# Patient Record
Sex: MALE | Employment: FULL TIME | URBAN - METROPOLITAN AREA
[De-identification: names, ages, dates, MRNs, and addresses within clinical notes are randomized per-mention and may not be internally consistent; named-entity substitution may affect disease eponyms.]

---

## 2017-02-28 ENCOUNTER — TRANSFERRED RECORDS (OUTPATIENT)
Dept: HEALTH INFORMATION MANAGEMENT | Facility: CLINIC | Age: 42
End: 2017-02-28

## 2017-05-02 ENCOUNTER — PRE VISIT (OUTPATIENT)
Dept: ORTHOPEDICS | Facility: CLINIC | Age: 42
End: 2017-05-02

## 2017-05-02 NOTE — TELEPHONE ENCOUNTER
1.  Date/reason for appt: 5/4/17 8:30AM - Rt Humerus Lesion  2.  Referring provider: Dr. Georgi Hammond  3.  Call to patient (Yes / No - short description): no, pt is referred  4.  Previous care at / records requested from: [facility] -- Faxed request for records/imaging

## 2017-05-04 ENCOUNTER — OFFICE VISIT (OUTPATIENT)
Dept: ORTHOPEDICS | Facility: CLINIC | Age: 42
End: 2017-05-04

## 2017-05-04 VITALS — WEIGHT: 173.4 LBS | HEIGHT: 72 IN | BODY MASS INDEX: 23.49 KG/M2

## 2017-05-04 DIAGNOSIS — M89.9 BONE LESION: ICD-10-CM

## 2017-05-04 DIAGNOSIS — M89.9 BONE LESION: Primary | ICD-10-CM

## 2017-05-04 PROBLEM — J30.9 ATOPIC RHINITIS: Status: ACTIVE | Noted: 2017-05-04

## 2017-05-04 LAB
ALP SERPL-CCNC: 63 U/L (ref 40–150)
BUN SERPL-MCNC: 16 MG/DL (ref 7–30)
CALCIUM SERPL-MCNC: 9.5 MG/DL (ref 8.5–10.1)
CREAT SERPL-MCNC: 0.96 MG/DL (ref 0.66–1.25)
ERYTHROCYTE [DISTWIDTH] IN BLOOD BY AUTOMATED COUNT: 13.3 % (ref 10–15)
ERYTHROCYTE [SEDIMENTATION RATE] IN BLOOD BY WESTERGREN METHOD: 7 MM/H (ref 0–15)
GFR SERPL CREATININE-BSD FRML MDRD: 86 ML/MIN/1.7M2
HCT VFR BLD AUTO: 49.6 % (ref 40–53)
HGB BLD-MCNC: 16 G/DL (ref 13.3–17.7)
KAPPA LC UR-MCNC: 5.13 MG/DL (ref 0.33–1.94)
KAPPA LC/LAMBDA SER: 2.25 {RATIO} (ref 0.26–1.65)
LAMBDA LC SERPL-MCNC: 2.28 MG/DL (ref 0.57–2.63)
LDH SERPL L TO P-CCNC: 132 U/L (ref 85–227)
MCH RBC QN AUTO: 29.1 PG (ref 26.5–33)
MCHC RBC AUTO-ENTMCNC: 32.3 G/DL (ref 31.5–36.5)
MCV RBC AUTO: 90 FL (ref 78–100)
PLATELET # BLD AUTO: 244 10E9/L (ref 150–450)
PSA SERPL-ACNC: 0.86 UG/L (ref 0–4)
RBC # BLD AUTO: 5.5 10E12/L (ref 4.4–5.9)
WBC # BLD AUTO: 4.8 10E9/L (ref 4–11)

## 2017-05-04 ASSESSMENT — ENCOUNTER SYMPTOMS
EYE IRRITATION: 0
RECTAL PAIN: 0
JAUNDICE: 0
VOMITING: 0
LIGHT-HEADEDNESS: 1
ARTHRALGIAS: 1
TACHYCARDIA: 0
ORTHOPNEA: 0
BLOATING: 0
FATIGUE: 1
HYPOTENSION: 0
INCREASED ENERGY: 1
ABDOMINAL PAIN: 0
NAUSEA: 0
LEG PAIN: 0
SKIN CHANGES: 0
WEIGHT GAIN: 0
POLYDIPSIA: 0
POLYPHAGIA: 0
MYALGIAS: 1
ALTERED TEMPERATURE REGULATION: 0
CLAUDICATION: 0
SYNCOPE: 0
PALPITATIONS: 1
POOR WOUND HEALING: 0
HALLUCINATIONS: 0
RECTAL BLEEDING: 0
EXERCISE INTOLERANCE: 0
MUSCLE CRAMPS: 0
BOWEL INCONTINENCE: 0
BACK PAIN: 0
EYE WATERING: 0
HEARTBURN: 1
BLOOD IN STOOL: 0
EYE REDNESS: 0
SLEEP DISTURBANCES DUE TO BREATHING: 0
NAIL CHANGES: 1
STIFFNESS: 1
CHILLS: 0
CONSTIPATION: 0
MUSCLE WEAKNESS: 1
JOINT SWELLING: 0
EYE PAIN: 0
FEVER: 0
WEIGHT LOSS: 0
LEG SWELLING: 0
DOUBLE VISION: 0
DECREASED APPETITE: 0
DIARRHEA: 1
NIGHT SWEATS: 0
HYPERTENSION: 0
NECK PAIN: 1

## 2017-05-04 NOTE — PROGRESS NOTES
The results were reviewed.  Please notify the patient of any abnormal results.      John Hudson MD  5/4/2017  6:12 PM

## 2017-05-04 NOTE — TELEPHONE ENCOUNTER
Spoke to medical records, they will have the records faxed to us within the hour. The request was still processing.

## 2017-05-04 NOTE — PROGRESS NOTES
"    [facility], Orthopaedic Surgery Consultation  by John Hudson M.D.    Vince Krueger MRN# [id number]   Age: 41 year old YOB: 1975     Requesting physician: Georgi Hammond MD [facility]  Dr. Marlon Pulliam, [facility]  Dr. Radames Alvares [facility]  Tanya Wen NP primary care, [place], [place], [facility]            Assessment and Plan:   Assessment:  1. Osteolytic lesion R humeral diaphysis.  Likely neoplastic in origin. R/o lymphoma, myeloma, MetCA or other aggressive benign neoplasms.  2. High risk of path fracture (Mirel's score 10 of 12)  3. Humeral fracture brace orthosis and sling while at work.    Plan:  1. Metastatic screening labs  2. SPEP  3. Whole body bone scan  4. CT of chest abd pelvis  5. MRI of humerus  6. Ultimately may require open biopsy, intralesional curettage depending upon dx, and prophylactic internal fixation.  7. RTC in 1 week to review above testing results          History of Present Illness:   41 year old male  chief complaint    R hand dominant male.   Otherwise healthy.  Initially saw Kimber Alvares and Kang who Rx'd PT and brace and injection for clinical dx of \"tennis elbow\".  Eventually saw Chiropracter Steven Collins who obtained XR and lesion noted on XR R humerus.  No personal hx of CA.  No night sweats or chills.    Current symptoms:  Problem: Progressive Pain and weakness in R arm, trouble turning arm, twisting problematic, can lift and flex elbow.  Cannot turn steering wheel easily.  Onset and duration: x 1.5 years  Awakens from sleep due to sx's:  No  Precipitating Injury:  No    Other joints or sites painful:  No  Fever: No  Appetite change or weight loss: No           Physical Exam:     EXAMINATION pertinent findings:   VITAL SIGNS: Height 1.829 m (6'), weight 78.7 kg (173 lb 6.4 oz).  Body mass index is 23.52 kg/(m^2).  RESP: non labored breathing   ABD: benign . No organomegaly.  SKIN: grossly " normal   LYMPHATIC: grossly normal . Supraclavicular or cervical or axillary adenopathy.  NEURO: grossly normal . Marked weakness R shoulder. Strength not tested due to fx risk.  VASCULAR: satisfactory perfusion of all extremities   MUSCULOSKELETAL:   Tender R humerus.  Full AROM R shoulder            Data:   All laboratory data reviewed  All imaging studies reviewed by me         MD Rai Eduardo Family Professor  Oncology and Adult Reconstructive Surgery  Dept Orthopaedic Surgery, [facility]  [phone] office, [phone] pager  [email or web address]            DATA for DOCUMENTATION:         Past Medical History:     Patient Active Problem List   Diagnosis     Gastroesophageal reflux disease     Atopic rhinitis     Contact dermatitis     Mass of neck     History of fundoplication     No past medical history on file.    Also see scanned health assessment forms.       Past Surgical History:   No past surgical history on file.         Social History:     Social History     Social History     Marital status:      Spouse name: N/A     Number of children: N/A     Years of education: N/A     Occupational History     Not on file.     Social History Main Topics     Smoking status: Not on file     Smokeless tobacco: Not on file     Alcohol use Not on file     Drug use: Not on file     Sexual activity: Not on file     Other Topics Concern     Not on file     Social History Narrative            Family History:     No family history on file.         Medications:     No current outpatient prescriptions on file.     No current facility-administered medications for this visit.               Review of Systems:   A comprehensive 10 point review of systems (constitutional, ENT, cardiac, peripheral vascular, lymphatic, respiratory, GI, , Musculoskeletal, skin, Neurological) was performed and found to be negative except as described in this note.     See intake form completed by patient      Answers for  HPI/ROS submitted by the patient on 5/4/2017   General Symptoms: Yes  Skin Symptoms: Yes  HENT Symptoms: No  EYE SYMPTOMS: Yes  HEART SYMPTOMS: Yes  LUNG SYMPTOMS: No  INTESTINAL SYMPTOMS: Yes  URINARY SYMPTOMS: No  REPRODUCTIVE SYMPTOMS: No  SKELETAL SYMPTOMS: Yes  BLOOD SYMPTOMS: No  NERVOUS SYSTEM SYMPTOMS: No  MENTAL HEALTH SYMPTOMS: No  Fever: No  Loss of appetite: No  Weight loss: No  Weight gain: No  Fatigue: Yes  Night sweats: No  Chills: No  Increased stress: No  Excessive hunger: No  Excessive thirst: No  Feeling hot or cold when others believe the temperature is normal: No  Loss of height: No  Post-operative complications: No  Surgical site pain: No  Hallucinations: No  Change in or Loss of Energy: Yes  Hyperactivity: No  Confusion: No  Changes in hair: No  Changes in moles/birth marks: No  Itching: No  Rashes: Yes  Changes in nails: Yes  Acne: Yes  Change in facial hair: No  Warts: No  Non-healing sores: No  Scarring: No  Flaking of skin: No  Color changes of hands/feet in cold : No  Sun sensitivity: No  Skin thickening: No  Eye pain: No  Vision loss: No  Dry eyes: No  Watery eyes: No  Eye bulging: No  Double vision: No  Flashing of lights: No  Spots: No  Floaters: No  Redness: No  Crossed eyes: No  Tunnel Vision: No  Yellowing of eyes: No  Eye irritation: No  Chest pain or pressure: Yes  Fast or irregular heartbeat: Yes  Pain in legs with walking: No  Swelling in feet or ankles: No  Trouble breathing while lying down: No  Fingers or Toes appear blue: No  High blood pressure: No  Low blood pressure: No  Fainting: No  Murmurs: No  Chest pain on exertion: No  Chest pain at rest: No  Cramping pain in leg during exercise: No  Pacemaker: No  Varicose veins: No  Edema or swelling: No  Fast heart beat: No  Wake up at night with shortness of breath: No  Heart flutters: Yes  Light-headedness: Yes  Exercise intolerance: No  Heart burn or indigestion: Yes  Nausea: No  Vomiting: No  Abdominal pain: No  Bloating:  No  Constipation: No  Diarrhea: Yes  Blood in stool: No  Black stools: No  Rectal or Anal pain: No  Fecal incontinence: No  Rectal bleeding: No  Yellowing of skin or eyes: No  Vomit with blood: No  Change in stools: Yes  Hemorrhoids: No  Back pain: No  Muscle aches: Yes  Neck pain: Yes  Swollen joints: No  Joint pain: Yes  Bone pain: Yes  Muscle cramps: No  Muscle weakness: Yes  Joint stiffness: Yes  Bone fracture: No

## 2017-05-04 NOTE — NURSING NOTE
Reason For Visit:   Chief Complaint   Patient presents with     Consult     Right humerus lesion. Referred by Georgi Hammond at [facility]            Ht 1.829 m (6')  Wt 78.7 kg (173 lb 6.4 oz)  BMI 23.52 kg/m2                 Pain Assessment  Patient Currently in Pain: Yes  0-10 Pain Scale: 2  Primary Pain Location: Arm  Pain Orientation: Right  Pain Descriptors: Aching, Dull, Sharp, Stabbing, Constant  Alleviating Factors: Rest  Aggravating Factors: Movement              No current outpatient prescriptions on file.     No current facility-administered medications for this visit.        Allergies   Allergen Reactions     Penicillins Terrance Galvan LPN

## 2017-05-04 NOTE — TELEPHONE ENCOUNTER
LVM for medical records at [facility] to get update on records.     Faxed another STAT request for records.

## 2017-05-04 NOTE — MR AVS SNAPSHOT
After Visit Summary   5/4/2017    Vince Krueger    MRN: [id number]           Patient Information     Date Of Birth          1975        Visit Information        Provider Department      5/4/2017 8:30 AM John Hudson MD [facility]        Today's Diagnoses     Bone lesion    -  1       Follow-ups after your visit        Your next 10 appointments already scheduled     May 04, 2017 10:00 AM CDT   LAB with  LAB   [facility] ([facility])    [address] [place] [id number] [phone]           Patient must bring picture ID.  Patient should be prepared to give a urine specimen  Please do not eat 10-12 hours before your appointment if you are coming in fasting for labs on lipids, cholesterol, or glucose (sugar).  Pregnant women should follow their Care Team instructions. Water with medications is okay. Do not drink coffee or other fluids.   If you have concerns about taking  your medications, please ask at office or if scheduling via TabbedOut, send a message by clicking on Secure Messaging, Message Your Care Team.            May 04, 2017 12:00 PM CDT   (Arrive by 11:45 AM)   CT CHEST/ABDOMEN/PELVIS W CONTRAST with UCCT1   [facility] CT ([facility])    [address] [place] [id number] [phone]           Please bring any scans or X-rays taken at other hospitals, if similar tests were done. Also bring a list of your medicines, including vitamins, minerals and over-the-counter drugs. It is safest to leave personal items at home.  Be sure to tell your doctor:   If you have any allergies.   If there s any chance you are pregnant.   If you are breastfeeding.   If you have any special needs.  You may have contrast for this exam. To prepare:   Do not eat or drink for 2 hours before your exam. If you need to take medicine, you may take it with small sips of water. (We  may ask you to take liquid medicine as well.)   The day before your exam, drink extra fluids at least six 8-ounce glasses (unless your doctor tells you to restrict your fluids).  Patients over 70 or patients with diabetes or kidney problems:   If you haven t had a blood test (creatinine test) within the last 30 days, go to your clinic or Diagnostic Imaging Department for this test.  If you have diabetes:   If your kidney function is normal, continue taking your metformin (Avandamet, Glucophage, Glucovance, Metaglip) on the day of your exam.   If your kidney function is abnormal, wait 48 hours before restarting this medicine.  You will have oral contrast for this exam:   You will drink the contrast at home. Get this from your clinic or Diagnostic Imaging Department. Please follow the directions given.  Please wear loose clothing, such as a sweat suit or jogging clothes. Avoid snaps, zippers and other metal. We may ask you to undress and put on a hospital gown.  If you have any questions, please call the Imaging Department where you will have your exam.            May 09, 2017  8:30 AM CDT   NM INJECTION with UUNMINJ1   [facility], Nuclear Medicine ([facility])    [address] [facility] [id number] [phone]            May 09, 2017  9:00 AM CDT   MR UPPER EXTREMITY NON JOINT RIGHT W/O CONTRAST with UUMR2   [facility], MRI ([facility])    [address] [facility] [id number] [phone]           Take your medicines as usual, unless your doctor tells you not to. Bring a list of your current medicines to your exam (including vitamins, minerals and over-the-counter drugs). Also bring the results of similar scans you may have had.  Please remove any body piercings and hair extensions before you arrive.  Follow your doctor s orders. If you do not, we may have to postpone your exam.  You  will not have contrast for this exam. You do not need to do anything special to prepare.  The MRI machine uses a strong magnet. Please wear clothes without metal (snaps, zippers). A sweatsuit works well, or we may give you a hospital gown.   **IMPORTANT** THE INSTRUCTIONS BELOW ARE ONLY FOR THOSE PATIENTS WHO HAVE BEEN TOLD THEY WILL RECEIVE SEDATION OR GENERAL ANESTHESIA DURING THEIR MRI PROCEDURE:  IF YOU WILL RECEIVE SEDATION (take medicine to help you relax during your exam):   You must get the medicine from your doctor before you arrive. Bring the medicine to the exam. Do not take it at home.   Arrive one hour early. Bring someone who can take you home after the test. Your medicine will make you sleepy. After the exam, you may not drive, take a bus or take a taxi by yourself.   No eating 8 hours before your exam. You may have clear liquids up until 4 hours before your exam. (Clear liquids include water, clear tea, black coffee and fruit juice without pulp.)  IF YOU WILL RECEIVE ANESTHESIA (be asleep for your exam):   Arrive 1 1/2 hours early. Bring someone who can take you home after the test. You may not drive, take a bus or take a taxi by yourself.   No eating 8 hours before your exam. You may have clear liquids up until 4 hours before your exam. (Clear liquids include water, clear tea, black coffee and fruit juice without pulp.)   You will spend four to five hours in the recovery room.  Please call the Imaging Department at your exam site with any questions.            May 09, 2017 11:30 AM CDT   NM BONE SCAN WHOLE BODY with UUNM3   [facility] [place], Nuclear Medicine ([facility])    [address] [facility] [id number] [phone]           Please bring a list of your medicines to the exam. (Include vitamins, minerals and over-the-counter drugs.) You should wear comfortable clothes. Leave your valuables at home. Please bring related prior results and  films. Tell your doctor:   If you are breastfeeding or may be pregnant.   If you have had a barium test within the past few days. Barium may change the results of certain exams.   If you think you may need sedation (medicine to help you relax).  You may eat and drink as normal.  Drink plenty of fluids and empty bladder frequently between injection and scans.            May 11, 2017 11:15 AM CDT   (Arrive by 11:00 AM)   RETURN TUMOR VISIT with John Hudson MD   [facility] ([facility])    [address] [place] [address]  [address] [facility] [id number] [phone]              Future tests that were ordered for you today     Open Future Orders        Priority Expected Expires Ordered    CT Chest/Abdomen/Pelvis w Contrast Routine  5/4/2018 5/4/2017    NM Bone whole body Routine  5/4/2018 5/4/2017    MRI Upper extremity non joint without contast RT* Routine  5/4/2018 5/4/2017    Alkaline phosphatase Routine  6/3/2017 5/4/2017    Calcium Routine  6/3/2017 5/4/2017    Creatinine Routine  6/3/2017 5/4/2017    Lactate Dehydrogenase Routine  6/3/2017 5/4/2017    Protein electrophoresis Routine  6/3/2017 5/4/2017    Prostate spec antigen screen Routine  6/3/2017 5/4/2017    Urea nitrogen Routine  6/3/2017 5/4/2017    CBC with platelets Routine  6/3/2017 5/4/2017    Erythrocyte sedimentation rate auto Routine  6/3/2017 5/4/2017    [place] and lambda light chain Routine  6/3/2017 5/4/2017            Who to contact     Please call your clinic at [phone] to:    Ask questions about your health    Make or cancel appointments    Discuss your medicines    Learn about your test results    Speak to your doctor   If you have compliments or concerns about an experience at your clinic, or if you wish to file a complaint, please contact [facility] Patient Relations at [phone] or email us at [email or web address]         Additional Information About Your  Visit        [employer] Information     [employer] is an electronic gateway that provides easy, online access to your medical records. With [employer], you can request a clinic appointment, read your test results, renew a prescription or communicate with your care team.     To sign up for [employer] visit the website at [email or web address]   You will be asked to enter the access code listed below, as well as some personal information. Please follow the directions to create your username and password.     Your access code is: [email or web address]  Expires: 2017  6:30 AM     Your access code will  in 90 days. If you need help or a new code, please contact your [facility] or call [phone] for assistance.        Care EveryWhere ID     This is your Care EveryWhere ID. This could be used by other organizations to access your [place] medical records  [id number]        Your Vitals Were     Height BMI (Body Mass Index)                1.829 m (6') 23.52 kg/m2           Blood Pressure from Last 3 Encounters:   No data found for BP    Weight from Last 3 Encounters:   17 78.7 kg (173 lb 6.4 oz)               Primary Care Provider Office Phone # Fax #    Tanya Wen [phone]       [facility] [address] [place] [address]        Thank you!     Thank you for choosing [facility]  for your care. Our goal is always to provide you with excellent care. Hearing back from our patients is one way we can continue to improve our services. Please take a few minutes to complete the written survey that you may receive in the mail after your visit with us. Thank you!             Your Updated Medication List - Protect others around you: Learn how to safely use, store and throw away your medicines at www.disposemymeds.org.      Notice  As of 2017  9:56 AM    You have not been prescribed any medications.

## 2017-05-05 LAB
ALBUMIN SERPL ELPH-MCNC: 4.7 G/DL (ref 3.7–5.1)
ALPHA1 GLOB SERPL ELPH-MCNC: 0.3 G/DL (ref 0.2–0.4)
ALPHA2 GLOB SERPL ELPH-MCNC: 0.6 G/DL (ref 0.5–0.9)
B-GLOBULIN SERPL ELPH-MCNC: 0.8 G/DL (ref 0.6–1)
GAMMA GLOB SERPL ELPH-MCNC: 2.4 G/DL (ref 0.7–1.6)
M PROTEIN SERPL ELPH-MCNC: 1.6 G/DL
PROT PATTERN SERPL ELPH-IMP: ABNORMAL

## 2017-05-09 ENCOUNTER — HOSPITAL ENCOUNTER (OUTPATIENT)
Dept: MRI IMAGING | Facility: CLINIC | Age: 42
Discharge: HOME | End: 2017-05-09
Attending: ORTHOPAEDIC SURGERY | Admitting: ORTHOPAEDIC SURGERY
Payer: COMMERCIAL

## 2017-05-09 ENCOUNTER — HOSPITAL ENCOUNTER (OUTPATIENT)
Dept: NUCLEAR MEDICINE | Facility: CLINIC | Age: 42
Setting detail: NUCLEAR MEDICINE
End: 2017-05-09
Attending: ORTHOPAEDIC SURGERY
Payer: COMMERCIAL

## 2017-05-09 DIAGNOSIS — M89.9 BONE LESION: ICD-10-CM

## 2017-05-09 PROCEDURE — 34300033 ZZH RX 343: Performed by: ORTHOPAEDIC SURGERY

## 2017-05-09 PROCEDURE — 78306 BONE IMAGING WHOLE BODY: CPT

## 2017-05-09 PROCEDURE — A9503 TC99M MEDRONATE: HCPCS | Performed by: ORTHOPAEDIC SURGERY

## 2017-05-09 RX ORDER — GADOBUTROL 604.72 MG/ML
7.5 INJECTION INTRAVENOUS ONCE
Status: COMPLETED | OUTPATIENT
Start: 2017-05-09 | End: 2017-05-09

## 2017-05-09 RX ORDER — TC 99M MEDRONATE 20 MG/10ML
20-30 INJECTION, POWDER, LYOPHILIZED, FOR SOLUTION INTRAVENOUS ONCE
Status: COMPLETED | OUTPATIENT
Start: 2017-05-09 | End: 2017-05-09

## 2017-05-09 RX ADMIN — GADOBUTROL 7.5 ML: 604.72 INJECTION INTRAVENOUS at 10:24

## 2017-05-09 RX ADMIN — TC 99M MEDRONATE 22.4 MCI.: 20 INJECTION, POWDER, LYOPHILIZED, FOR SOLUTION INTRAVENOUS at 08:45

## 2017-05-11 ENCOUNTER — OFFICE VISIT (OUTPATIENT)
Dept: SURGERY | Facility: CLINIC | Age: 42
End: 2017-05-11

## 2017-05-11 ENCOUNTER — ANESTHESIA EVENT (OUTPATIENT)
Dept: SURGERY | Facility: CLINIC | Age: 42
DRG: 825 | End: 2017-05-11
Payer: COMMERCIAL

## 2017-05-11 ENCOUNTER — ALLIED HEALTH/NURSE VISIT (OUTPATIENT)
Dept: SURGERY | Facility: CLINIC | Age: 42
End: 2017-05-11

## 2017-05-11 ENCOUNTER — OFFICE VISIT (OUTPATIENT)
Dept: ORTHOPEDICS | Facility: CLINIC | Age: 42
End: 2017-05-11

## 2017-05-11 VITALS — WEIGHT: 174 LBS | HEIGHT: 72 IN | BODY MASS INDEX: 23.57 KG/M2

## 2017-05-11 VITALS
DIASTOLIC BLOOD PRESSURE: 83 MMHG | WEIGHT: 174 LBS | HEIGHT: 72 IN | HEART RATE: 59 BPM | BODY MASS INDEX: 23.57 KG/M2 | TEMPERATURE: 98.6 F | OXYGEN SATURATION: 98 % | RESPIRATION RATE: 16 BRPM | SYSTOLIC BLOOD PRESSURE: 126 MMHG

## 2017-05-11 DIAGNOSIS — Z01.818 PRE-OP EXAMINATION: Primary | ICD-10-CM

## 2017-05-11 DIAGNOSIS — M89.9 LESION OF RIGHT HUMERUS: Primary | ICD-10-CM

## 2017-05-11 DIAGNOSIS — M89.9 LESION OF RIGHT HUMERUS: ICD-10-CM

## 2017-05-11 DIAGNOSIS — C90.00 MULTIPLE MYELOMA NOT HAVING ACHIEVED REMISSION (H): ICD-10-CM

## 2017-05-11 LAB
ALBUMIN SERPL-MCNC: 3.9 G/DL (ref 3.4–5)
ALP SERPL-CCNC: 56 U/L (ref 40–150)
ALT SERPL W P-5'-P-CCNC: 21 U/L (ref 0–70)
ANION GAP SERPL CALCULATED.3IONS-SCNC: 6 MMOL/L (ref 3–14)
AST SERPL W P-5'-P-CCNC: 13 U/L (ref 0–45)
BILIRUB SERPL-MCNC: 0.5 MG/DL (ref 0.2–1.3)
BUN SERPL-MCNC: 12 MG/DL (ref 7–30)
CALCIUM SERPL-MCNC: 9.1 MG/DL (ref 8.5–10.1)
CHLORIDE SERPL-SCNC: 103 MMOL/L (ref 94–109)
CO2 SERPL-SCNC: 29 MMOL/L (ref 20–32)
CREAT SERPL-MCNC: 0.88 MG/DL (ref 0.66–1.25)
GFR SERPL CREATININE-BSD FRML MDRD: ABNORMAL ML/MIN/1.7M2
GLUCOSE SERPL-MCNC: 130 MG/DL (ref 70–99)
POTASSIUM SERPL-SCNC: 4.5 MMOL/L (ref 3.4–5.3)
PROT SERPL-MCNC: 8.5 G/DL (ref 6.8–8.8)
SODIUM SERPL-SCNC: 138 MMOL/L (ref 133–144)

## 2017-05-11 RX ORDER — BIOTIN 10 MG
TABLET ORAL DAILY
COMMUNITY

## 2017-05-11 RX ORDER — LORATADINE 10 MG/1
10 TABLET ORAL PRN
COMMUNITY

## 2017-05-11 ASSESSMENT — LIFESTYLE VARIABLES: TOBACCO_USE: 1

## 2017-05-11 NOTE — LETTER
5/11/2017       RE: Vince Krueger  [address] [place] [facility] [place] [address]     Dear Colleague,    Thank you for referring your patient, Vince Krueger, to the [facility] at [facility]. Please see a copy of my visit note below.      Requesting physician: Georgi Hammond MD [facility]  Dr. Marlon Pulliam, [facility]  Dr. Radames Alvares [facility]  Tanya Wen NP primary care, Kanu Collins [place], [facility]     Diagnosis:  1. Osteolytic lesion of Right humeral diaphysis.  Likely neoplastic in origin. R/o lymphoma, myeloma, MetCA or other aggressive benign neoplasms.  2. High risk of path fracture (Mirel's score 10 of 12)    Interval History:  41 year-old male returns to clinic today with the above. He has completed his laboratory testing, MRI, and bone scan as detailed previously. All of these results have been reviewed by Dr. Hudson.     Patient reports no change in symptoms during this interval. No trauma. He has been working with his sling and is tolerating this well. He has been avoiding use of his right arm as much as possible.     Labs:   Recent Labs   Lab Test  05/04/17   1054   HGB  16.0   SED  7   WBC  4.8     Protein Electropheresis on 5/4/2017:  Component Value Flag Ref Range Units Status Collected Lab   Albumin Fraction 4.7  3.7 - 5.1 g/dL Final 05/04/2017 10:54 AM 51   Alpha 1 Fraction 0.3  0.2 - 0.4 g/dL Final 05/04/2017 10:54 AM 51   Alpha 2 Fraction 0.6  0.5 - 0.9 g/dL Final 05/04/2017 10:54 AM 51   Beta Fraction 0.8  0.6 - 1.0 g/dL Final 05/04/2017 10:54 AM 51   Gamma Fraction 2.4 (H) 0.7 - 1.6 g/dL Final 05/04/2017 10:54 AM 51   Monoclonal Peak 1.6 (H) 0.0 g/dL Final 05/04/2017 10:54 AM 51   ELP Interpretation:     Final 05/04/2017 10:54 AM 51   Monoclonal protein (about 1.6 g/dL) seen in the gamma fraction, not previously    characterized in our laboratory. Recommend serum and urine immunofixation for     confirmation and further characterization if not previously performed    elsewhere. Pathologic significance requires clinical correlation.  ALLEN Wang M.D., Ph.D., Pathologist ().      [place] and Lambda Light chain on 5/4/2017:  Component Value Flag Ref Range Units Status Collected Lab   Kappa Free Lt Chain 5.13 (H) 0.33 - 1.94 mg/dL Final 05/04/2017 10:54 AM 51   Lambda Free Lt Chain 2.28  0.57 - 2.63 mg/dL Final 05/04/2017 10:54 AM 51   Kappa Lambda Ratio 2.25 (H) 0.26 - 1.65  Final 05/04/2017 10:54 AM 51     Imaging studies reviewed by Dr. Hudson.       Impression:  1. Osteolytic lesion of Right humeral diaphysis.  2. Multiple myeloma diagnosed by laboratory testing.     Plan:  1. Referral to Dr. Andre Panda in Oncology for evaluation and management. Appointment is set for 5/17/2017 at 5:00pm.  2. Will obtain CMP, Type and Screen, and Beta 2 microglobulin laboratory testing today.  3. Will defer decision to obtain bone marrow biopsy or skeletal survey to Dr. Panda.  4. Continue sling and humeral brace with NWB to RUE until surgery.  5. Plan to proceed with right humerus open biopsy, intralesional curettage, open reduction internal fixation, and supplemental fixation with Zometa impregnated cement when scheduling allows. Risks, benefits, and alternatives discussed. Specific risk of radial nerve injury discussed in detail.   6. Will obtain pre-op history and physical from PAC today.   7. Plan to see next on date of surgery.       John Hudson MD  [facility] [[email or web address]] Family Professor  Oncology and Adult Reconstructive Surgery  Dept Orthopaedic Surgery, [facility]  [phone] office, [phone] pager  [email or web address]    Total Time = 15 min, 50% of which was spent in counseling and coordination of care as documented above.

## 2017-05-11 NOTE — MR AVS SNAPSHOT
After Visit Summary   2017    Vince Krueger    MRN: [id number]           Patient Information     Date Of Birth          1975        Visit Information        Provider Department      2017 2:30 PM Rn, [facility]        Care Instructions    Preparing for Your Surgery      Name:  Vince Krueger   MRN:  [id number]   :  1975   Today's Date:  2017     Arriving for surgery:  Surgery date:  17  Surgery time:  11:30am  Arrival time:  9:00am  Please come to:     [facility] [address]  [address] [place], [place] [address]. [place]  [address]    - parking is available in front of [facility] from 5:15AM to 8:00PM. If you prefer, park your car in the Green Lot.    -Proceed to the 3rd floor, check in at the Adult Surgery Waiting Lounge. [phone]    If an escort is needed stop at the Information Desk in the lobby. Inform the information person that you are here for surgery. An escort to the Adult Surgery Waiting Lounge will be provided.    What can I eat or drink?  -  You may have solid food or milk products until 8 hours prior to your surgery. Do not eat after 11:30 pm  -  You may have water, apple juice or 7up/Sprite until 2 hours prior to your surgery. Do not drink anything after 9:00 am    Which medicines can I take? No Aspirin, vitamins, or supplements for 7 days before surgery. No Naproxen/Aleve for 2 days before surgery. No Ibuprofen for 24 hours before surgery.    -  Please take these medications the day of surgery:  Prilosec, Claritin, Tylenol if needed    How do I prepare myself?  -  Take two showers: one the night before surgery; and one the morning of surgery.         Use Scrubcare or Hibiclens to wash from neck down for each shower.  You may use your own shampoo and conditioner.   -  Do NOT use lotion, powder, deodorant, or hair gel/spray the day of your surgery.  -  Do NOT wear any jewelry.  -Do not  bring your own medications to the hospital.  -  Bring your ID and insurance card.    Questions or Concerns:  If you have questions or concerns, please call the  Preoperative Assessment Center, Monday-Friday 7AM-7PM:  [phone]    AFTER YOUR SURGERY  Breathing exercises   Breathing exercises help you recover faster. Take deep breaths and let the air out slowly. This will:     Help you wake up after surgery.    Help prevent complications like pneumonia.  Preventing complications will help you go home sooner.   We may give you a breathing device (incentive spirometer) to encourage you to breathe deeply.   Nausea and vomiting   You may feel sick to your stomach after surgery; if so, let your nurse know.    Pain control:  After surgery, you may have pain. Our goal is to help you manage your pain. Pain medicine will help you feel comfortable enough to do activities that will help you heal.  These activities may include breathing exercises, walking and physical therapy.   To help your health care team treat your pain we will ask: 1) If you have pain  2) where it is located 3) describe your pain in your words  Methods of pain control include medications given by mouth, vein or by nerve block for some surgeries.  We may give you a pain control pump that will:  1) Deliver the medicine through a tube placed in your vein  2) Control the amount of medicine you receive  3) Allow you to push a button to deliver a dose of pain medicine  Sequential Compression Device (SCD) or Pneumo Boots:  You may need to wear SCD S on your legs or feet. These are wraps connected to a machine that pumps in air and releases it. The repeated pumping helps prevent blood clots from forming.                 Follow-ups after your visit        Your next 10 appointments already scheduled     May 11, 2017  2:30 PM CDT   (Arrive by 2:15 PM)   PAC RN ASSESSMENT with  Pac Rn   [facility] Preoperative Assessment Center ([email or web address])     [address]  4th Floor  [facility] [id number] [phone]            May 11, 2017  3:10 PM CDT   (Arrive by 2:55 PM)   PAC Anesthesia Consult with  Pac Anesthesiologist   [facility] Preoperative Assessment [facility] ([facility])    [address]  4th Floor  [facility] [id number] [phone]            May 17, 2017  5:00 PM CDT   NEW ONC with Andre Panda MD   [facility] Blood and Marrow Transplant ([facility])    [address]  2nd Floor  [facility] [id number] [phone]            May 19, 2017   Procedure with John Hudson MD   [facility], [place], Same Day Surgery (--)    [address] [id number] [phone]              Future tests that were ordered for you today     Open Future Orders        Priority Expected Expires Ordered    ABO/Rh type and screen Routine  7/10/2017 5/11/2017    Beta 2 microglobulin Routine  5/12/2018 5/11/2017    Comp. Metabolic Panel (14) ([facility]) Routine  7/11/2017 5/11/2017            Who to contact     Please call your clinic at [phone] to:    Ask questions about your health    Make or cancel appointments    Discuss your medicines    Learn about your test results    Speak to your doctor   If you have compliments or concerns about an experience at your clinic, or if you wish to file a complaint, please contact [facility] Patient Relations at [phone] or email us at [email or web address]         Additional Information About Your Visit        [employer] Information     [employer] is an electronic gateway that provides easy, online access to your medical records. With [employer], you can request a clinic appointment, read your test results, renew a prescription or communicate with your care team.     To sign up for [employer] visit the website at [email or web address]   You will be asked to enter the access code listed below, as well as  some personal information. Please follow the directions to create your username and password.     Your access code is: [email or web address]  Expires: 2017  6:30 AM     Your access code will  in 90 days. If you need help or a new code, please contact your [facility] or call [phone] for assistance.        Care EveryWhere ID     This is your Care EveryWhere ID. This could be used by other organizations to access your [place] medical records  [id number]         Blood Pressure from Last 3 Encounters:   17 126/83    Weight from Last 3 Encounters:   17 78.9 kg (174 lb)   17 78.9 kg (174 lb)   17 78.7 kg (173 lb 6.4 oz)              Today, you had the following     No orders found for display       Primary Care Provider Office Phone # Fax #    Tanya MIGUEL ÁNGEL Wen [phone]       [facility] [address] [place] [address]        Thank you!     Thank you for choosing [facility] PREOPERATIVE ASSESSMENT [place]  for your care. Our goal is always to provide you with excellent care. Hearing back from our patients is one way we can continue to improve our services. Please take a few minutes to complete the written survey that you may receive in the mail after your visit with us. Thank you!             Your Updated Medication List - Protect others around you: Learn how to safely use, store and throw away your medicines at www.disposemymeds.org.          This list is accurate as of: 17  2:05 PM.  Always use your most recent med list.                   Brand Name Dispense Instructions for use    loratadine 10 MG tablet    CLARITIN     Take 10 mg by mouth as needed       MULTIVITAMIN ADULT Chew      Take by mouth daily

## 2017-05-11 NOTE — PATIENT INSTRUCTIONS
Preparing for Your Surgery      Name:  Vince Krueger   MRN:  [id number]   :  1975   Today's Date:  2017     Arriving for surgery:  Surgery date:  17  Surgery time:  11:30am  Arrival time:  9:00am  Please come to:     [facility] [address]  [address] [place]  [address]    - parking is available in front of [facility] from 5:15AM to 8:00PM. If you prefer, park your car in the Green Lot.    -Proceed to the 3rd floor, check in at the Adult Surgery Waiting Lounge. [phone]    If an escort is needed stop at the Information Desk in the lobby. Inform the information person that you are here for surgery. An escort to the Adult Surgery Waiting Lounge will be provided.    What can I eat or drink?  -  You may have solid food or milk products until 8 hours prior to your surgery. Do not eat after 11:30 pm  -  You may have water, apple juice or 7up/Sprite until 2 hours prior to your surgery. Do not drink anything after 9:00 am    Which medicines can I take? No Aspirin, vitamins, or supplements for 7 days before surgery. No Naproxen/Aleve for 2 days before surgery. No Ibuprofen for 24 hours before surgery.    -  Please take these medications the day of surgery:  Prilosec, Claritin, Tylenol if needed    How do I prepare myself?  -  Take two showers: one the night before surgery; and one the morning of surgery.         Use Scrubcare or Hibiclens to wash from neck down for each shower.  You may use your own shampoo and conditioner.   -  Do NOT use lotion, powder, deodorant, or hair gel/spray the day of your surgery.  -  Do NOT wear any jewelry.  -Do not bring your own medications to the hospital.  -  Bring your ID and insurance card.    Questions or Concerns:  If you have questions or concerns, please call the  Preoperative Assessment Center, Monday-Friday 7AM-7PM:  [phone]    AFTER YOUR SURGERY  Breathing exercises   Breathing exercises help you  recover faster. Take deep breaths and let the air out slowly. This will:     Help you wake up after surgery.    Help prevent complications like pneumonia.  Preventing complications will help you go home sooner.   We may give you a breathing device (incentive spirometer) to encourage you to breathe deeply.   Nausea and vomiting   You may feel sick to your stomach after surgery; if so, let your nurse know.    Pain control:  After surgery, you may have pain. Our goal is to help you manage your pain. Pain medicine will help you feel comfortable enough to do activities that will help you heal.  These activities may include breathing exercises, walking and physical therapy.   To help your health care team treat your pain we will ask: 1) If you have pain  2) where it is located 3) describe your pain in your words  Methods of pain control include medications given by mouth, vein or by nerve block for some surgeries.  We may give you a pain control pump that will:  1) Deliver the medicine through a tube placed in your vein  2) Control the amount of medicine you receive  3) Allow you to push a button to deliver a dose of pain medicine  Sequential Compression Device (SCD) or Pneumo Boots:  You may need to wear SCD S on your legs or feet. These are wraps connected to a machine that pumps in air and releases it. The repeated pumping helps prevent blood clots from forming.

## 2017-05-11 NOTE — NURSING NOTE
Reason For Visit:   Chief Complaint   Patient presents with     RECHECK     F/U Bone Scan and MRI done on 5/9/17               Ht 1.829 m (6')  Wt 78.9 kg (174 lb)  BMI 23.6 kg/m2                Current Outpatient Prescriptions   Medication     omeprazole (PRILOSEC) 20 MG CR capsule     DM-Phenylephrine-Acetaminophen (VICKS DAYQUIL COLD & FLU PO)     loratadine (CLARITIN) 10 MG tablet     No current facility-administered medications for this visit.        Allergies   Allergen Reactions     No Clinical Screening - See Comments Hives     Palms and bottom of feet itching and hives     Penicillin G Hives     Swelling and turned red     Penicillins Hives, Rash and Swelling       Cindy Byrd C.M.A.

## 2017-05-11 NOTE — MR AVS SNAPSHOT
After Visit Summary   5/11/2017    Vince Krueger    MRN: [id number]           Patient Information     Date Of Birth          1975        Visit Information        Provider Department      5/11/2017 11:15 AM John Hudson MD [facility]        Today's Diagnoses     Lesion of right humerus    -  1    Multiple myeloma not having achieved remission (H)           Follow-ups after your visit        Your next 10 appointments already scheduled     May 17, 2017  5:00 PM CDT   NEW ONC with Andre Panda MD   [facility] Blood and Marrow Transplant ([facility] and [facility])    [address]  2nd Floor  [place] [address] [phone]            May 19, 2017   Procedure with John Hudson MD   [facility], [place], Same Day Surgery (--)    [address] [facility] [address] [phone]              Who to contact     Please call your clinic at [phone] to:    Ask questions about your health    Make or cancel appointments    Discuss your medicines    Learn about your test results    Speak to your doctor   If you have compliments or concerns about an experience at your clinic, or if you wish to file a complaint, please contact [facility] Patient Relations at [phone] or email us at [email or web address]         Additional Information About Your Visit        MyChart Information     [employer] is an electronic gateway that provides easy, online access to your medical records. With [employer], you can request a clinic appointment, read your test results, renew a prescription or communicate with your care team.     To sign up for [employer] visit the website at [email or web address]   You will be asked to enter the access code listed below, as well as some personal information. Please follow the directions to create your username and password.     Your access code is: [email or web address]  Expires: 8/1/2017  6:30 AM     Your access  code will  in 90 days. If you need help or a new code, please contact your [facility] or call [phone] for assistance.        Care EveryWhere ID     This is your Care EveryWhere ID. This could be used by other organizations to access your [place] medical records  [id number]        Your Vitals Were     Height BMI (Body Mass Index)                1.829 m (6') 23.6 kg/m2           Blood Pressure from Last 3 Encounters:   17 126/83    Weight from Last 3 Encounters:   17 78.9 kg (174 lb)   17 78.9 kg (174 lb)   17 78.7 kg (173 lb 6.4 oz)              We Performed the Following     Cherelle-Operative Worksheet (Tumor/Adult Reconstruction: Knee/Hip/Fracture )     Cherelle-Operative Worksheet (Tumor/Adult Reconstruction: Knee/Hip/Fracture )        Primary Care Provider Office Phone # Fax #    Tanya Wen [phone]       [facility] [address] [place] [facility] [address]        Thank you!     Thank you for choosing [facility]  for your care. Our goal is always to provide you with excellent care. Hearing back from our patients is one way we can continue to improve our services. Please take a few minutes to complete the written survey that you may receive in the mail after your visit with us. Thank you!             Your Updated Medication List - Protect others around you: Learn how to safely use, store and throw away your medicines at www.disposemymeds.org.          This list is accurate as of: 17  4:29 PM.  Always use your most recent med list.                   Brand Name Dispense Instructions for use    loratadine 10 MG tablet    CLARITIN     Take 10 mg by mouth as needed       MULTIVITAMIN ADULT Chew      Take by mouth daily

## 2017-05-11 NOTE — PROGRESS NOTES
Requesting physician: Georgi Hammond MD [facility]  Dr. Marlon Pulliam, [facility]  Dr. Radames Alvares [facility]  Tanya Wen NP primary care, [facility], [facility]     Diagnosis:  1. Osteolytic lesion of Right humeral diaphysis.  Likely neoplastic in origin. R/o lymphoma, myeloma, MetCA or other aggressive benign neoplasms.  2. High risk of path fracture (Mirel's score 10 of 12)    Interval History:  41 year-old male returns to clinic today with the above. He has completed his laboratory testing, MRI, and bone scan as detailed previously. All of these results have been reviewed by Dr. Hudson.     Patient reports no change in symptoms during this interval. No trauma. He has been working with his sling and is tolerating this well. He has been avoiding use of his right arm as much as possible.     Labs:   Recent Labs   Lab Test  05/04/17   1054   HGB  16.0   SED  7   WBC  4.8     Protein Electropheresis on 5/4/2017:  Component Value Flag Ref Range Units Status Collected Lab   Albumin Fraction 4.7  3.7 - 5.1 g/dL Final 05/04/2017 10:54 AM 51   Alpha 1 Fraction 0.3  0.2 - 0.4 g/dL Final 05/04/2017 10:54 AM 51   Alpha 2 Fraction 0.6  0.5 - 0.9 g/dL Final 05/04/2017 10:54 AM 51   Beta Fraction 0.8  0.6 - 1.0 g/dL Final 05/04/2017 10:54 AM 51   Gamma Fraction 2.4 (H) 0.7 - 1.6 g/dL Final 05/04/2017 10:54 AM 51   Monoclonal Peak 1.6 (H) 0.0 g/dL Final 05/04/2017 10:54 AM 51   ELP Interpretation:     Final 05/04/2017 10:54 AM 51   Monoclonal protein (about 1.6 g/dL) seen in the gamma fraction, not previously    characterized in our laboratory. Recommend serum and urine immunofixation for    confirmation and further characterization if not previously performed    elsewhere. Pathologic significance requires clinical correlation.  ALLEN Wang M.D., Ph.D., Pathologist ().      [place] and Lambda Light chain on 5/4/2017:  Component Value Flag Ref Range Units  Status Collected Lab   Kappa Free Lt Chain 5.13 (H) 0.33 - 1.94 mg/dL Final 05/04/2017 10:54 AM 51   Lambda Free Lt Chain 2.28  0.57 - 2.63 mg/dL Final 05/04/2017 10:54 AM 51   Kappa Lambda Ratio 2.25 (H) 0.26 - 1.65  Final 05/04/2017 10:54 AM 51     Imaging studies reviewed by Dr. Hudson.       Impression:  1. Osteolytic lesion of Right humeral diaphysis.  2. Multiple myeloma diagnosed by laboratory testing.     Plan:  1. Referral to Dr. Andre Panda in Oncology for evaluation and management. Appointment is set for 5/17/2017 at 5:00pm.  2. Will obtain CMP, Type and Screen, and Beta 2 microglobulin laboratory testing today.  3. Will defer decision to obtain bone marrow biopsy or skeletal survey to Dr. Panda.  4. Continue sling and humeral brace with NWB to RUE until surgery.  5. Plan to proceed with right humerus open biopsy, intralesional curettage, open reduction internal fixation, and supplemental fixation with Zometa impregnated cement when scheduling allows. Risks, benefits, and alternatives discussed. Specific risk of radial nerve injury discussed in detail.   6. Will obtain pre-op history and physical from PAC today.   7. Plan to see next on date of surgery.       John Hudson MD [facility] Professor  Oncology and Adult Reconstructive Surgery  Dept Orthopaedic Surgery, [facility]  [phone] office, [phone] pager  [email or web address]    Total Time = 15 min, 50% of which was spent in counseling and coordination of care as documented above.

## 2017-05-11 NOTE — H&P
Pre-Operative H & P     CC:  Preoperative exam to assess for increased cardiopulmonary risk while undergoing surgery and anesthesia.    Date of Encounter: 5/11/2017  Primary Care Physician:  Tanya Wen  Vince Krueger is a 41 year old male who presents for pre-operative H & P in preparation for  Biopsy, Curettage and Internal Fixation Right Humerus, and Supplemental Fixation with Zometa-Impregnated Cement with Dr. Hudson on 5/19/2017 at [facility] under general anesthesia.  Mr. Krueger started to notice pain in his right elbow about 1   years ago.  He was diagnosed with tendonitis and received therapies including steroid injection and physical therapy.  When he continued did not receive relief, he went to his wives chiropractor.  The chiropractor did an x-ray and noted a lesion on right humerus. He was then referred to Dr. Hudson on 5/4/2017 for orthopedic surgery consultation for osteolytic lesion of right humeral diaphysis.  Dr. Hudson ordered an MRI which demonstrated marrow infiltrating process in the mid shaft right humerus with cortical disruption and soft tissue extension. Findings are most consistent with marrow infiltrating disease such as multiple myeloma, metastasis, and lymphoma, less likely a primary bone tumor. Dr. Hudson recommended the above surgical intervention.    Mr. Krueger presents to PAC clinic with his wife, Kemi.  His right upper extremity is in a humeral fracture brace orthosis and sling.  He has intermittent pain in right elbow which is relieved with Advil.  He would like to proceed with above surgical intervention.    PAC referral for risk assessment and optimization of anesthesia with comorbid conditions of:  hiatal hernia, s/p Tanmay fundoplication, 2014; mass on right neck, s/p excision 2011; h/o palpitations; and allergic rhinitis.        History is obtained from the patient and electronic health record.     Past Medical History  Past  Medical History:   Diagnosis Date     Allergic rhinitis due to allergen      GERD (gastroesophageal reflux disease)      Hiatal hernia      Neck mass     surgically excised 2011       Past Surgical History  Past Surgical History:   Procedure Laterality Date     NECK SURGERY       NISSEN FUNDOPLICATION  2014       Hx of Blood transfusions/reactions: denies     Hx of abnormal bleeding or anti-platelet use: denies    Menstrual history: No LMP for male patient.    Steroid use in the last year: denies    Personal or FH with difficulty with Anesthesia:  denies    Prior to Admission Medications  Current Outpatient Prescriptions   Medication Sig Dispense Refill     Multiple Vitamins-Minerals (MULTIVITAMIN ADULT) CHEW Take by mouth daily       loratadine (CLARITIN) 10 MG tablet Take 10 mg by mouth as needed          Allergies  Allergies   Allergen Reactions     No Clinical Screening - See Comments Hives     Palms and bottom of feet itching and hives     Penicillin G Hives     Swelling and turned red     Penicillins Hives, Rash and Swelling       Social History  Social History     Social History     Marital status:      Spouse name: Kemi     Number of children: 4     Years of education: N/A     Occupational History     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     Social History Main Topics     Smoking status: Former Smoker     Packs/day: 1.00     Years: 5.00     Types: Cigarettes, Dip, chew, snus or snuff     Quit date: 9/1/1996     Smokeless tobacco: Former User     Alcohol use Yes      Comment: social     Drug use: No     Sexual activity: Yes     Partners: Female     Birth control/ protection: Female Surgical     Other Topics Concern     Not on file     Social History Narrative       Family History  Family History   Problem Relation Age of Onset     Anxiety Disorder Brother      Unknown/Adopted Brother      Colon Cancer Brother      DIABETES Maternal Grandmother      Hyperlipidemia Maternal Grandmother   "      ROS/MED HX    ENT/Pulmonary:     (+)tobacco use (5 pack years.  Quit both >15 years ago.), Past use 1 ppd packs/day  , . .    Neurologic:  - neg neurologic ROS     Cardiovascular:     (+) ----. : . . . :. Irregular Heartbeat/Palpitations ([facility] . Previous cardiac testing Echodate:results:date: results: date: results: date: results:          METS/Exercise Tolerance:  >4 METS   Hematologic:  - neg hematologic  ROS       Musculoskeletal:   (+) , , other musculoskeletal (Cervical neck pain.)-       GI/Hepatic:     (+) Asymptomatic on medication, hiatal hernia (s/p nissen fundoplication three years ago at [facility].),       Renal/Genitourinary:  - ROS Renal section negative       Endo:     (+) Other Endocrine Disorder recent episodes of hypoglycemia, evaluated by endrocrinolgy..      Psychiatric:  - neg psychiatric ROS       Infectious Disease:  - neg infectious disease ROS       Malignancy:   (+) Malignancy (Myloma>>>recent diagnosis.) History of Other  Other CA Active status post         Other: Comment: Intermittent pain in right elbow>>>takes Advil PRN.   (+) No chance of pregnancy C-spine cleared: N/A, no H/O Chronic Pain,no other significant disability               PAC Discussion and Assessment    ASA Classification: 2  Case is suitable for: [facility]  Anesthetic techniques and relevant risks discussed: GA  Invasive monitoring and risk discussed:   Types:   Possibility and Risk of blood transfusion discussed:   NPO instructions given:   Additional anesthetic preparation and risks discussed:   Needs early admission to pre-op area:   Other:     PAC Resident/NP Anesthesia Assessment:  Vince Krueger is a 41 year old male scheduled for       Temp: 98.6  F (37  C) Temp src: Oral BP: 126/83 Pulse: 59   Resp: 16 SpO2: 98 %         174 lbs 0 oz  6' 0\"   Body mass index is 23.6 kg/(m^2).       Physical Exam  Constitutional: Awake, alert, cooperative, no apparent distress, " and appears stated age.  Eyes: Pupils equal, round and reactive to light, extra ocular muscles intact, sclera clear, conjunctiva normal.  HENT: Normocephalic, oral pharynx with moist mucus membranes, good dentition, small mouth opening (~3FB). No goiter appreciated.   Respiratory: Clear to auscultation bilaterally, no crackles or wheezing.  Cardiovascular: Regular rate and rhythm, normal S1 and S2, and no murmur noted.  Carotids +2, no bruits. No edema. Palpable pulses to radial  DP and PT arteries.   GI: Normal bowel sounds, soft, non-distended, non-tender, no masses palpated, no hepatosplenomegaly.  Surgical scars: 1 cm well healed transverse scars at right upper quadrant, adjacent to umbilicus and left upper quadrant.  Lymph/Hematologic: No cervical lymphadenopathy and no supraclavicular lymphadenopathy.  Genitourinary:  na  Skin: Warm and dry.    Musculoskeletal: Full ROM of neck. There is no redness, warmth, or swelling of the joints. Gross motor strength is normal.  Humeral fracture brace orthosis and sling on right upper extremity.  Neurologic: Awake, alert, oriented to name, place and time. Cranial nerves II-XII are grossly intact. Gait is normal.   Neuropsychiatric: Calm, cooperative. Normal affect.     Labs: (personally reviewed)  Lab Results   Component Value Date    WBC 4.8 05/04/2017     Lab Results   Component Value Date    RBC 5.50 05/04/2017     Lab Results   Component Value Date    HGB 16.0 05/04/2017     Lab Results   Component Value Date    HCT 49.6 05/04/2017     Lab Results   Component Value Date    MCV 90 05/04/2017     Lab Results   Component Value Date    MCH 29.1 05/04/2017     Lab Results   Component Value Date    MCHC 32.3 05/04/2017     Lab Results   Component Value Date    RDW 13.3 05/04/2017     Lab Results   Component Value Date     05/04/2017     Last Basic Metabolic Panel:  Lab Results   Component Value Date     05/11/2017      Lab Results   Component Value Date     POTASSIUM 4.5 05/11/2017     Lab Results   Component Value Date    CHLORIDE 103 05/11/2017     Lab Results   Component Value Date    NIKOLAY 9.1 05/11/2017     Lab Results   Component Value Date    CO2 29 05/11/2017     Lab Results   Component Value Date    BUN 12 05/11/2017     Lab Results   Component Value Date    CR 0.88 05/11/2017     Lab Results   Component Value Date     05/11/2017       Lab Results   Component Value Date    AST 13 05/11/2017     Lab Results   Component Value Date    ALT 21 05/11/2017     No results found for: BILICONJ   Lab Results   Component Value Date    BILITOTAL 0.5 05/11/2017     Lab Results   Component Value Date    ALBUMIN 3.9 05/11/2017     Lab Results   Component Value Date    PROTTOTAL 8.5 05/11/2017      Lab Results   Component Value Date    ALKPHOS 56 05/11/2017       EKG: not necessary based on ACC/AHA guidelines.    Procedures    EXAMINATION: NM BONE SCAN WHOLE BODY  Whole-body bone scan, 5/9/2017 12:02 PM      IMPRESSION: Focal radiotracer uptake in the mid/distal right humerus  at site of patient's known right humeral lesion. No evidence of  additional osteoblastic lesions.    EXAM: MR Right upper extremity with contrast 5/9/2017 10:23 AM     Impression:  Marrow infiltrating process in the mid shaft right humerus with  cortical disruption and soft tissue extension. Findings are most  consistent with marrow infiltrating disease such as multiple myeloma,  metastasis, and lymphoma, less likely a primary bone tumor. Given  recent CT, findings are concerning for a myelomatous lesion, however  definitive diagnosis by histopathology per orthopedic oncology.    EXAMINATION: CT CHEST/ABDOMEN/PELVIS W CONTRAST, 5/4/2017 11:55 AM  IMPRESSION:   1. There are 2 liver lesions, the larger of which demonstrates nodular  enhancement and appearance favors a hemangioma. The smaller lesion has  slightly irregular margins with questionable adjacent enhancement.  Although appearance favors  hemangioma, additional evaluation with MRI  may be considered, given history of malignancy.  2. There are a few pulmonary nodules, the largest is in the left lower  lobe measuring 6 mm. These are indeterminate given no prior studies  for comparison.   3. Small subcentimeter sclerotic lesion in the left iliac bone,  appearance favors bone island.      Stress Echo 9/12/2014  Summary:   1. Normal stress echocardiogram with no inducible wall motion abnormalities at stress.   2. There were no ischemic changes by EKG during stress.   3. LV function is normal. The visually estimated ejection fraction is 65% at rest.   4. Due to inadequate tricuspid regurgitant velocity, unable to calculate pulmonary artery systolic pressure.   5. No anginal symptoms with exercise.   6. Target heart rate achieved.   7. Normal exercise capacity.      Holter 6/30/2014  INTERPRETATION SUMMARY:  1.  Primary rhythm was sinus rhythm with rare premature atrial contraction or premature ventricular contraction.    2.  No sustained ventricular or supraventricular dysrhythmias.  3.  Five-beat high rate atrial event likely representative of brief episode of atrial tachycardia was present.  4.  No significant pauses were noted.  5.  No diary was enclosed for symptomatic correlation.    Outside records reviewed from: Care Everywhere    ASSESSMENT and PLAN  Vince Krueger is a 41 year old male scheduled to undergo Biopsy, Curettage and Internal Fixation Right Humerus, and Supplemental Fixation with Zometa-Impregnated Cement with Dr. Hudson on 5/19/2017 at [facility] under general anesthesia.       Cardiology - RCRI : No serious cardiac risks.  0.4 % risk of major adverse cardiac event. METS >4.        - Palpitations - evaluated by cardiology 2014 with Holter Monitor and Echo.  Holter monitor demonstrated: sinus rhythm with rare premature atrial contraction or premature ventricular contraction; no sustained ventricular  or supraventricular dysrhythmias.  Normal stress echocardiogram (2014) with no wall motion abnormality at stress, no ischemic changes, LVEF 65%.  Pulmonary - remote smoking history.  GI - Hiatal hernia, s/p nissen fundoplication, 2014>>> GERD symptoms resolved  Endocrine - recently evaluated by endocrine for hypoglycemia  Musculoskeletal - osteolytic lesion right humeral diaphysis with high risk of pathological fracture>>>surgery as above.    Skin - mass excision of right neck, 2011    He has the following specific operative considerations:   - Anesthesia considerations:  Refer to PAC assessment in anesthesia records  - VTE risk:  0.5-3%  - CAT # of risks 1/8 = low risk  - Risk of PONV score = 1.  If > 2, anti-emetic intervention recommended.    Arrival time, NPO, shower and medication instructions provided by nursing staff today.  Preparing For Your Surgery handout given.  Patient was discussed with Dr Alicia. I spent 20 minutes face to face with patient, assessing, examining, and educating.      ZANA Perez CNP  Preoperative Assessment Center  [facility]  [facility]  Phone: [phone]  Fax: [phone]

## 2017-05-11 NOTE — ANESTHESIA PREPROCEDURE EVALUATION
Anesthesia Evaluation     . Pt has had prior anesthetic. Type: General    No history of anesthetic complications          ROS/MED HX    ENT/Pulmonary:     (+)tobacco use (5 pack years.  Quit both >15 years ago.), Past use 1 ppd packs/day  , . .    Neurologic:  - neg neurologic ROS     Cardiovascular:     (+) ----. : . . . :. Irregular Heartbeat/Palpitations (Evaluated in 2014 by cardiology>>>[facility].), . Previous cardiac testing Echodate:results:date: results: date: results: date: results:          METS/Exercise Tolerance:  >4 METS   Hematologic:  - neg hematologic  ROS       Musculoskeletal:   (+) , , other musculoskeletal (Cervical neck pain.)-       GI/Hepatic:     (+) Asymptomatic on medication, hiatal hernia (s/p nissen fundoplication three years ago at [facility].),       Renal/Genitourinary:  - ROS Renal section negative       Endo:     (+) Other Endocrine Disorder recent episodes of hypoglycemia, evaluated by endrocrinolgy..      Psychiatric:  - neg psychiatric ROS       Infectious Disease:  - neg infectious disease ROS       Malignancy:   (+) Malignancy (Myloma>>>recent diagnosis.) History of Other  Other CA Active status post         Other: Comment: Intermittent pain in right elbow>>>takes Advil PRN.   (+) No chance of pregnancy C-spine cleared: N/A, no H/O Chronic Pain,no other significant disability                    Physical Exam  Normal systems: cardiovascular, pulmonary and dental    Airway   Mallampati: II  TM distance: >3 FB  Neck ROM: full  Comment: Smaller mouth opening but still 3 FB.    Dental     Cardiovascular   Rhythm and rate: regular and normal      Pulmonary    breath sounds clear to auscultation    Other findings: Labs  Lab Results      Component                Value               Date                      WBC                      4.8                 05/04/2017            Lab Results      Component                Value               Date                      RBC                       5.50                05/04/2017            Lab Results      Component                Value               Date                      HGB                      16.0                05/04/2017            Lab Results      Component                Value               Date                      HCT                      49.6                05/04/2017            Lab Results      Component                Value               Date                      MCV                      90                  05/04/2017            Lab Results      Component                Value               Date                      MCH                      29.1                05/04/2017            Lab Results      Component                Value               Date                      MCHC                     32.3                05/04/2017            Lab Results      Component                Value               Date                      RDW                      13.3                05/04/2017            Lab Results      Component                Value               Date                      PLT                      244                 05/04/2017                Last Basic Metabolic Panel:  Lab Results      Component                Value               Date                      NA                       138                 05/11/2017             Lab Results      Component                Value               Date                      POTASSIUM                4.5                 05/11/2017            Lab Results      Component                Value               Date                      CHLORIDE                 103                 05/11/2017            Lab Results      Component                Value               Date                      NIKOLAY                      9.1                 05/11/2017            Lab Results      Component                Value               Date                      CO2                      29                  05/11/2017            Lab  Results      Component                Value               Date                      BUN                      12                  05/11/2017            Lab Results      Component                Value               Date                      CR                       0.88                05/11/2017            Lab Results      Component                Value               Date                      GLC                      130                 05/11/2017              Lab Results      Component                Value               Date                      AST                      13                  05/11/2017            Lab Results      Component                Value               Date                      ALT                      21                  05/11/2017            No results found for: BILICONJ   Lab Results      Component                Value               Date                      BILITOTAL                0.5                 05/11/2017            Lab Results      Component                Value               Date                      ALBUMIN                  3.9                 05/11/2017            Lab Results      Component                Value               Date                      PROTTOTAL                8.5                 05/11/2017             Lab Results      Component                Value               Date                      ALKPHOS                  56                  05/11/2017              Procedures    EXAMINATION: NM BONE SCAN WHOLE BODY  Whole-body bone scan, 5/9/2017 12:02 PM      IMPRESSION: Focal radiotracer uptake in the mid/distal right humerus  at site of patient's known right humeral lesion. No evidence of  additional osteoblastic lesions.    EXAM: MR Right upper extremity with contrast 5/9/2017 10:23 AM     Impression:  Marrow infiltrating process in the mid shaft right humerus with  cortical disruption and soft tissue extension. Findings are most  consistent with marrow infiltrating  disease such as multiple myeloma,  metastasis, and lymphoma, less likely a primary bone tumor. Given  recent CT, findings are concerning for a myelomatous lesion, however  definitive diagnosis by histopathology per orthopedic oncology.    EXAMINATION: CT CHEST/ABDOMEN/PELVIS W CONTRAST, 5/4/2017 11:55 AM  IMPRESSION:   1. There are 2 liver lesions, the larger of which demonstrates nodular  enhancement and appearance favors a hemangioma. The smaller lesion has  slightly irregular margins with questionable adjacent enhancement.  Although appearance favors hemangioma, additional evaluation with MRI  may be considered, given history of malignancy.  2. There are a few pulmonary nodules, the largest is in the left lower  lobe measuring 6 mm. These are indeterminate given no prior studies  for comparison.   3. Small subcentimeter sclerotic lesion in the left iliac bone,  appearance favors bone island.      Stress Echo 9/12/2014  Summary:   1. Normal stress echocardiogram with no inducible wall motion abnormalities at stress.   2. There were no ischemic changes by EKG during stress.   3. LV function is normal. The visually estimated ejection fraction is 65% at rest.   4. Due to inadequate tricuspid regurgitant velocity, unable to calculate pulmonary artery systolic pressure.   5. No anginal symptoms with exercise.   6. Target heart rate achieved.   7. Normal exercise capacity.      Holter 6/30/2014  INTERPRETATION SUMMARY:  1.  Primary rhythm was sinus rhythm with rare premature atrial contraction or premature ventricular contraction.    2.  No sustained ventricular or supraventricular dysrhythmias.  3.  Five-beat high rate atrial event likely representative of brief episode of atrial tachycardia was present.  4.  No significant pauses were noted.  5.  No diary was enclosed for symptomatic correlation.           PAC Discussion and Assessment    ASA Classification: 2  Case is suitable for: [place]  Anesthetic techniques  and relevant risks discussed: GA  Invasive monitoring and risk discussed:   Types:   Possibility and Risk of blood transfusion discussed:   NPO instructions given:   Additional anesthetic preparation and risks discussed:   Needs early admission to pre-op area:   Other:     PAC Resident/NP Anesthesia Assessment:  Vince Krueger is a 41 year old male scheduled for Biopsy, Curettage and Internal Fixation Right Humerus, and Supplemental Fixation with Zometa-Impregnated Cement with Dr. Hudson on 5/19/2017 [facility] under general anesthesia.  Mr. Krueger started to notice pain in his right elbow about 1   years ago.  He was diagnosed with tendonitis and received therapies including steroid injection and physical therapy.  When he continued did not receive relief, he went to his wives chiropractor.  The chiropractor did an x-ray and noted a lesion on right humerus. He was then referred to Dr. Hudson on 5/4/2017 for orthopedic surgery consultation for osteolytic lesion of right humeral diaphysis.  Dr. Hudson ordered an MRI which demonstrated marrow infiltrating process in the mid shaft right humerus with cortical disruption and soft tissue extension. Findings are most consistent with marrow infiltrating disease such as multiple myeloma, metastasis, and lymphoma, less likely a primary bone tumor. Dr. Hudson recommended the above surgical intervention.    Mr. Krueger presents to PAC clinic with his wife, Kemi.  His right upper extremity is in a humeral fracture brace orthosis and sling.  He has intermittent pain in right elbow which is relieved with Advil.  He would like to proceed with above surgical intervention.    PAC referral for risk assessment and optimization of anesthesia with comorbid conditions of:  hiatal hernia, s/p Tanmay fundoplication, 2014; mass on right neck, s/p excision 2011; h/o palpitations; and allergic rhinitis.        Cardiology - RCRI : No serious cardiac risks.   0.4 % risk of major adverse cardiac event. METS >4.        - Palpitations - evaluated by cardiology 2014 with Holter Monitor and Echo.  Holter monitor demonstrated: sinus rhythm with rare premature atrial contraction or premature ventricular contraction; no sustained ventricular or supraventricular dysrhythmias.  Normal stress echocardiogram (2014) with no wall motion abnormality stress, no ischemic changes, LVEF 65%.  Pulmonary - remote smoking history.  GI - Hiatal hernia, s/p nissen fundoplication, 2014>>>symptoms resolved  Endocrine - recently evaluated by endocrine for hypoglycemia  Musculoskeletal - osteolytic lesion right humeral diaphysis with high risk of pathological fracture.  Surgery as above.    Skin - mass excision of right neck, 2011    He has the following specific operative considerations:   - Anesthesia considerations:  Refer to PAC assessment in anesthesia records  - VTE risk:  0.5-3%  - CAT # of risks 1/8 = low risk  - Risk of PONV score = 1.  If > 2, anti-emetic intervention recommended.    Arrival time, NPO, shower and medication instructions provided by nursing staff today.  Preparing For Your Surgery handout given.  Patient was discussed with Dr Alicia. I spent 20 minutes face to face with patient, assessing, examining, and educating.      Reviewed and Signed by PAC Mid-Level Provider/Resident  Mid-Level Provider/Resident: Mattie SPANN CNP  Date: 5/11/2017  Time: 14:50    Attending Anesthesiologist Anesthesia Assessment:  I have reviewed the chart and examined the patient.  I have discussed the patient with the SUSAN and concur with her assessment.  The patient had no cardiac or pulmonary co morbidities.  He has had no GERD since his Nissen fundoplication.  No further testing is required.  I discussed regional and GETA with the patient.  Final plan per attending anesthesiologist the day of surgery.    Reviewed and Signed by PAC Anesthesiologist  Anesthesiologist: Spike Alicia,  MD  Date: 05/11/2017  Time:   Pass/Fail:   Disposition:     PAC Pharmacist Assessment:        Pharmacist:   Date:   Time:      Anesthesia Plan      History & Physical Review  History and physical reviewed and following examination; no interval change.    ASA Status:  2 .    NPO Status:  > 8 hours    Plan for General, ETT and Periph. Nerve Block for postop pain with Intravenous induction. Maintenance will be TIVA.    PONV prophylaxis:  Ondansetron (or other 5HT-3) and Dexamethasone or Solumedrol       Postoperative Care  Postoperative pain management:  IV analgesics and Multi-modal analgesia.      Consents  Anesthetic plan, risks, benefits and alternatives discussed with:  Patient..                          .

## 2017-05-12 LAB — B2 MICROGLOB SERPL-MCNC: 1.6 MG/L

## 2017-05-17 ENCOUNTER — OFFICE VISIT (OUTPATIENT)
Dept: TRANSPLANT | Facility: CLINIC | Age: 42
End: 2017-05-17
Attending: INTERNAL MEDICINE
Payer: COMMERCIAL

## 2017-05-17 VITALS
DIASTOLIC BLOOD PRESSURE: 85 MMHG | HEART RATE: 98 BPM | RESPIRATION RATE: 16 BRPM | SYSTOLIC BLOOD PRESSURE: 133 MMHG | OXYGEN SATURATION: 98 % | BODY MASS INDEX: 23.61 KG/M2 | WEIGHT: 174.1 LBS | TEMPERATURE: 98 F

## 2017-05-17 DIAGNOSIS — M89.9 LYTIC LESION OF BONE ON X-RAY: Primary | ICD-10-CM

## 2017-05-17 PROCEDURE — 36415 COLL VENOUS BLD VENIPUNCTURE: CPT | Performed by: INTERNAL MEDICINE

## 2017-05-17 PROCEDURE — 99212 OFFICE O/P EST SF 10 MIN: CPT

## 2017-05-17 PROCEDURE — 82784 ASSAY IGA/IGD/IGG/IGM EACH: CPT | Performed by: INTERNAL MEDICINE

## 2017-05-17 PROCEDURE — 86334 IMMUNOFIX E-PHORESIS SERUM: CPT | Performed by: INTERNAL MEDICINE

## 2017-05-17 NOTE — MR AVS SNAPSHOT
After Visit Summary   5/17/2017    Vince Krueger    MRN: [id number]           Patient Information     Date Of Birth          1975        Visit Information        Provider Department      5/17/2017 5:00 PM Andre Panda MD [facility] Blood and Marrow Transplant        Today's Diagnoses     Lytic lesion of bone on x-ray    -  1          Cl[facility]ics and Surgery Center ([facility])  [address] [place] [id number]  Phone: [phone]  Clinic Hours:   Monday-Thursday: 7am to 7pm   Friday: 7am to 5:30pm   Weekends and holidays:    8am to noon (in general)  If your fever is 100.5  or greater,   call the clinic.  After hours call the   hospital at [phone] or   [phone]. Ask for the BMT   fellow on-call            Follow-ups after your visit        Your next 10 appointments already scheduled     May 26, 2017  4:00 PM CDT   RETURN ONC with Andre Panda MD   [facility] Blood and Marrow Transplant ([facility])    [address] [place] [id number] [phone]            Jun 01, 2017 11:45 AM CDT   (Arrive by 11:30 AM)   Return Visit with John Hudson MD   [facility] ([facility])    [address] [place] [id number] [phone]              Who to contact     If you have questions or need follow up information about today's clinic visit or your schedule please contact [facility] BLOOD AND MARROW TRANSPLANT directly at [phone].  Normal or non-critical lab and imaging results will be communicated to you by MyChart, letter or phone within 4 business days after the clinic has received the results. If you do not hear from us within 7 days, please contact the clinic through MyChart or phone. If you have a critical or abnormal lab result, we will notify you by phone as soon as possible.  Submit refill requests through [employer] or call your pharmacy and they will forward  the refill request to us. Please allow 3 business days for your refill to be completed.          Additional Information About Your Visit        [employer] Information     [employer] gives you secure access to your electronic health record. If you see a primary care provider, you can also send messages to your care team and make appointments. If you have questions, please call your primary care clinic.  If you do not have a primary care provider, please call [phone] and they will assist you.        Care EveryWhere ID     This is your Care EveryWhere ID. This could be used by other organizations to access your [facility] medical records  [id number]        Your Vitals Were     Pulse Temperature Respirations Pulse Oximetry BMI (Body Mass Index)       98 98  F (36.7  C) (Oral) 16 98% 23.61 kg/m2        Blood Pressure from Last 3 Encounters:   05/20/17 114/65   05/17/17 133/85   05/11/17 126/83    Weight from Last 3 Encounters:   05/19/17 75.6 kg (166 lb 10.7 oz)   05/17/17 79 kg (174 lb 1.6 oz)   05/11/17 78.9 kg (174 lb)              We Performed the Following     Protein Immunofixation Serum        Recent Review Flowsheet Data     BMT Recent Results Latest Ref Rng & Units 5/4/2017 5/11/2017 5/19/2017 5/20/2017    WBC 4.0 - 11.0 10e9/L 4.8 - 11.4(H) -    Hemoglobin 13.3 - 17.7 g/dL 16.0 - 11.8(L) 11.6(L)    Platelet Count 150 - 450 10e9/L 244 - 186 -    INR 0.86 - 1.14 - - 1.03 -    Sodium 133 - 144 mmol/L - 138 - -    Potassium 3.4 - 5.3 mmol/L - 4.5 - -    Chloride 94 - 109 mmol/L - 103 - -    Glucose 70 - 99 mg/dL - 130(H) - -    Urea Nitrogen 7 - 30 mg/dL 16 12 - -    Creatinine 0.66 - 1.25 mg/dL 0.96 0.88 - -    Calcium (Total) 8.5 - 10.1 mg/dL 9.5 9.1 - -    Protein (Total) 6.8 - 8.8 g/dL - 8.5 - -    Albumin 3.4 - 5.0 g/dL - 3.9 - -    Alkaline Phosphatase 40 - 150 U/L 63 56 - -    AST 0 - 45 U/L - 13 - -    ALT 0 - 70 U/L - 21 - -    MCV 78 - 100 fl 90 - 89 -               Primary Care Provider Office Phone # Fax  #    Tanya Wen, MIGUEL ÁNGEL [phone]       [facility] [address] [place] [address]        Thank you!     Thank you for choosing [facility] BLOOD AND MARROW TRANSPLANT  for your care. Our goal is always to provide you with excellent care. Hearing back from our patients is one way we can continue to improve our services. Please take a few minutes to complete the written survey that you may receive in the mail after your visit with us. Thank you!             Your Updated Medication List - Protect others around you: Learn how to safely use, store and throw away your medicines at www.disposemymeds.org.          This list is accurate as of: 5/17/17 11:59 PM.  Always use your most recent med list.                   Brand Name Dispense Instructions for use    loratadine 10 MG tablet    CLARITIN     Take 10 mg by mouth as needed       MULTIVITAMIN ADULT Chew      Take by mouth daily       oxyCODONE 5 MG IR tablet    ROXICODONE    60 tablet    Take 1-2 tablets (5-10 mg) by mouth every 3 hours as needed for pain or other (Moderate to Severe)

## 2017-05-17 NOTE — NURSING NOTE
Oncology Rooming Note    May 17, 2017 4:40 PM   Vince Krueger is a 41 year old male who presents for:    Chief Complaint   Patient presents with     Oncology Clinic Visit     Pt is here as a newly dx Myeloma pt.      Initial Vitals: /85 (BP Location: Left arm, Patient Position: Left side, Cuff Size: Adult Regular)  Pulse 98  Temp 98  F (36.7  C) (Oral)  Resp 16  Wt 79 kg (174 lb 1.6 oz)  SpO2 98%  BMI 23.61 kg/m2 Estimated body mass index is 23.61 kg/(m^2) as calculated from the following:    Height as of 5/11/17: 1.829 m (6').    Weight as of this encounter: 79 kg (174 lb 1.6 oz). Body surface area is 2 meters squared.  Data Unavailable Comment: Data Unavailable   No LMP for male patient.  Allergies reviewed: Yes  Medications reviewed: Yes    Medications: Medication refills not needed today.  Pharmacy name entered into EPIC: [id number] - [address]    Clinical concerns: none MD was NOT notified.    6 minutes for nursing intake (face to face time)     Caitie Macias MA

## 2017-05-17 NOTE — PROGRESS NOTES
[facility] Consultation Note    DOS: May 17, 2017    John Hudson MD   PHYSICIANS  [address]  [place] [address]    RE:  Vince Krueger      Dear: Dr. Hudson    I appreciate the opportunity to see your patient in a requested consultation regarding diagnostic and possible therapy options for what I expect to be a solitary plasma vs. perhaps smoldering myeloma. I agree with proceeding to fixation and biopsy of the lesion as you have planned. We'll likely need a bone marrow biopsy as well at some point but I will be out of town the day you have surgery planned and there are no other staff free to do it at this late notice in the OR.  We'll get this, urine studies and other blood work today and at a later date.  Once we have path, I'll have Vince follow-up in [facility] and go forward from there.     If there are questions regarding the recommendations below please do not hesitate to contact me at [phone]. I look forward to being involved in Vince's care.    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      Medical Decision Making/Recommendations: I had a 90 minute today with Vince and his wife, >50% of that in face-to-face counseling going over the natural history of plasma cell disorders and their work-up. Salient features of this case include: presentation with seemingly a single large bone lesion, M-spike found on SPEP      Most of of our meeting was spent in discussing the various plasma cell disorders, what defines them and what we should do rule out active, i.e. Treatment-requiring myeloma. This would, at some point likely include a bone marrow biospy, 24 hour urine and a few other labs to better characterize his plasma cell disorder.      It is my recommendation that, for this  "caseVince proceed with biopsy and fixation as you have planned.  We will consider, once the path is back, if a bone marrow biopsy is absolutely needed, but we will proceed with a 24 hour urine and more blood testing today.      When all questions were answered to what I believe was Vince and his wife's satisfaction we concluded our visit. I will see them again in 1 week's time.         Reason for consultation: treatment for myeloma    HPI: 42 yo M who states he just hasn't felt well for now >1 year. ABout 1 year began to have pain in the R upper arm. Had PT prescribed for a \"tennis elbow\" which never helped. Trials of injections (presumably of steroids) and of patches also failed to help.  Couldn't convince providers to get an Xray.  - Ended up going to see a chiropractor who did perform an Xray and noted a lytic lesion. Was seen at [facility] who referred patient here to Dr. Hudson. He is now scheduled to see Dr. Hudson in the OR for likely biopsy and fixation of this lesion in 2 days.    ROS: Pain in R arm with ROM limitation  - Some intermittent episodes of unexplained hypoglycemia; Neg work/up by Endo at [facility]. Not had an episode lately.  - has new pain in R side x few weeks. Dull and achy at times.   - Rest of 10 point ROS was negative.     Past Medical History  Past Medical History:   Diagnosis Date     Allergic rhinitis due to allergen      GERD (gastroesophageal reflux disease)      Hiatal hernia      Neck mass:  surgically excised 2011; had a cavernous hemangioma in a left neck cyst/node     H/o unexplained hypoglycemia     Negative stress echo in 2014- SOB/Palpitations     H/o tubular adenoma 2015; sessile serrated polyp 2010      Past Surgical History  Past Surgical History:   Procedure Laterality Date     NECK SURGERY       NISSEN FUNDOPLICATION  2014          Allergies   Allergen Reactions     Penicillin G Hives     Swelling and turned red       Medications: see EMR for list    Family " History  Father (): passed in a MVA- no real family history known.  Mother (65, alive): good health  Brother (45, [place]): h/o colon ca at 38y/anxiety  Sister (half-sister 40): healthy    No known blood or other cancer d/o's in family.    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      Social History Main Topics     Smoking status: Former Smoker     Packs/day: 1.00     Years: 5.00     Types: Cigarettes, Dip, chew, snus or snuff     Quit date: 1996     Smokeless tobacco: Former User     Alcohol use Yes      Comment: social     Drug use: No     Sexual activity: Yes     Partners: Female     Birth control/ protection: Female Surgical     Examination:   /85 (BP Location: Left arm, Patient Position: Left side, Cuff Size: Adult Regular)  Pulse 98  Temp 98  F (36.7  C) (Oral)  Resp 16  Wt 79 kg (174 lb 1.6 oz)  SpO2 98%  BMI 23.61 kg/m2  Wt Readings from Last 5 Encounters:   17 79 kg (174 lb 1.6 oz)   17 78.9 kg (174 lb)   17 78.9 kg (174 lb)   17 78.7 kg (173 lb 6.4 oz)     KPS:80% (has painful limitation of ROM of R arm)    General: AAO/NAD; cooperative. HEENT: AT head. PERRLB, EOMIB, no scleral icterus. EACs patent with normal cones of light at TMs. Nostrils without obvious signs of drainage or epistaxis. MMM, dental hygiene adequate. Neck: No JVD evident. No TM. Lymph: No palpable cervical, supraclavicular, axiallry or inguinal LAD. CV: RRR, no obvious murmur.  Resp: CTAB. Abd: soft/NT/ND without organomegaly or masses. Musculoskeletal : no edema in LEs. No real enlargement of RUE or swelling. Pain with lifting, extending and twisting R arm.  Skin: no obvious  rashes on exposed skin. Nro: grossly non-focal with CN 2-12 intact. Psych/Affect: appropriate with good insight.     Labs:   Ref. Range 5/4/2017 10:54 5/19/2017 23:06   WBC Latest Ref Range: 4.0 - 11.0 10e9/L 4.8 11.4 (H)   Hemoglobin Latest Ref Range: 13.3 - 17.7 g/dL 16.0 11.8 (L)   Hematocrit Latest Ref Range: 40.0 - 53.0 % 49.6 36.2 (L)   Platelet Count Latest Ref Range: 150 - 450 10e9/L 244 186   MCV Latest Ref Range: 78 - 100 fl 90 89   Sed Rate Latest Ref Range: 0 - 15 mm/h 7      Recent Labs   Lab Test  05/11/17   1506  05/04/17   1054   NA  138   --    POTASSIUM  4.5   --    CHLORIDE  103   --    CO2  29   --    ANIONGAP  6   --    GLC  130*   --    BUN  12  16   CR  0.88  0.96   NIKOLAY  9.1  9.5     Recent Labs   Lab Test  05/11/17   1506   PROTTOTAL  8.5   ALBUMIN  3.9   BILITOTAL  0.5   ALKPHOS  56   AST  13   ALT  21      5/4/2017 10:54 5/11/2017 15:06   Beta-2-Microglobulin  1.6   Lactate Dehydrogenase 132         5/4/2017 10:54   [place] Free Lt Chain 5.13 (H)   Kappa Lambda Ratio 2.25 (H)   Lambda Free Lt Chain 2.28   Monoclonal Peak 1.6 (H)

## 2017-05-18 LAB
IGA SERPL-MCNC: 249 MG/DL (ref 70–380)
IGG SERPL-MCNC: 2240 MG/DL (ref 695–1620)
IGM SERPL-MCNC: 97 MG/DL (ref 60–265)
IMMUNOFIXATION ELP: ABNORMAL

## 2017-05-19 ENCOUNTER — ANESTHESIA (OUTPATIENT)
Dept: SURGERY | Facility: CLINIC | Age: 42
DRG: 825 | End: 2017-05-19
Payer: COMMERCIAL

## 2017-05-19 ENCOUNTER — HOSPITAL ENCOUNTER (INPATIENT)
Facility: CLINIC | Age: 42
LOS: 1 days | Discharge: HOME | DRG: 825 | End: 2017-05-20
Attending: ORTHOPAEDIC SURGERY | Admitting: ORTHOPAEDIC SURGERY
Payer: COMMERCIAL

## 2017-05-19 ENCOUNTER — APPOINTMENT (OUTPATIENT)
Dept: GENERAL RADIOLOGY | Facility: CLINIC | Age: 42
DRG: 825 | End: 2017-05-19
Attending: ORTHOPAEDIC SURGERY
Payer: COMMERCIAL

## 2017-05-19 DIAGNOSIS — M89.9 LYTIC LESION OF BONE ON X-RAY: Primary | ICD-10-CM

## 2017-05-19 PROBLEM — S42.309A HUMERUS FRACTURE: Status: ACTIVE | Noted: 2017-05-19

## 2017-05-19 LAB
ABO + RH BLD: NORMAL
ABO + RH BLD: NORMAL
APTT PPP: 27 SEC (ref 22–37)
BLD GP AB SCN SERPL QL: NORMAL
BLOOD BANK CMNT PATIENT-IMP: NORMAL
ERYTHROCYTE [DISTWIDTH] IN BLOOD BY AUTOMATED COUNT: 13.4 % (ref 10–15)
HCT VFR BLD AUTO: 36.2 % (ref 40–53)
HGB BLD-MCNC: 11.8 G/DL (ref 13.3–17.7)
INR PPP: 1.03 (ref 0.86–1.14)
MCH RBC QN AUTO: 29 PG (ref 26.5–33)
MCHC RBC AUTO-ENTMCNC: 32.6 G/DL (ref 31.5–36.5)
MCV RBC AUTO: 89 FL (ref 78–100)
PLATELET # BLD AUTO: 186 10E9/L (ref 150–450)
RBC # BLD AUTO: 4.07 10E12/L (ref 4.4–5.9)
SPECIMEN EXP DATE BLD: NORMAL
WBC # BLD AUTO: 11.4 10E9/L (ref 4–11)

## 2017-05-19 PROCEDURE — 37000008 ZZH ANESTHESIA TECHNICAL FEE, 1ST 30 MIN: Performed by: ORTHOPAEDIC SURGERY

## 2017-05-19 PROCEDURE — 25000125 ZZHC RX 250: Performed by: NURSE ANESTHETIST, CERTIFIED REGISTERED

## 2017-05-19 PROCEDURE — 88341 IMHCHEM/IMCYTCHM EA ADD ANTB: CPT | Performed by: ORTHOPAEDIC SURGERY

## 2017-05-19 PROCEDURE — C1713 ANCHOR/SCREW BN/BN,TIS/BN: HCPCS | Performed by: ORTHOPAEDIC SURGERY

## 2017-05-19 PROCEDURE — 27810169 ZZH OR IMPLANT GENERAL: Performed by: ORTHOPAEDIC SURGERY

## 2017-05-19 PROCEDURE — 88307 TISSUE EXAM BY PATHOLOGIST: CPT | Mod: 26 | Performed by: ORTHOPAEDIC SURGERY

## 2017-05-19 PROCEDURE — 25000566 ZZH SEVOFLURANE, EA 15 MIN: Performed by: ORTHOPAEDIC SURGERY

## 2017-05-19 PROCEDURE — 36000066 ZZH SURGERY LEVEL 4 W FLUORO 1ST 30 MIN - UMMC: Performed by: ORTHOPAEDIC SURGERY

## 2017-05-19 PROCEDURE — 88307 TISSUE EXAM BY PATHOLOGIST: CPT | Performed by: ORTHOPAEDIC SURGERY

## 2017-05-19 PROCEDURE — 88342 IMHCHEM/IMCYTCHM 1ST ANTB: CPT | Mod: 26 | Performed by: ORTHOPAEDIC SURGERY

## 2017-05-19 PROCEDURE — 37000009 ZZH ANESTHESIA TECHNICAL FEE, EACH ADDTL 15 MIN: Performed by: ORTHOPAEDIC SURGERY

## 2017-05-19 PROCEDURE — 85027 COMPLETE CBC AUTOMATED: CPT | Performed by: INTERNAL MEDICINE

## 2017-05-19 PROCEDURE — 99232 SBSQ HOSP IP/OBS MODERATE 35: CPT | Performed by: INTERNAL MEDICINE

## 2017-05-19 PROCEDURE — 36415 COLL VENOUS BLD VENIPUNCTURE: CPT | Performed by: NURSE PRACTITIONER

## 2017-05-19 PROCEDURE — 25000125 ZZHC RX 250: Performed by: ORTHOPAEDIC SURGERY

## 2017-05-19 PROCEDURE — S0077 INJECTION, CLINDAMYCIN PHOSP: HCPCS | Performed by: ORTHOPAEDIC SURGERY

## 2017-05-19 PROCEDURE — 0PUF0JZ SUPPLEMENT RIGHT HUMERAL SHAFT WITH SYNTHETIC SUBSTITUTE, OPEN APPROACH: ICD-10-PCS | Performed by: ORTHOPAEDIC SURGERY

## 2017-05-19 PROCEDURE — 25000132 ZZH RX MED GY IP 250 OP 250 PS 637: Performed by: ORTHOPAEDIC SURGERY

## 2017-05-19 PROCEDURE — 36415 COLL VENOUS BLD VENIPUNCTURE: CPT | Performed by: INTERNAL MEDICINE

## 2017-05-19 PROCEDURE — 88342 IMHCHEM/IMCYTCHM 1ST ANTB: CPT | Performed by: ORTHOPAEDIC SURGERY

## 2017-05-19 PROCEDURE — 99207 ZZC CONSULT E&M CHANGED TO SUBSEQUENT LEVEL: CPT | Performed by: INTERNAL MEDICINE

## 2017-05-19 PROCEDURE — 36000064 ZZH SURGERY LEVEL 4 EA 15 ADDTL MIN - UMMC: Performed by: ORTHOPAEDIC SURGERY

## 2017-05-19 PROCEDURE — 25000128 H RX IP 250 OP 636: Performed by: NURSE ANESTHETIST, CERTIFIED REGISTERED

## 2017-05-19 PROCEDURE — 71000014 ZZH RECOVERY PHASE 1 LEVEL 2 FIRST HR: Performed by: ORTHOPAEDIC SURGERY

## 2017-05-19 PROCEDURE — 25000132 ZZH RX MED GY IP 250 OP 250 PS 637: Performed by: ANESTHESIOLOGY

## 2017-05-19 PROCEDURE — 25000128 H RX IP 250 OP 636: Performed by: ORTHOPAEDIC SURGERY

## 2017-05-19 PROCEDURE — 40000278 XR SURGERY CARM FLUORO LESS THAN 5 MIN: Mod: TC

## 2017-05-19 PROCEDURE — 0PBF0ZZ EXCISION OF RIGHT HUMERAL SHAFT, OPEN APPROACH: ICD-10-PCS | Performed by: ORTHOPAEDIC SURGERY

## 2017-05-19 PROCEDURE — 40000170 ZZH STATISTIC PRE-PROCEDURE ASSESSMENT II: Performed by: ORTHOPAEDIC SURGERY

## 2017-05-19 PROCEDURE — 27210794 ZZH OR GENERAL SUPPLY STERILE: Performed by: ORTHOPAEDIC SURGERY

## 2017-05-19 PROCEDURE — 85610 PROTHROMBIN TIME: CPT | Performed by: NURSE PRACTITIONER

## 2017-05-19 PROCEDURE — 12000001 ZZH R&B MED SURG/OB UMMC

## 2017-05-19 PROCEDURE — 25000128 H RX IP 250 OP 636: Performed by: ANESTHESIOLOGY

## 2017-05-19 PROCEDURE — 25000125 ZZHC RX 250: Performed by: ANESTHESIOLOGY

## 2017-05-19 PROCEDURE — 88341 IMHCHEM/IMCYTCHM EA ADD ANTB: CPT | Mod: 26 | Performed by: ORTHOPAEDIC SURGERY

## 2017-05-19 PROCEDURE — 71000015 ZZH RECOVERY PHASE 1 LEVEL 2 EA ADDTL HR: Performed by: ORTHOPAEDIC SURGERY

## 2017-05-19 PROCEDURE — 0PHF04Z INSERTION OF INTERNAL FIXATION DEVICE INTO RIGHT HUMERAL SHAFT, OPEN APPROACH: ICD-10-PCS | Performed by: ORTHOPAEDIC SURGERY

## 2017-05-19 PROCEDURE — 27210995 ZZH RX 272: Performed by: ORTHOPAEDIC SURGERY

## 2017-05-19 PROCEDURE — 85730 THROMBOPLASTIN TIME PARTIAL: CPT | Performed by: NURSE PRACTITIONER

## 2017-05-19 DEVICE — IMP SCR SYN CORTEX 4.5X30MM SELF TAP SS 214.830: Type: IMPLANTABLE DEVICE | Site: HUMERUS | Status: FUNCTIONAL

## 2017-05-19 DEVICE — BONE CEMENT SIMPLEX FULL DOSE 6191-1-001: Type: IMPLANTABLE DEVICE | Site: HUMERUS | Status: FUNCTIONAL

## 2017-05-19 DEVICE — IMP SCR SYN CORTEX 4.5X28MM SELF TAP SS 214.828: Type: IMPLANTABLE DEVICE | Site: HUMERUS | Status: FUNCTIONAL

## 2017-05-19 DEVICE — IMPLANTABLE DEVICE: Type: IMPLANTABLE DEVICE | Site: HUMERUS | Status: FUNCTIONAL

## 2017-05-19 DEVICE — IMP SCR SYN 5.0X30MM LOCKING W/STARDRIVE SS 212.209: Type: IMPLANTABLE DEVICE | Site: HUMERUS | Status: FUNCTIONAL

## 2017-05-19 RX ORDER — KETOROLAC TROMETHAMINE 30 MG/ML
30 INJECTION, SOLUTION INTRAMUSCULAR; INTRAVENOUS EVERY 6 HOURS
Status: DISCONTINUED | OUTPATIENT
Start: 2017-05-19 | End: 2017-05-20 | Stop reason: HOSPADM

## 2017-05-19 RX ORDER — ONDANSETRON 2 MG/ML
4 INJECTION INTRAMUSCULAR; INTRAVENOUS EVERY 6 HOURS PRN
Status: DISCONTINUED | OUTPATIENT
Start: 2017-05-19 | End: 2017-05-20 | Stop reason: HOSPADM

## 2017-05-19 RX ORDER — DEXAMETHASONE SODIUM PHOSPHATE 4 MG/ML
INJECTION, SOLUTION INTRA-ARTICULAR; INTRALESIONAL; INTRAMUSCULAR; INTRAVENOUS; SOFT TISSUE PRN
Status: DISCONTINUED | OUTPATIENT
Start: 2017-05-19 | End: 2017-05-19

## 2017-05-19 RX ORDER — HYDROXYZINE HYDROCHLORIDE 25 MG/1
25 TABLET, FILM COATED ORAL EVERY 6 HOURS PRN
Status: DISCONTINUED | OUTPATIENT
Start: 2017-05-19 | End: 2017-05-20 | Stop reason: HOSPADM

## 2017-05-19 RX ORDER — HYDROMORPHONE HYDROCHLORIDE 1 MG/ML
.3-.5 INJECTION, SOLUTION INTRAMUSCULAR; INTRAVENOUS; SUBCUTANEOUS EVERY 10 MIN PRN
Status: DISCONTINUED | OUTPATIENT
Start: 2017-05-19 | End: 2017-05-19

## 2017-05-19 RX ORDER — SODIUM CHLORIDE 9 MG/ML
INJECTION, SOLUTION INTRAVENOUS CONTINUOUS
Status: DISCONTINUED | OUTPATIENT
Start: 2017-05-19 | End: 2017-05-19

## 2017-05-19 RX ORDER — MAGNESIUM HYDROXIDE 1200 MG/15ML
LIQUID ORAL PRN
Status: DISCONTINUED | OUTPATIENT
Start: 2017-05-19 | End: 2017-05-19

## 2017-05-19 RX ORDER — NALOXONE HYDROCHLORIDE 0.4 MG/ML
.1-.4 INJECTION, SOLUTION INTRAMUSCULAR; INTRAVENOUS; SUBCUTANEOUS
Status: DISCONTINUED | OUTPATIENT
Start: 2017-05-19 | End: 2017-05-20 | Stop reason: HOSPADM

## 2017-05-19 RX ORDER — FENTANYL CITRATE 50 UG/ML
25-50 INJECTION, SOLUTION INTRAMUSCULAR; INTRAVENOUS EVERY 5 MIN PRN
Status: DISCONTINUED | OUTPATIENT
Start: 2017-05-19 | End: 2017-05-19

## 2017-05-19 RX ORDER — LIDOCAINE 40 MG/G
CREAM TOPICAL
Status: DISCONTINUED | OUTPATIENT
Start: 2017-05-19 | End: 2017-05-19 | Stop reason: HOSPADM

## 2017-05-19 RX ORDER — NALOXONE HYDROCHLORIDE 0.4 MG/ML
.1-.4 INJECTION, SOLUTION INTRAMUSCULAR; INTRAVENOUS; SUBCUTANEOUS
Status: DISCONTINUED | OUTPATIENT
Start: 2017-05-19 | End: 2017-05-19 | Stop reason: HOSPADM

## 2017-05-19 RX ORDER — SODIUM CHLORIDE, SODIUM LACTATE, POTASSIUM CHLORIDE, CALCIUM CHLORIDE 600; 310; 30; 20 MG/100ML; MG/100ML; MG/100ML; MG/100ML
INJECTION, SOLUTION INTRAVENOUS CONTINUOUS
Status: DISCONTINUED | OUTPATIENT
Start: 2017-05-19 | End: 2017-05-19

## 2017-05-19 RX ORDER — BUPIVACAINE HYDROCHLORIDE AND EPINEPHRINE 2.5; 5 MG/ML; UG/ML
INJECTION, SOLUTION INFILTRATION; PERINEURAL PRN
Status: DISCONTINUED | OUTPATIENT
Start: 2017-05-19 | End: 2017-05-19

## 2017-05-19 RX ORDER — ONDANSETRON 2 MG/ML
4 INJECTION INTRAMUSCULAR; INTRAVENOUS EVERY 30 MIN PRN
Status: DISCONTINUED | OUTPATIENT
Start: 2017-05-19 | End: 2017-05-19

## 2017-05-19 RX ORDER — LIDOCAINE 40 MG/G
CREAM TOPICAL
Status: DISCONTINUED | OUTPATIENT
Start: 2017-05-19 | End: 2017-05-20 | Stop reason: HOSPADM

## 2017-05-19 RX ORDER — FLUMAZENIL 0.1 MG/ML
0.2 INJECTION, SOLUTION INTRAVENOUS
Status: DISCONTINUED | OUTPATIENT
Start: 2017-05-19 | End: 2017-05-19 | Stop reason: HOSPADM

## 2017-05-19 RX ORDER — GLYCOPYRROLATE 0.2 MG/ML
INJECTION, SOLUTION INTRAMUSCULAR; INTRAVENOUS PRN
Status: DISCONTINUED | OUTPATIENT
Start: 2017-05-19 | End: 2017-05-19

## 2017-05-19 RX ORDER — KETAMINE HYDROCHLORIDE 10 MG/ML
INJECTION, SOLUTION INTRAMUSCULAR; INTRAVENOUS PRN
Status: DISCONTINUED | OUTPATIENT
Start: 2017-05-19 | End: 2017-05-19

## 2017-05-19 RX ORDER — CLINDAMYCIN PHOSPHATE 900 MG/50ML
900 INJECTION, SOLUTION INTRAVENOUS
Status: COMPLETED | OUTPATIENT
Start: 2017-05-19 | End: 2017-05-19

## 2017-05-19 RX ORDER — SODIUM CHLORIDE, SODIUM LACTATE, POTASSIUM CHLORIDE, CALCIUM CHLORIDE 600; 310; 30; 20 MG/100ML; MG/100ML; MG/100ML; MG/100ML
INJECTION, SOLUTION INTRAVENOUS CONTINUOUS
Status: DISCONTINUED | OUTPATIENT
Start: 2017-05-19 | End: 2017-05-19 | Stop reason: HOSPADM

## 2017-05-19 RX ORDER — OXYCODONE HYDROCHLORIDE 5 MG/1
5-10 TABLET ORAL
Status: DISCONTINUED | OUTPATIENT
Start: 2017-05-19 | End: 2017-05-20 | Stop reason: HOSPADM

## 2017-05-19 RX ORDER — FENTANYL CITRATE 50 UG/ML
INJECTION, SOLUTION INTRAMUSCULAR; INTRAVENOUS PRN
Status: DISCONTINUED | OUTPATIENT
Start: 2017-05-19 | End: 2017-05-19

## 2017-05-19 RX ORDER — OXYCODONE HYDROCHLORIDE 5 MG/1
5-10 TABLET ORAL
Qty: 60 TABLET | Refills: 0 | Status: SHIPPED | OUTPATIENT
Start: 2017-05-19 | End: 2017-06-16

## 2017-05-19 RX ORDER — AMOXICILLIN 250 MG
1-2 CAPSULE ORAL 2 TIMES DAILY
Status: DISCONTINUED | OUTPATIENT
Start: 2017-05-19 | End: 2017-05-20 | Stop reason: HOSPADM

## 2017-05-19 RX ORDER — ONDANSETRON 4 MG/1
4 TABLET, ORALLY DISINTEGRATING ORAL EVERY 6 HOURS PRN
Status: DISCONTINUED | OUTPATIENT
Start: 2017-05-19 | End: 2017-05-20 | Stop reason: HOSPADM

## 2017-05-19 RX ORDER — GABAPENTIN 100 MG/1
300 CAPSULE ORAL ONCE
Status: COMPLETED | OUTPATIENT
Start: 2017-05-19 | End: 2017-05-19

## 2017-05-19 RX ORDER — SODIUM CHLORIDE 9 MG/ML
INJECTION, SOLUTION INTRAVENOUS CONTINUOUS
Status: ACTIVE | OUTPATIENT
Start: 2017-05-19 | End: 2017-05-20

## 2017-05-19 RX ORDER — ZOLEDRONIC ACID 4 MG/5ML
INJECTION INTRAVENOUS PRN
Status: DISCONTINUED | OUTPATIENT
Start: 2017-05-19 | End: 2017-05-19

## 2017-05-19 RX ORDER — MEPERIDINE HYDROCHLORIDE 25 MG/ML
12.5 INJECTION INTRAMUSCULAR; INTRAVENOUS; SUBCUTANEOUS
Status: DISCONTINUED | OUTPATIENT
Start: 2017-05-19 | End: 2017-05-19

## 2017-05-19 RX ORDER — SODIUM CHLORIDE, SODIUM LACTATE, POTASSIUM CHLORIDE, CALCIUM CHLORIDE 600; 310; 30; 20 MG/100ML; MG/100ML; MG/100ML; MG/100ML
INJECTION, SOLUTION INTRAVENOUS CONTINUOUS PRN
Status: DISCONTINUED | OUTPATIENT
Start: 2017-05-19 | End: 2017-05-19

## 2017-05-19 RX ORDER — LORATADINE 10 MG/1
10 TABLET ORAL DAILY PRN
Status: DISCONTINUED | OUTPATIENT
Start: 2017-05-19 | End: 2017-05-20 | Stop reason: HOSPADM

## 2017-05-19 RX ORDER — ACETAMINOPHEN 325 MG/1
650 TABLET ORAL EVERY 4 HOURS PRN
Status: DISCONTINUED | OUTPATIENT
Start: 2017-05-22 | End: 2017-05-20 | Stop reason: HOSPADM

## 2017-05-19 RX ORDER — ONDANSETRON 2 MG/ML
INJECTION INTRAMUSCULAR; INTRAVENOUS PRN
Status: DISCONTINUED | OUTPATIENT
Start: 2017-05-19 | End: 2017-05-19

## 2017-05-19 RX ORDER — NEOSTIGMINE METHYLSULFATE 1 MG/ML
VIAL (ML) INJECTION PRN
Status: DISCONTINUED | OUTPATIENT
Start: 2017-05-19 | End: 2017-05-19

## 2017-05-19 RX ORDER — NALOXONE HYDROCHLORIDE 0.4 MG/ML
.1-.4 INJECTION, SOLUTION INTRAMUSCULAR; INTRAVENOUS; SUBCUTANEOUS
Status: DISCONTINUED | OUTPATIENT
Start: 2017-05-19 | End: 2017-05-19

## 2017-05-19 RX ORDER — ONDANSETRON 4 MG/1
4 TABLET, ORALLY DISINTEGRATING ORAL EVERY 30 MIN PRN
Status: DISCONTINUED | OUTPATIENT
Start: 2017-05-19 | End: 2017-05-19

## 2017-05-19 RX ORDER — ACETAMINOPHEN 325 MG/1
975 TABLET ORAL ONCE
Status: COMPLETED | OUTPATIENT
Start: 2017-05-19 | End: 2017-05-19

## 2017-05-19 RX ORDER — ACETAMINOPHEN 325 MG/1
975 TABLET ORAL EVERY 8 HOURS
Status: DISCONTINUED | OUTPATIENT
Start: 2017-05-19 | End: 2017-05-20 | Stop reason: HOSPADM

## 2017-05-19 RX ORDER — FENTANYL CITRATE 50 UG/ML
25-50 INJECTION, SOLUTION INTRAMUSCULAR; INTRAVENOUS
Status: DISCONTINUED | OUTPATIENT
Start: 2017-05-19 | End: 2017-05-19 | Stop reason: HOSPADM

## 2017-05-19 RX ORDER — PROPOFOL 10 MG/ML
INJECTION, EMULSION INTRAVENOUS PRN
Status: DISCONTINUED | OUTPATIENT
Start: 2017-05-19 | End: 2017-05-19

## 2017-05-19 RX ORDER — LIDOCAINE HYDROCHLORIDE 20 MG/ML
INJECTION, SOLUTION INFILTRATION; PERINEURAL PRN
Status: DISCONTINUED | OUTPATIENT
Start: 2017-05-19 | End: 2017-05-19

## 2017-05-19 RX ORDER — CLINDAMYCIN PHOSPHATE 900 MG/50ML
900 INJECTION, SOLUTION INTRAVENOUS EVERY 8 HOURS
Status: COMPLETED | OUTPATIENT
Start: 2017-05-19 | End: 2017-05-20

## 2017-05-19 RX ADMIN — OXYCODONE HYDROCHLORIDE 10 MG: 5 TABLET ORAL at 18:46

## 2017-05-19 RX ADMIN — ACETAMINOPHEN 975 MG: 325 TABLET, FILM COATED ORAL at 08:30

## 2017-05-19 RX ADMIN — PROCHLORPERAZINE EDISYLATE 10 MG: 5 INJECTION INTRAMUSCULAR; INTRAVENOUS at 15:45

## 2017-05-19 RX ADMIN — FENTANYL CITRATE 50 MCG: 50 INJECTION, SOLUTION INTRAMUSCULAR; INTRAVENOUS at 10:18

## 2017-05-19 RX ADMIN — PHENYLEPHRINE HYDROCHLORIDE 100 MCG: 10 INJECTION, SOLUTION INTRAMUSCULAR; INTRAVENOUS; SUBCUTANEOUS at 12:46

## 2017-05-19 RX ADMIN — SODIUM CHLORIDE, POTASSIUM CHLORIDE, SODIUM LACTATE AND CALCIUM CHLORIDE: 600; 310; 30; 20 INJECTION, SOLUTION INTRAVENOUS at 12:27

## 2017-05-19 RX ADMIN — MIDAZOLAM HYDROCHLORIDE 2 MG: 1 INJECTION, SOLUTION INTRAMUSCULAR; INTRAVENOUS at 10:18

## 2017-05-19 RX ADMIN — GABAPENTIN 300 MG: 300 CAPSULE ORAL at 08:30

## 2017-05-19 RX ADMIN — KETOROLAC TROMETHAMINE 30 MG: 30 INJECTION, SOLUTION INTRAMUSCULAR at 17:23

## 2017-05-19 RX ADMIN — KETAMINE HCL-NACL SOLN PREF SY 50 MG/5ML-0.9% (10MG/ML) 20 MG: 10 SOLUTION PREFILLED SYRINGE at 12:04

## 2017-05-19 RX ADMIN — CLINDAMYCIN PHOSPHATE 900 MG: 18 INJECTION, SOLUTION INTRAVENOUS at 20:30

## 2017-05-19 RX ADMIN — PROPOFOL 50 MG: 10 INJECTION, EMULSION INTRAVENOUS at 13:28

## 2017-05-19 RX ADMIN — DEXAMETHASONE SODIUM PHOSPHATE 4 MG: 4 INJECTION, SOLUTION INTRAMUSCULAR; INTRAVENOUS at 10:56

## 2017-05-19 RX ADMIN — HYDROMORPHONE HYDROCHLORIDE 0.2 MG: 1 INJECTION, SOLUTION INTRAMUSCULAR; INTRAVENOUS; SUBCUTANEOUS at 15:28

## 2017-05-19 RX ADMIN — PHENYLEPHRINE HYDROCHLORIDE 100 MCG: 10 INJECTION, SOLUTION INTRAMUSCULAR; INTRAVENOUS; SUBCUTANEOUS at 11:22

## 2017-05-19 RX ADMIN — Medication 50 MG: at 10:27

## 2017-05-19 RX ADMIN — SODIUM CHLORIDE: 9 INJECTION, SOLUTION INTRAVENOUS at 18:46

## 2017-05-19 RX ADMIN — FENTANYL CITRATE 50 MCG: 50 INJECTION, SOLUTION INTRAMUSCULAR; INTRAVENOUS at 13:57

## 2017-05-19 RX ADMIN — SODIUM CHLORIDE, POTASSIUM CHLORIDE, SODIUM LACTATE AND CALCIUM CHLORIDE: 600; 310; 30; 20 INJECTION, SOLUTION INTRAVENOUS at 11:07

## 2017-05-19 RX ADMIN — KETOROLAC TROMETHAMINE 30 MG: 30 INJECTION, SOLUTION INTRAMUSCULAR at 23:10

## 2017-05-19 RX ADMIN — LIDOCAINE HYDROCHLORIDE 100 MG: 20 INJECTION, SOLUTION INFILTRATION; PERINEURAL at 10:27

## 2017-05-19 RX ADMIN — CLINDAMYCIN PHOSPHATE 900 MG: 18 INJECTION, SOLUTION INTRAVENOUS at 11:43

## 2017-05-19 RX ADMIN — SENNOSIDES AND DOCUSATE SODIUM 2 TABLET: 8.6; 5 TABLET ORAL at 20:30

## 2017-05-19 RX ADMIN — FENTANYL CITRATE 50 MCG: 50 INJECTION, SOLUTION INTRAMUSCULAR; INTRAVENOUS at 10:27

## 2017-05-19 RX ADMIN — PHENYLEPHRINE HYDROCHLORIDE 100 MCG: 10 INJECTION, SOLUTION INTRAMUSCULAR; INTRAVENOUS; SUBCUTANEOUS at 12:05

## 2017-05-19 RX ADMIN — FENTANYL CITRATE 50 MCG: 50 INJECTION, SOLUTION INTRAMUSCULAR; INTRAVENOUS at 11:49

## 2017-05-19 RX ADMIN — HYDROMORPHONE HYDROCHLORIDE 0.5 MG: 1 INJECTION, SOLUTION INTRAMUSCULAR; INTRAVENOUS; SUBCUTANEOUS at 14:35

## 2017-05-19 RX ADMIN — KETAMINE HCL-NACL SOLN PREF SY 50 MG/5ML-0.9% (10MG/ML) 10 MG: 10 SOLUTION PREFILLED SYRINGE at 12:56

## 2017-05-19 RX ADMIN — ACETAMINOPHEN 975 MG: 325 TABLET, FILM COATED ORAL at 17:24

## 2017-05-19 RX ADMIN — FENTANYL CITRATE 50 MCG: 50 INJECTION, SOLUTION INTRAMUSCULAR; INTRAVENOUS at 13:53

## 2017-05-19 RX ADMIN — HYDROXYZINE HYDROCHLORIDE 25 MG: 25 TABLET ORAL at 22:04

## 2017-05-19 RX ADMIN — ONDANSETRON 4 MG: 2 INJECTION INTRAMUSCULAR; INTRAVENOUS at 14:22

## 2017-05-19 RX ADMIN — NEOSTIGMINE METHYLSULFATE 2 MG: 1 INJECTION INTRAMUSCULAR; INTRAVENOUS; SUBCUTANEOUS at 13:08

## 2017-05-19 RX ADMIN — FENTANYL CITRATE 50 MCG: 50 INJECTION, SOLUTION INTRAMUSCULAR; INTRAVENOUS at 13:42

## 2017-05-19 RX ADMIN — HYDROMORPHONE HYDROCHLORIDE 0.5 MG: 1 INJECTION, SOLUTION INTRAMUSCULAR; INTRAVENOUS; SUBCUTANEOUS at 14:02

## 2017-05-19 RX ADMIN — GLYCOPYRROLATE 0.2 MG: 0.2 INJECTION, SOLUTION INTRAMUSCULAR; INTRAVENOUS at 13:08

## 2017-05-19 RX ADMIN — Medication 1 TABLET: at 15:07

## 2017-05-19 RX ADMIN — ONDANSETRON 4 MG: 2 INJECTION INTRAMUSCULAR; INTRAVENOUS at 13:07

## 2017-05-19 RX ADMIN — SODIUM CHLORIDE, POTASSIUM CHLORIDE, SODIUM LACTATE AND CALCIUM CHLORIDE: 600; 310; 30; 20 INJECTION, SOLUTION INTRAVENOUS at 10:18

## 2017-05-19 RX ADMIN — PROPOFOL 150 MG: 10 INJECTION, EMULSION INTRAVENOUS at 10:27

## 2017-05-19 RX ADMIN — OXYCODONE HYDROCHLORIDE 10 MG: 5 TABLET ORAL at 22:02

## 2017-05-19 NOTE — IP AVS SNAPSHOT
[facility]    [address] [place] [address]    Phone:  [phone]                                       After Visit Summary   5/19/2017    Vince Krueger    MRN: [id number]           After Visit Summary Signature Page     I have received my discharge instructions, and my questions have been answered. I have discussed any challenges I see with this plan with the nurse or doctor.    ..........................................................................................................................................  Patient/Patient Representative Signature      ..........................................................................................................................................  Patient Representative Print Name and Relationship to Patient    ..................................................               ................................................  Date                                            Time    ..........................................................................................................................................  Reviewed by Signature/Title    ...................................................              ..............................................  Date                                                            Time

## 2017-05-19 NOTE — IP AVS SNAPSHOT
MRN:[id number]                      After Visit Summary   5/19/2017    Vince Krueger    MRN: [id number]           Thank you!     Thank you for choosing [facility] for your care. Our goal is always to provide you with excellent care. Hearing back from our patients is one way we can continue to improve our services. Please take a few minutes to complete the written survey that you may receive in the mail after you visit with us. Thank you!        Patient Information     Date Of Birth          1975        Designated Caregiver       Most Recent Value    Caregiver    Will someone help with your care after discharge? yes    Name of designated caregiver Kemi    Phone number of caregiver With pt    Caregiver address With pt      About your hospital stay     You were admitted on:  May 19, 2017 You last received care in the:  [facility]    You were discharged on:  May 20, 2017        Reason for your hospital stay       Excision bone tumor right humerus                  Who to Call     For medical emergencies, please call 911.  For non-urgent questions about your medical care, please call your primary care provider or clinic, [phone]  For questions related to your surgery, please call your surgery clinic        Attending Provider     Provider Specialty    John Hudson MD Orthopedics       Primary Care Provider Office Phone # Fax #    Tanya MIGUEL ÁNGEL Wen [phone]       [facility] [address] [place]  [place] [facility] [address]        After Care Instructions     Discharge Instructions       SAME DAY SURGERY DISCHARGE INSTRUCTIONS:    Discharge disposition: Discharge to home     Diet: Advance to a regular diet as tolerated     Activity: Activity as tolerated     Follow-up: 2 weeks with provider    For 24 hours after surgery:  1. Get plenty of rest. A responsible adult must stay with you for at least 24 hours after you leave the hospital.  2. Do not drive or use heavy equipment. If you  have weakness or tingling, don't drive or use heavy equipment until this feeling goes away.  3. Do not drink alcohol.  4. Avoid strenuous or risky activities. Ask for help when climbing stairs.  5. You may feel lightheaded. If so, sit for a few minutes before standing. Have someone help you get up.   6. If you have nausea (feel sick to your stomach), drink only clear liquids such as apple juice, ginger ale, broth, or 7-UP. Rest may also help. Be sure to drink enough fluids. Move to a regular diet as you feel able.   7. You may have a slight fever. Call your doctor if your fever is over 100.4 F (38 C) (taken under the tongue) or lasts longer than 24 hours.   8. You may have a dry mouth, sore throat, muscle aches, or trouble sleeping. These should go away after 24 hours.   9. Do not make important or or legal decisions.     Call your doctor for any of the followin. Signs of infection such as fever (temperature >100.4 F or 38 C), growing tenderness at the surgery site, a large amount of drainage or bleeding, severe pain, foul-smelling drainage, redness, and/or swelling.  2. It has been over 8-10 hours since surgery, and you still have not urinated (or passsed water).   3. Headache for over 24 hours.   4. Numbness, tingling, or weakness the day after surgery (if you had spinal anesthesia).     To contact your doctor call [phone] and ask for the Orthopaedics resident on call. Alternatively, you may call the Emergency Department at the [facility] - [phone] - or [place] - [phone] - [facility].    Review outpatient procedure discharge instructions as directed by MD. After discharge, If the patient has additional questions, please contact our nurse triage coordinators during normal business hours, 8 am - 5 pm, at [phone], option 3. After normal business hours, please contact the Orthopaedics resident on call at [phone], option 4.            Discharge Instructions - Lifting Limit  (specify)       Lifting limit  5 pounds until seen at Post-op follow up appointment.            Ice to affected area       Ice pack to surgical site every 15-30 minutes per hour for 24 hours as desired to minimize swelling and pain.            No Alcohol       While using narcotic medication            No driving or operating machinery       While using narcotic medication            Shower       Patient may begin taking showers on the third day after surgery as long as the incision is dry. If there is drainage from the incision, cover the site with gauze and tape (3M brand Micropore tape is preferable) and keep it dry while showering. If there is not drainage, getting the wound wet while showering is acceptable. To dry, gently pat the incision dry and reapply dressing as per above instructions as needed.     For patients who underwent surgery on the upper extremity or below the knee, a bath can be taken at any time as long as the surgical site is kept out of the water.            Wound care       Leave the initial bandage (dressing) on for 72 hours (3 days) as long as the bandage is dry and intact.  If the incision is leaking any fluid or blood through or around the bandage, remove the existing bandage and replace it (gauze and tape). Wash hands before and after, and use gloves if available. A bandage is only necessary for 3-4 days but you may keep one on as long as you desire for either comfort (due to irritation from overlying clothing) or for cosmetic reasons.    Sutures (Stitches) will dissolve, however, the exposed suture (looks like a clear fishing line) at each end of the incision should be clipped flush with the skin using clean scissors or a nail clipper anytime after the third day after surgery. If desired, this can be done by clinic personnel at your follow up office visit instead.                  Your next 10 appointments already scheduled     May 26, 2017  4:00 PM CDT   RETURN ONC with Andre Shah  MD Amarilys   [facility] ([facility])    [address]  [address]  [place] [address] [phone]              Further instructions from your care team       Same-Day Surgery   Adult Discharge Orders & Instructions     For 24 hours after surgery:  1. Get plenty of rest.  A responsible adult must stay with you for at least 24 hours after you leave the hospital.   2. Pain medication can slow your reflexes. Do not drive or use heavy equipment.  If you have weakness or tingling, don't drive or use heavy equipment until this feeling goes away.  3. Mixing alcohol and pain medication can cause dizziness and slow your breathing. It can even be fatal. Do not drink alcohol while taking pain medication.  4. Avoid strenuous or risky activities.  Ask for help when climbing stairs.   5. You may feel lightheaded.  If so, sit for a few minutes before standing.  Have someone help you get up.   6. If you have nausea (feel sick to your stomach), drink only clear liquids such as apple juice, ginger ale, broth or 7-Up.  Rest may also help.  Be sure to drink enough fluids.  Move to a regular diet as you feel able. Take pain medications with a small amount of solid food, such as toast or crackers, to avoid nausea.   7. A slight fever is normal. Call the doctor if your fever is over 100 F (37.7 C) (taken under the tongue) or lasts longer than 24 hours.  8. You may have a dry mouth, muscle aches, trouble sleeping or a sore throat. These symptoms should go away after 24 hours.  9. Do not make important or legal decisions.   Pain Management:      1. Take pain medication (if prescribed) for pain as directed by your physician.        2. WARNING: If the pain medication you have been prescribed contains Tylenol (acetaminophen), DO NOT take additional doses of Tylenol (acetaminophen).     Call your doctor for any of the followin.  Signs of infection (fever, growing tenderness  at the surgery site, severe pain, a large amount of drainage or bleeding, foul-smelling drainage, redness, swelling).    2.  It has been over 8 to 10 hours since surgery and you are still not able to urinate (pee).    3.  Headache for over 24 hours.      To contact a doctor, call Dr. MATTHIEU Hudson's clinic # [phone] or:      [phone] and ask for the Resident On Call for: Orthopaedic Resident (answered 24 hours a day)      Emergency Department:  [facility] [facility]: [phone]  [place] [facility]: [phone]               Rev. 10/2014            Tips for taking pain medications  To get the best pain relief possible , remember these points:      Take pain medications as directed, before pain becomes severe      Pain medication can upset your stomach: taking it with food may help      Constipation is a common side effect of pain medication. Drink plenty of  Fluids      Eat foods high in fiber. Take a stool softener  if recommended by your doctor or  Pharmacist.        Do not drink alcohol, drive or operate machinery while taking pain medications.      Ask about other ways to control pain, such as with heat, ice or relaxation.        Pending Results     Date and Time Order Name Status Description    5/19/2017 1144 Surgical pathology exam In process             Statement of Approval     Ordered          05/20/17 1000  I have reviewed and agree with all the recommendations and orders detailed in this document.  EFFECTIVE NOW     Approved and electronically signed by:  John Hudson MD             Admission Information     Date & Time Provider Department Dept. Phone    5/19/2017 John Hudson MD [facility] [phone]      Your Vitals Were     Blood Pressure Temperature Respirations Height Weight Pulse Oximetry    114/65 (BP Location: Left arm) 97  F (36.1  C) (Oral) 16 1.829 m (6') 75.6 kg (166 lb 10.7 oz) 98%    BMI (Body Mass Index)                   22.6 kg/m2           MyChart  "Information     [facility] lets you send messages to your doctor, view your test results, renew your prescriptions, schedule appointments and more. To sign up, go to [email or web address] . Click on \"Log in\" on the left side of the screen, which will take you to the Welcome page. Then click on \"Sign up Now\" on the right side of the page.     You will be asked to enter the access code listed below, as well as some personal information. Please follow the directions to create your username and password.     Your access code is: [email or web address]  Expires: 2017  6:30 AM     Your access code will  in 90 days. If you need help or a new code, please call your [facility] clinic or [phone].        Care EveryWhere ID     This is your Care EveryWhere ID. This could be used by other organizations to access your [facility] medical records  [id number]           Review of your medicines      START taking        Dose / Directions    oxyCODONE 5 MG IR tablet   Commonly known as:  ROXICODONE   Used for:  Lytic lesion of bone on x-ray        Dose:  5-10 mg   Take 1-2 tablets (5-10 mg) by mouth every 3 hours as needed for pain or other (Moderate to Severe)   Quantity:  60 tablet   Refills:  0         CONTINUE these medicines which have NOT CHANGED        Dose / Directions    loratadine 10 MG tablet   Commonly known as:  CLARITIN   Used for:  Lesion of right humerus        Dose:  10 mg   Take 10 mg by mouth as needed   Refills:  0       MULTIVITAMIN ADULT Chew        Take by mouth daily   Refills:  0            Where to get your medicines      Some of these will need a paper prescription and others can be bought over the counter. Ask your nurse if you have questions.     Bring a paper prescription for each of these medications     oxyCODONE 5 MG IR tablet                Protect others around you: Learn how to safely use, store and throw away your medicines at www.disposemymeds.org.             Medication List: This is a list of " all your medications and when to take them. Check marks below indicate your daily home schedule. Keep this list as a reference.      Medications           Morning Afternoon Evening Bedtime As Needed    loratadine 10 MG tablet   Commonly known as:  CLARITIN   Take 10 mg by mouth as needed                                MULTIVITAMIN ADULT Chew   Take by mouth daily                                oxyCODONE 5 MG IR tablet   Commonly known as:  ROXICODONE   Take 1-2 tablets (5-10 mg) by mouth every 3 hours as needed for pain or other (Moderate to Severe)   Last time this was given:  10 mg on 5/20/2017  8:31 AM

## 2017-05-19 NOTE — ANESTHESIA CARE TRANSFER NOTE
Patient: Vince Krueger    Procedure(s):  Biopsy, Curettage and Internal Fixation Right Humerus, and Supplemental Fixation with Zometa-Impregnated Cement - Wound Class: I-Clean   - Wound Class: I-Clean    Diagnosis: Lesion Right Humerus  Diagnosis Additional Information: No value filed.    Anesthesia Type:   MAC, Periph. Nerve Block for postop pain     Note:  Airway :Face Mask  Patient transferred to:PACU  Comments: Arrived in PACU, report to RN, vitals stable, patient comfortable.        Vitals: (Last set prior to Anesthesia Care Transfer)    CRNA VITALS  5/19/2017 1309 - 5/19/2017 1343      5/19/2017             Resp Rate (set): 10                Electronically Signed By: ZANA Hare CRNA  May 19, 2017  1:43 PM

## 2017-05-19 NOTE — CONSULTS
HOSPITALIST CONSULTATION     REQUESTING PHYSICIAN: John Hudson MD    REASON FOR CONSULTATION: Evaluation and Recommendations of Medical Co morbidities.     ASSESSMENT & PLAN :     Vince Krueger is a 41 year old male admitted on 5/19/2017 s/p following procedure:     Preop Dx:   Bone lesion R humerus   Post op Dx:    Same  Procedure:    Procedure(s):  Biopsy, Curettage and Internal Fixation Right Humerus, and Supplemental Fixation with Zometa-Impregnated Cement - Wound Class: I-Clean  - Wound Class: I-Clean  Surgeon:    MD Tristan  Assistants:    Ivan  Anesthesia:   GETA  EBL:    200cc  Total IV Fluids:  (See anesthesia record)  Blood transfusion  No transfusion was given during surgery  Specimens:   Tumor to path      Currently doing well  Pre op eval. PMH reviewed.   No significant medical problems, otherwise healthy patient.       ENT/Pulmonary:     (+)tobacco use (5 pack years. Quit both >15 years ago.), Past use 1 ppd packs/day , . .    Neurologic:  - neg neurologic ROS     Cardiovascular:     (+) ----. : . . . :. Irregular Heartbeat/Palpitations (Evaluated in 2014 by cardiology>>>[facility] . Previous cardiac testing Echodate:results:date: results: date: results: date: results:     METS/Exercise Tolerance:  >4 METS   Hematologic:  - neg hematologic ROS     Musculoskeletal:   (+) , , other musculoskeletal (Cervical neck pain.)-     GI/Hepatic:     (+) Asymptomatic on medication, hiatal hernia (s/p nissen fundoplication three years ago at [facility]     Renal/Genitourinary:  - ROS Renal section negative     Endo:     (+) Other Endocrine Disorder recent episodes of hypoglycemia, evaluated by endrocrinolgy..    Psychiatric:  - neg psychiatric ROS     Infectious Disease:  - neg infectious disease ROS     Malignancy:   (+) Malignancy (Myloma>>>recent diagnosis.) History of Other  Other CA Active status post        Other: Comment: Intermittent pain in right elbow>>>takes  Advil PRN.   (+) No chance of pregnancy C-spine cleared: N/A, no H/O Chronic Pain,no other significant disability           # CVS: h/o Palpitations - evaluated by cardiology 2014 with Holter Monitor and Echo. Holter monitor demonstrated: sinus rhythm with rare premature atrial contraction or premature ventricular contraction; no sustained ventricular or supraventricular dysrhythmias. Normal stress echocardiogram (2014) with no wall motion abnormality at stress, no ischemic changes, LVEF 65%.  - currently stable.   - monitor vitals.     # GI - Hiatal hernia, s/p nissen fundoplication, 2014>>> GERD symptoms resolved    # MSK: bone lesion R humerus; fu pathology report    # Orthopedics: POD 0    continue on IV fluids, until adequate PO.   Monitor hgb for anemia of acute blood loss. Transfuse for hgb <7.0    Analgesia: Per Orthopedic team.   DVT prophylaxis:- Per orthopedic team  Antibiotics and wound care as per primary team.   Activity per orthopedic team.   Encourage Incentive spirometry to prevent atelectasis   Consider bowel regimen while on narcotics.       Thank you for letting us get involved in care of Mr Krueger.  Please page with any questions.      Mitch Dickerson MD   Hospitalist (Internal Medicine)  Pager: [phone].     CHIEF COMPLAINT: Post op, doing well.     HISTORY OF PRESENT ILLNESS: Obtained from the patient and chart review.   41 year old year old male  with above discussed medical problems s/p above procedure admitted on 5/19/2017  for post op care and monitoring  (for further details for indication of surgery and operative note, please refer to John Hudson MD note). Medicine consulted to evaluate, recommend and/or co manage medical co morbidities.   No documented hypotension, hypoxemia or other significant complications intra or post operative.   Currently: Incisional Pain: controlled. Denies any chest pain, shortness of breath or LH or palpitations. Denies any nausea, vomiting or pain  abdomen. No fever or chills. Denies any dysuria.  Denies any new rash.   Denies any other medical concern.     PAST MEDICAL HISTORY:   Past Medical History:   Diagnosis Date     Allergic rhinitis due to allergen      GERD (gastroesophageal reflux disease)      Hiatal hernia      Neck mass     surgically excised 2011       PAST SURGICAL HISTORY:   Past Surgical History:   Procedure Laterality Date     NECK SURGERY       NISSEN FUNDOPLICATION  2014       FH: reviewed.     Family History   Problem Relation Age of Onset     Anxiety Disorder Brother      Unknown/Adopted Brother      Colon Cancer Brother      DIABETES Maternal Grandmother      Hyperlipidemia Maternal Grandmother         SH: reviewed.     Social History     Social History     Marital status:      Spouse name: Kemi     Number of children: 4     Years of education: N/A     Occupational History     supervisor      book keeper in [employer]     Social History Main Topics     Smoking status: Former Smoker     Packs/day: 1.00     Years: 5.00     Types: Cigarettes, Dip, chew, snus or snuff     Quit date: 9/1/1996     Smokeless tobacco: Former User     Alcohol use Yes      Comment: social     Drug use: No     Sexual activity: Yes     Partners: Female     Birth control/ protection: Female Surgical     Other Topics Concern     None     Social History Narrative       ALLERGIES:   Allergies   Allergen Reactions     No Clinical Screening - See Comments Hives     Palms and bottom of feet itching and hives     Penicillin G Hives     Swelling and turned red     Penicillins Hives, Rash and Swelling         HOME MEDICATIONS:     Prior to Admission medications    Medication Sig Start Date End Date Taking? Authorizing Provider   oxyCODONE (ROXICODONE) 5 MG IR tablet Take 1-2 tablets (5-10 mg) by mouth every 3 hours as needed for pain or other (Moderate to Severe) 5/19/17  Yes Oliver Love MD   Multiple Vitamins-Minerals (MULTIVITAMIN ADULT) CHEW Take by  "mouth daily   Yes Reported, Patient   loratadine (CLARITIN) 10 MG tablet Take 10 mg by mouth as needed     Reported, Patient       CURRENT MEDICATIONS:    Current Facility-Administered Medications   Medication     bupivacaine liposome (EXPAREL) LONG ACTING injection was administered into the infiltration site to produce postsurgical analgesia. Duration of action is up to 72 hours, and other \"eliel\" medications should not be given for 96 hours with the exception of the lidocaine 5% patch (LIDODERM) and the lidocaine 10mg in potassium infusions. This entry is for INFORMATION ONLY.     loratadine (CLARITIN) tablet 10 mg     lidocaine 1 % 1 mL     lidocaine (LMX4) kit     sodium chloride (PF) 0.9% PF flush 3 mL     sodium chloride (PF) 0.9% PF flush 3 mL     0.9% sodium chloride infusion     benzocaine-menthol (CHLORASEPTIC) 6-10 MG lozenge 1-2 lozenge     naloxone (NARCAN) injection 0.1-0.4 mg     clindamycin (CLEOCIN) infusion 900 mg     acetaminophen (TYLENOL) tablet 975 mg     [START ON 5/22/2017] acetaminophen (TYLENOL) tablet 650 mg     oxyCODONE (ROXICODONE) IR tablet 5-10 mg     ketorolac (TORADOL) injection 30 mg     hydrOXYzine (ATARAX) tablet 25 mg     ondansetron (ZOFRAN-ODT) ODT tab 4 mg    Or     ondansetron (ZOFRAN) injection 4 mg     senna-docusate (SENOKOT-S;PERICOLACE) 8.6-50 MG per tablet 1-2 tablet         ROS: 10 point ROS neg other than the symptoms noted above in the HPI.    PHYSICAL EXAMINATION:     /58  Temp 97.1  F (36.2  C) (Oral)  Resp 9  Ht 1.829 m (6')  Wt 75.6 kg (166 lb 10.7 oz)  SpO2 93%  BMI 22.6 kg/m2    BMI= Body mass index is 22.6 kg/(m^2).      Intake/Output Summary (Last 24 hours) at 05/19/17 1732  Last data filed at 05/19/17 1600   Gross per 24 hour   Intake             3290 ml   Output              500 ml   Net             2790 ml       General: Alert, interactive, NAD.   HEENT: AT/NC. PERRLA. Anicteric.Oral mucosa moist.    Neck: Supple. No JVD. No " Lymphadenopathy.  Heart/CV: Normal S1 and S2. Regular. No m/r/g .    Chest/Respi: Non labored breathing. CTA BL.   Abdomen/GI: Soft, non distended, non tender. No g/r/r.   Extremities/MSK: Distal pulses 2+, well perfused. Rest per ortho.   Neuro: Alert and oriented x4. Cranial nerves II-XII are grossly intact.    Skin:  No new rash over exposed areas.       LABORATORY DATA: reviewed.     Recent Results (from the past 24 hour(s))   INR    Collection Time: 05/19/17  8:05 AM   Result Value Ref Range    INR 1.03 0.86 - 1.14   Partial thromboplastin time    Collection Time: 05/19/17  8:05 AM   Result Value Ref Range    PTT 27 22 - 37 sec       Recent Results (from the past 24 hour(s))   XR Surgery ZAKIYA Fluoro L/T 5 Min    Narrative    This exam was marked as non-reportable because it will not be read by a   radiologist or a [facility] non-radiologist provider.                     Mitch Dickerson MD

## 2017-05-20 VITALS
DIASTOLIC BLOOD PRESSURE: 65 MMHG | TEMPERATURE: 97 F | WEIGHT: 166.67 LBS | RESPIRATION RATE: 16 BRPM | OXYGEN SATURATION: 98 % | BODY MASS INDEX: 22.57 KG/M2 | HEIGHT: 72 IN | SYSTOLIC BLOOD PRESSURE: 114 MMHG

## 2017-05-20 LAB — HGB BLD-MCNC: 11.6 G/DL (ref 13.3–17.7)

## 2017-05-20 PROCEDURE — 99231 SBSQ HOSP IP/OBS SF/LOW 25: CPT | Performed by: INTERNAL MEDICINE

## 2017-05-20 PROCEDURE — S0077 INJECTION, CLINDAMYCIN PHOSP: HCPCS | Performed by: ORTHOPAEDIC SURGERY

## 2017-05-20 PROCEDURE — 36415 COLL VENOUS BLD VENIPUNCTURE: CPT | Performed by: ORTHOPAEDIC SURGERY

## 2017-05-20 PROCEDURE — 25000132 ZZH RX MED GY IP 250 OP 250 PS 637: Performed by: ORTHOPAEDIC SURGERY

## 2017-05-20 PROCEDURE — 85018 HEMOGLOBIN: CPT | Performed by: ORTHOPAEDIC SURGERY

## 2017-05-20 PROCEDURE — 25000125 ZZHC RX 250: Performed by: ORTHOPAEDIC SURGERY

## 2017-05-20 RX ADMIN — OXYCODONE HYDROCHLORIDE 10 MG: 5 TABLET ORAL at 08:31

## 2017-05-20 RX ADMIN — SENNOSIDES AND DOCUSATE SODIUM 2 TABLET: 8.6; 5 TABLET ORAL at 08:31

## 2017-05-20 RX ADMIN — ACETAMINOPHEN 975 MG: 325 TABLET, FILM COATED ORAL at 08:31

## 2017-05-20 RX ADMIN — HYDROXYZINE HYDROCHLORIDE 25 MG: 25 TABLET ORAL at 09:49

## 2017-05-20 RX ADMIN — ACETAMINOPHEN 975 MG: 325 TABLET, FILM COATED ORAL at 03:32

## 2017-05-20 RX ADMIN — OXYCODONE HYDROCHLORIDE 10 MG: 5 TABLET ORAL at 11:20

## 2017-05-20 RX ADMIN — CLINDAMYCIN PHOSPHATE 900 MG: 18 INJECTION, SOLUTION INTRAVENOUS at 03:31

## 2017-05-20 RX ADMIN — OXYCODONE HYDROCHLORIDE 10 MG: 5 TABLET ORAL at 03:32

## 2017-05-20 ASSESSMENT — ACTIVITIES OF DAILY LIVING (ADL)
DRESS: 0-->INDEPENDENT
FALL_HISTORY_WITHIN_LAST_SIX_MONTHS: NO
RETIRED_COMMUNICATION: 0-->UNDERSTANDS/COMMUNICATES WITHOUT DIFFICULTY
AMBULATION: 0-->INDEPENDENT
RETIRED_EATING: 0-->INDEPENDENT
BATHING: 0-->INDEPENDENT
COGNITION: 0 - NO COGNITION ISSUES REPORTED
TOILETING: 0-->INDEPENDENT
TRANSFERRING: 0-->INDEPENDENT
SWALLOWING: 0-->SWALLOWS FOODS/LIQUIDS WITHOUT DIFFICULTY

## 2017-05-20 NOTE — PLAN OF CARE
Problem: Perioperative Period (Adult)  Goal: Signs and Symptoms of Listed Potential Problems Will be Absent or Manageable (Perioperative Period)  Signs and symptoms of listed potential problems will be absent or manageable by discharge/transition of care (reference Perioperative Period (Adult) CPG).  Outcome: No Change  Pt arrived to the unit at 1645 for PACU.  A&O x's 4. VSS. On continuous capnography. On RA and galina 94-97%. Lungs clear. Denies SOB and CP Bowel sounds hypoactive. LBM 5/18. Didn't pass gas yet. Voiding adequate amount to the urinal. Pt dangled and stood up. Pt was nauseous when getting up and felt better after returning to bed. Right shoulder incisional dressing CDI. Has sling for comfort. Ice applied. Pain well managed with with prn oxycodone, vistaril and schedule Toradol. CMS intact, denies N/T. PIV infusing NS at 125 ml/hr into left hand. Pt is resting comfortably between cares. Able to make needs know, call light in reach. Will continue to monitor.  Pt's HR was running little high to 114. Moonlighter paged and notified. Dr. Rodríguez instructed to increase IV fluid and ordered lab work to check HGB level.

## 2017-05-20 NOTE — DISCHARGE SUMMARY
ORTHOPAEDIC DISCHARGE SUMMARY     Date of Admission: 5/19/2017  Date of Discharge: 5/20/2017  Disposition: Home  Staff Physician: John Hudson MD  Primary Care Provider: Tanya Wen    DISCHARGE DIAGNOSIS:  Lesion Right Humerus.  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 Stayed overnight day of surgery for pain control     HOSPITAL COURSE:    Surgery was uncomplicated. Vince Krueger has done well post-operatively. Medicine was consulted post operatively to aid in management of medical comorbidities. See final recommendations below. The patient received routine nursing cares and is medically stable. Vital signs are stable. The patient is tolerating a regular diet without GI distress/nausea or vomiting. Voiding spontaneously. All PT/OT goals have been met for safe mobility. Pain is now controlled on oral medications which will be available on discharge. Stool softeners have been used while taking pain medications to help prevent constipation. Vince Krueger is deemed medically safe to discharge.     Antibiotics:   given periop and 24 hours postop.   DVT prophylaxis:   None  PT Progress: Has met PT/OT goals for safe mobility.    Pain Meds:  Weaned off all IV pain meds by discharge.  Inpatient Events: No significant events or complications.     Discharge orders and instructions as below.    FOLLOWUP:    Follow up with Dr. Hudson at 2 weeks postoperatively.    Appointments on [facility]. Call [phone] if you haven't heard regarding these appointments within 7 days of discharge.    PLANNED DISCHARGE ORDERS:     DVT Prophylaxis: None      Current Discharge Medication List      START taking these medications    Details   oxyCODONE (ROXICODONE) 5 MG IR tablet Take 1-2 tablets (5-10 mg) by mouth every 3  hours as needed for pain or other (Moderate to Severe)  Qty: 60 tablet, Refills: 0    Associated Diagnoses: Lytic lesion of bone on x-ray         CONTINUE these medications which have NOT CHANGED    Details   Multiple Vitamins-Minerals (MULTIVITAMIN ADULT) CHEW Take by mouth daily      loratadine (CLARITIN) 10 MG tablet Take 10 mg by mouth as needed     Associated Diagnoses: Lesion of right humerus               Discharge Procedure Orders  Discharge Instructions   Order Comments: SAME DAY SURGERY DISCHARGE INSTRUCTIONS:    Discharge disposition: Discharge to home    Diet: Advance to a regular diet as tolerated    Activity: Activity as tolerated    Follow-up: 2 weeks with provider    For 24 hours after surgery:  1. Get plenty of rest. A responsible adult must stay with you for at least 24 hours after you leave the hospital.  2. Do not drive or use heavy equipment. If you have weakness or tingling, don't drive or use heavy equipment until this feeling goes away.  3. Do not drink alcohol.  4. Avoid strenuous or risky activities. Ask for help when climbing stairs.  5. You may feel lightheaded. If so, sit for a few minutes before standing. Have someone help you get up.   6. If you have nausea (feel sick to your stomach), drink only clear liquids such as apple juice, ginger ale, broth, or 7-UP. Rest may also help. Be sure to drink enough fluids. Move to a regular diet as you feel able.   7. You may have a slight fever. Call your doctor if your fever is over 100.4 F (38 C) (taken under the tongue) or lasts longer than 24 hours.   8. You may have a dry mouth, sore throat, muscle aches, or trouble sleeping. These should go away after 24 hours.   9. Do not make important or or legal decisions.     Call your doctor for any of the followin. Signs of infection such as fever (temperature >100.4 F or 38 C), growing tenderness at the surgery site, a large amount of drainage or bleeding, severe pain, foul-smelling drainage,  redness, and/or swelling.  2. It has been over 8-10 hours since surgery, and you still have not urinated (or passsed water).   3. Headache for over 24 hours.   4. Numbness, tingling, or weakness the day after surgery (if you had spinal anesthesia).     To contact your doctor call [phone] and ask for the Orthopaedics resident on call. Alternatively, you may call the Emergency Department at the [place] - [phone] - or [place] - [phone] - [facility].    Review outpatient procedure discharge instructions as directed by MD. After discharge, If the patient has additional questions, please contact our nurse triage coordinators during normal business hours, 8 am - 5 pm, at [phone], option 3. After normal business hours, please contact the Orthopaedics resident on call at [phone], option 4.     Ice to affected area   Order Comments: Ice pack to surgical site every 15-30 minutes per hour for 24 hours as desired to minimize swelling and pain.     Shower   Order Comments: Patient may begin taking showers on the third day after surgery as long as the incision is dry. If there is drainage from the incision, cover the site with gauze and tape (3M brand Micropore tape is preferable) and keep it dry while showering. If there is not drainage, getting the wound wet while showering is acceptable. To dry, gently pat the incision dry and reapply dressing as per above instructions as needed.     For patients who underwent surgery on the upper extremity or below the knee, a bath can be taken at any time as long as the surgical site is kept out of the water.     Wound care   Order Comments: Leave the initial bandage (dressing) on for 72 hours (3 days) as long as the bandage is dry and intact.  If the incision is leaking any fluid or blood through or around the bandage, remove the existing bandage and replace it (gauze and tape). Wash hands before and after, and use gloves if available. A bandage is only  necessary for 3-4 days but you may keep one on as long as you desire for either comfort (due to irritation from overlying clothing) or for cosmetic reasons.    Sutures (Stitches) will dissolve, however, the exposed suture (looks like a clear fishing line) at each end of the incision should be clipped flush with the skin using clean scissors or a nail clipper anytime after the third day after surgery. If desired, this can be done by clinic personnel at your follow up office visit instead.     No driving or operating machinery   Order Comments: While using narcotic medication     No Alcohol   Order Comments: While using narcotic medication     Discharge Instructions - Lifting Limit (specify)   Order Comments: Lifting limit  5 pounds until seen at Post-op follow up appointment.     Reason for your hospital stay   Order Comments: Excision bone tumor right humerus         Oliver Love MD  Adult Reconstruction Fellow  [phone]

## 2017-05-20 NOTE — PROGRESS NOTES
05/20/17 1000   Visit Information   Visit Made By Staff    Type of Visit On-call   Visited Patient;Family  (Pt's spouse Kemi)   Interventions   Basic Spiritual Interventions    introduction/orientation to Spiritual Health Services;Assessment of spiritual needs/resources;Reflective conversation;Prayer   Advanced Assessments/Interventions   Presenting Concerns/Issues Spiritual/Adventist/emotional support   SPIRITUAL HEALTH SERVICES  [facility] ([facility] [place] 8A  ON-CALL VISIT     REFERRAL SOURCE: Pt request for  support with admission.  Pt's spouse was at bedside. Pt states he is hoping to go today and feels that his surgery went well. He requested prayer. Pt and spouse are Mu-ism     PLAN:  remain available per pt/family/staff request.     Shanell Mckenzie M.Div.  Staff    Pager [phone]

## 2017-05-20 NOTE — PROGRESS NOTES
"[facility], [place]   Internal Medicine Daily Note           Interval History/Events     Hand off taken from Dr. Dickerson  Overnight events reviewed.   Reports doing well  Pain controlled  Felt little dizzy after taking muscle relaxer  No chest pain, shortness of breath  No lightheadedness or dizziness.        Review of Systems        4 point ROS including Respiratory, CV, GI and , other than that noted above is negative      Medications   I have reviewed current medications  in the \"current medication\" section of Epic.  Relevant changes include:     Physical Exam   General:       Vital signs:    Blood pressure 114/65, temperature 97  F (36.1  C), temperature source Oral, resp. rate 16, height 1.829 m (6'), weight 75.6 kg (166 lb 10.7 oz), SpO2 98 %.  Estimated body mass index is 22.6 kg/(m^2) as calculated from the following:    Height as of this encounter: 1.829 m (6').    Weight as of this encounter: 75.6 kg (166 lb 10.7 oz).      Intake/Output Summary (Last 24 hours) at 05/20/17 1201  Last data filed at 05/20/17 0833   Gross per 24 hour   Intake             2290 ml   Output             1820 ml   Net              470 ml      HEENT: No icterus, no pallor  Cardiovascular: S1, S2 normal  Respiratory:  B/L CTA  GI/Abdomen: Soft, NT, BS+  Neurology: Alert, awake, and oriented. No tremors.   Extremities: Right shoulder dressing in place.   Skin:      Laboratory and Imaging Studies     I have reviewed  laboratory and imaging studies in the Epic. Pertinent findings are as below:    BMPNo lab results found in last 7 days.  CBC  Recent Labs  Lab 05/20/17  0618 05/19/17  2306   WBC  --  11.4*   RBC  --  4.07*   HGB 11.6* 11.8*   HCT  --  36.2*   MCV  --  89   MCH  --  29.0   MCHC  --  32.6   RDW  --  13.4   PLT  --  186     INR  Recent Labs  Lab 05/19/17  0805   INR 1.03     LFTsNo lab results found in last 7 days.   PANCNo lab results found in last 7 days.        Impression/Plan        Vince " Evans is a 41 year old male admitted on 5/19/2017 s/p biopsy, and ORIF Humerus on 5/19/2017      # S/p biopsy, and ORIF Humerus on 5/19/2017   Management primarily per Ortho team.  - Wound cares, Dressings, Surgical pain management, DVT Prophylaxis  per primary team.   - Monitor anemia, hemodynamics, Input/Output  - Encourage Incentive spirometry  - Laxatives for constipation prophylaxis     # Anemia: Most likely due to Hemodilution, blood loss, and post surgical inflammation.   - Transfuse if Hg < 7 gm/dl         # CVS: h/o Palpitations - evaluated by cardiology 2014 with Holter Monitor and Echo. Holter monitor demonstrated: sinus rhythm with rare premature atrial contraction or premature ventricular contraction; no sustained ventricular or supraventricular dysrhythmias. Normal stress echocardiogram (2014) with no wall motion abnormality at stress, no ischemic changes, LVEF 65%.  - currently stable.      # GI - Hiatal hernia, s/p nissen fundoplication, 2014     # MSK: bone lesion R humerus; fu pathology report    Thank you for letting us get involved in care of Mr Krueger.  Okay to discharge from medical stand point with follow plan from Orthopedics.         Pt's care was discussed with bedside RN, patient and  during Care Team Rounds.               Abdulkadir Willoughby MD  Hospitalist ( Internal medicine)  Pager: [phone]

## 2017-05-20 NOTE — ANESTHESIA POSTPROCEDURE EVALUATION
Patient: Vince Krueger    Procedure(s):  Biopsy, Curettage and Internal Fixation Right Humerus, and Supplemental Fixation with Zometa-Impregnated Cement - Wound Class: I-Clean   - Wound Class: I-Clean    Diagnosis:Lesion Right Humerus  Diagnosis Additional Information: No value filed.    Anesthesia Type:  MAC, Periph. Nerve Block for postop pain    Note:  Anesthesia Post Evaluation    Patient location during evaluation: PACU  Patient participation: Able to fully participate in evaluation  Level of consciousness: awake and alert  Pain management: adequate  Airway patency: patent  Cardiovascular status: acceptable  Respiratory status: acceptable  Hydration status: acceptable  PONV: none     Anesthetic complications: None          Last vitals:  Vitals:    05/19/17 2300 05/20/17 0440 05/20/17 0802   BP: 111/60 119/71 114/65   Resp: 17 17 16   Temp: 36.5  C (97.7  F) 36.3  C (97.3  F) 36.1  C (97  F)   SpO2: 98% 97% 98%         Electronically Signed By: Shae Madrigal MD  May 20, 2017  9:06 AM

## 2017-05-20 NOTE — PLAN OF CARE
Problem: Goal Outcome Summary  Goal: Goal Outcome Summary  Outcome: Improving  Patient A/Ox4. VSS. Denies CP, SOB, dizziness/LH. LSCTA. +fl/BS. Voiding well in urinal at bedside. CMS intact. Dressing to right arm CDI, ice applied. Tolerating clear liquid diet without NV, advanced to regular for AM. IS encouraged. Activity level has been good, pt stands at bedside to void. IV infusing fluids 125ml/hr. Pain rated as tolerable to comfortably managed throughout shift, given scheduled tylenol and PRN oxycodone.  Pt doing well and has no further concerns at this time. Patient has demonstrated ability to call appropriately. Patient is resting with call light within reach. Will continue to monitor.

## 2017-05-20 NOTE — PROGRESS NOTES
Orthopaedic Surgery Progress Note  Vince Krueger  : 1975       Assessment and Plan:    Vince Krueger is a 41 year old male status-post ORIF humerus POD 1    Activity: Up with assist.  Weight bearing status: Sling RUE  Antibiotics/Tetanus: Ancef x 24 hours.   Diet: Begin with clear fluids and progress diet as tolerated.   DVT prophylaxis: SCD's  Bracing/Splinting: Operative dressing to be kept clean, dry, and intact.   Elevation: Elevate operative site on pillows as tolerated.   Wound Care: Ice to surgical site. Keep dressing c/d/i  Pain management: transition from IV to orals as tolerated.    Physical Therapy/Occupational Therapy: Gait training, transfers, ROM, ADL's.   Consults: PT, OT. Appreciate assistance in caring for this patient.   Follow-up: Clinic with Dr. Hay in 2 weeks for wound check and x-rays needed.     Disposition: Pending progress with therapies, pain control on orals, and medical stability, anticipate discharge to Home today.         Interval History:     No acute events overnight. Pain is adequately controlled on current regimen. Tolerating regular diet. Progressing per PT pathway. Denies n/v, SOB, cp, dyspnea.         Physical Exam:     Vital Signs:  /65 (BP Location: Left arm)  Temp 97  F (36.1  C) (Oral)  Resp 16  Ht 1.829 m (6')  Wt 75.6 kg (166 lb 10.7 oz)  SpO2 98%  BMI 22.6 kg/m2    Intake/Output Summary (Last 24 hours) at 17 0916  Last data filed at 17 0833   Gross per 24 hour   Intake             3290 ml   Output             2225 ml   Net             1065 ml     Gen:    No acute distress. Alert.  Pulm:  Non-labored breathing  Dressing:   C/d/i   Ext:  CMS intact. Capillary refill is brisk, ext are wwp.    Labs:  CBC  Recent Labs  Lab 17  0618 17  2306   WBC  --  11.4*   HGB 11.6* 11.8*   PLT  --  186         Oliver Love  Adult Reconstruction Fellow   [phone]

## 2017-05-20 NOTE — PLAN OF CARE
Problem: Goal Outcome Summary  Goal: Goal Outcome Summary  Outcome: Adequate for Discharge Date Met:  05/20/17  Patient is A&O x 4. Pain managed with Oxycodone  10 mg. Got up and used with the BR with SBA. Pt is discharged, read and explained discharge instructions and medication. Patient and spouse verbalized understanding and signed AVS. Both left at 11:45 am

## 2017-05-20 NOTE — BRIEF OP NOTE
Brief Operative Note    Preop Dx:   Bone lesion R humerus   Post op Dx:    Same  Procedure:    Procedure(s):  Biopsy, Curettage and Internal Fixation Right Humerus, and Supplemental Fixation with Zometa-Impregnated Cement - Wound Class: I-Clean   - Wound Class: I-Clean  Surgeon:     MD Tristan  Assistants:    Ivan  Anesthesia:   GETA  EBL:    200cc  Total IV Fluids:  (See anesthesia record)  Blood transfusion  No transfusion was given during surgery  Specimens:   Tumor to path     Ivan  Recon Fellow  [phone]

## 2017-05-21 NOTE — OP NOTE
DATE OF SERVICE:  05/19/2017      PREOPERATIVE DIAGNOSES:   1.  Lytic lesion, right humerus.   2.  Elevated protein electrophoresis suggestive of multiple myeloma.      POSTOPERATIVE DIAGNOSES:   1.  Lytic lesion, right humerus.   2.  Elevated protein electrophoresis suggestive of multiple myeloma.      PROCEDURES:   1.  Excision and Curettage and bone lesion, right humerus.   2.  Placement of Zometa impregnated polymethyl methacrylate cement, right humerus.   3.  Open reduction internal fixation right humerus for impending pathologic fracture.      SURGEON:  John Hudson MD      ASSISTANT:  Oliver Love MD      ANESTHESIA:  General endotracheal.      ESTIMATED BLOOD LOSS:  200 mL.      DRAINS:  None.      COMPLICATIONS:  None apparent.      IMPLANTS:  Synthes 12-hole 4/5 diaphyseal locking plate with 2 batches of Zometa impregnated polymethyl methacrylate cement with a total of 8 mg of Zometa.      INDICATIONS FOR PROCEDURE:  Mr. Vince Krueger is a pleasant 41-year-old gentleman who was found to have a lytic lesion in his right humerus.  The patient was having a significant amount of pain and was therefore seen by Dr. Hudson preoperatively.  The patient had a Mirel score of 10-12, indicating a high likelihood of pathologic fracture.  He therefore came to the hospital today for a curettage and it fixation was Zometa cement placement of his right humerus lesion.      DESCRIPTION OF PROCEDURE:  Mr. Krueger was met in the preoperative area by Dr. Hudson.  Informed consent was obtained.  Initials were placed on the right arm indicating the correct operative extremity.  The patient was then brought to the operating room where general endotracheal anesthetic was induced by the Anesthesia Service.  This was successful.  He was then placed supine on the operating table with all bony prominences padded.  Preoperative antibiotics were given.  The right arm was prepped and draped in sterile fashion and a timeout was called by  the surgical team.  All in attendance were in agreement that the right upper arm was the correct operative extremity.  We began with a longitudinal incision centered between the interval between the deltoid and pectoralis major anteriorly developing the deltopectoral interval.  The skin was incised with a #10 blade and the Bovie was used to control subcutaneous bleeding.  We identified the cephalic vein and translated this laterally.  The fascia was incised and we dissected down to the bone superiorly.  Distally the brachialis muscle was split to expose the bone and the pathological lesion.  Upon inspection, there was a large amount of moth-eaten appearance of the humeral diaphysis and consistent with our preoperative decision to fix the patient's lesion.  We identified the radial nerve and translated this moderately at the distal margin of the lytic area.  After doing so, we entered the humeral diaphysis with a straight curet and proceeded with curettage of the lytic lesion.  A total of approximately 50-60 mL of soft tumor material were delivered from the humeral diaphysis.  After doing so, we proceeded with surgical fixation of the impending pathological fracture site.      After performing our exposure and curettage, we turned our attention to plate fixation of the humerus.  We extended our exposure of the bone proximally and distally to allow fixation of a Synthes 12-hole locking plate.  This would allow us to have 3 screws proximally and distally and good cortical bone that had not been affected from the tumor yet.  We provisionally fixed the plate proximally and distally with 2 cortical screws after fashioning the plate to allow a slight bend around the affected area.  After doing this, we added 2 more locking screws at the most proximal and distal margins of the plate and then predrilled holes for locking screw fixation in the mid portion of the plate.  After doing this, we turned our attention to placing the  Zometa cement in the lytic lesion.      After provisionally fixing our plate and drilling holes we mixed 2 batches of cement with Zometa to prevent any further bone resorption by the tumor bed.  After mixing the cement quickly it was injected in a pressurized fashion into the humeral diaphysis.  We took care prior to doing so to copiously irrigate the wound and remove all excess blood from the medullary canal.  After injecting the cement we quickly placed the remainder of the screws in the humeral diaphysis and then the cement was allowed to cure.  After allowing the cement to cure, we performed a final tightening of all screws to ensure good fixation into the cement interface.  At this time, the wound was then copiously irrigated.  The brachialis muscle was closed with a 0 Vicryl and the fascia over the deltopectoral interval was closed with 0 Vicryl, the subcutaneous tissue was closed with 2-0 Vicryl and the skin was closed with 3-0 PDS running suture.      Dr. Hudson was present for the entire procedure and participated in all key portions.      POSTOPERATIVE PLAN:  Mr. Kay will be admitted to the hospital overnight for pain control.  He will receive physical therapy for pendulum exercises of the shoulder and flexion of the elbow.  He will not require anticoagulation as this was an upper extremity surgery.  He will follow up with Dr. Hudson 2 weeks from surgery to discuss his pathology report.         TOMY HUDSON MD       As dictated by TABITHA BARAHONA MD            D: 2017 10:10   T: 2017 10:25   MT: CHRIS      Name:     ENRIQUETA KAY   MRN:      [id number]        Account:        [id number]   :      1975           Procedure Date: 2017      Document: [id number]

## 2017-05-23 ENCOUNTER — CARE COORDINATION (OUTPATIENT)
Dept: ONCOLOGY | Facility: CLINIC | Age: 42
End: 2017-05-23

## 2017-05-23 NOTE — PROGRESS NOTES
Called and answered patients questions about 24 hour urine sample.  Patient is collecting it today to turn into [facility] as it is closer to home.  Patient will RTC later this week as schedule.

## 2017-05-24 DIAGNOSIS — M89.9 LYTIC LESION OF BONE ON X-RAY: ICD-10-CM

## 2017-05-24 PROCEDURE — 86335 IMMUNFIX E-PHORSIS/URINE/CSF: CPT | Performed by: INTERNAL MEDICINE

## 2017-05-24 PROCEDURE — 84166 PROTEIN E-PHORESIS/URINE/CSF: CPT | Performed by: INTERNAL MEDICINE

## 2017-05-24 PROCEDURE — 84156 ASSAY OF PROTEIN URINE: CPT | Performed by: INTERNAL MEDICINE

## 2017-05-25 LAB
COPATH REPORT: NORMAL
ELP INTERPRETATION URINE: NORMAL
PROT ELPH PNL UR ELPH: NORMAL

## 2017-05-26 ENCOUNTER — OFFICE VISIT (OUTPATIENT)
Dept: TRANSPLANT | Facility: CLINIC | Age: 42
End: 2017-05-26
Attending: INTERNAL MEDICINE
Payer: COMMERCIAL

## 2017-05-26 VITALS
DIASTOLIC BLOOD PRESSURE: 80 MMHG | WEIGHT: 173.2 LBS | TEMPERATURE: 98.5 F | BODY MASS INDEX: 23.49 KG/M2 | RESPIRATION RATE: 18 BRPM | SYSTOLIC BLOOD PRESSURE: 124 MMHG | HEART RATE: 67 BPM

## 2017-05-26 DIAGNOSIS — D72.9 PLASMA CELL DISORDER: Primary | ICD-10-CM

## 2017-05-26 DIAGNOSIS — C90.30 NEOPLASM OF UNCERTAIN BEHAVIOR OF PLASMA CELLS (H): ICD-10-CM

## 2017-05-26 LAB
PROT 24H UR-MRATE: 0.13 G/(24.H) (ref 0.04–0.23)
PROT UR-MCNC: 0.07 G/L
PROT/CREAT 24H UR: 0.1 G/G CR (ref 0–0.2)

## 2017-05-26 PROCEDURE — 99212 OFFICE O/P EST SF 10 MIN: CPT

## 2017-05-26 NOTE — MR AVS SNAPSHOT
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  After hours call the   hospital at [phone] or   [phone]. Ask for the BMT   fellow on-call            Follow-ups after your visit        Your next 10 appointments already scheduled     Jun 01, 2017  6:45 AM CDT   PE NPET ONCOLOGY (EYES TO THIGHS) with UUPET1   [facility] PET CT ([facility])    [address] [facility] [id number] [phone]           Tell your doctor:   If there is any chance you may be pregnant or if you are breastfeeding.   If you have problems lying in small spaces (claustrophobia). If you do, your doctor may give you medicine to help you relax. If you have diabetes:   Have your exam early in the morning. Your blood glucose will go up as the day goes by.   Your glucose level must be 180 or less at the start of the exam. Please take any medicines you need to ensure this blood glucose level. 24 hours before your scan: Don t do any heavy exercise. (No jogging, aerobics or other workouts.) Exercise will make your pictures less accurate. 6 hours before your scan:   Stop all food and liquids (except water).   Do not chew gum or suck on mints.   If you need to take medicine with food, you may take it with a few crackers.  Please call your Imaging Department  at your exam site with any questions.            Jun 01, 2017 11:45 AM CDT   (Arrive by 11:30 AM)   Return Visit with John Hudson MD   [facility] ([facility])    [address] [place] [address]  [address] [facility] [id number] [phone]            Jun 01, 2017  1:00 PM CDT   (Arrive by 12:45 PM)   BONE MARROW BIOPSY with Yoly Restrepo PA-C, UU BONE MARROW BIOPSY   [facility] ([facility])    [address] [place] [address]  [address] [facility] [id number] [phone]           You may eat and drink prior to the procedure. You will have labs drawn prior to the procedure. You will be awake for the procedure. You will need a  to take you home. Please talk to your doctor about when to stop taking blood thinning medications. Please call our triage nurses with any questions that you may have at [phone].              Future tests that were ordered for you today     Open Future Orders        Priority Expected Expires Ordered    Bone marrow biopsy Routine  11/22/2017 5/26/2017    Leukemia Lymphoma Evaluation (Flow Cytometry) Routine  11/22/2017 5/26/2017    CHROMOSOME BONE MARROW With Professional Interpretation Routine  11/22/2017 5/26/2017    FISH With Professional Interpretation Routine  11/22/2017 5/26/2017    PET Oncology (Eyes to Thighs) Routine  7/10/2017 5/26/2017            Who to contact     If you have questions or need follow up information about today's clinic visit or your schedule please contact [facility] directly at [phone].  Normal or non-critical lab and imaging results will be communicated to you by MyChart, letter or phone within 4 business days after the clinic has received the results. If you do not hear from us within 7 days, please contact the clinic through MyChart or phone. If you have a critical or abnormal lab result, we will notify you by phone as soon as  possible.  Submit refill requests through [employer] or call your pharmacy and they will forward the refill request to us. Please allow 3 business days for your refill to be completed.          Additional Information About Your Visit        [employer] Information     [employer] gives you secure access to your electronic health record. If you see a primary care provider, you can also send messages to your care team and make appointments. If you have questions, please call your primary care clinic.  If you do not have a primary care provider, please call [phone] and they will assist you.        Care EveryWhere ID     This is your Care EveryWhere ID. This could be used by other organizations to access your [facility] medical records  [id number]        Your Vitals Were     Pulse Temperature Respirations BMI (Body Mass Index)          67 98.5  F (36.9  C) (Oral) 18 23.49 kg/m2         Blood Pressure from Last 3 Encounters:   05/26/17 124/80   05/20/17 114/65   05/17/17 133/85    Weight from Last 3 Encounters:   05/26/17 78.6 kg (173 lb 3.2 oz)   05/19/17 75.6 kg (166 lb 10.7 oz)   05/17/17 79 kg (174 lb 1.6 oz)               Recent Review Flowsheet Data     BMT Recent Results Latest Ref Rng & Units 5/4/2017 5/11/2017 5/19/2017 5/20/2017    WBC 4.0 - 11.0 10e9/L 4.8 - 11.4(H) -    Hemoglobin 13.3 - 17.7 g/dL 16.0 - 11.8(L) 11.6(L)    Platelet Count 150 - 450 10e9/L 244 - 186 -    INR 0.86 - 1.14 - - 1.03 -    Sodium 133 - 144 mmol/L - 138 - -    Potassium 3.4 - 5.3 mmol/L - 4.5 - -    Chloride 94 - 109 mmol/L - 103 - -    Glucose 70 - 99 mg/dL - 130(H) - -    Urea Nitrogen 7 - 30 mg/dL 16 12 - -    Creatinine 0.66 - 1.25 mg/dL 0.96 0.88 - -    Calcium (Total) 8.5 - 10.1 mg/dL 9.5 9.1 - -    Protein (Total) 6.8 - 8.8 g/dL - 8.5 - -    Albumin 3.4 - 5.0 g/dL - 3.9 - -    Alkaline Phosphatase 40 - 150 U/L 63 56 - -    AST 0 - 45 U/L - 13 - -    ALT 0 - 70 U/L - 21 - -    MCV 78 - 100 fl 90 - 89 -               Primary Care  Provider Office Phone # Fax #    Tanya Wen [phone]       [facility] [address] [place] [address]        Thank you!     Thank you for choosing [facility]  for your care. Our goal is always to provide you with excellent care. Hearing back from our patients is one way we can continue to improve our services. Please take a few minutes to complete the written survey that you may receive in the mail after your visit with us. Thank you!             Your Updated Medication List - Protect others around you: Learn how to safely use, store and throw away your medicines at www.disposemymeds.org.          This list is accurate as of: 5/26/17  4:59 PM.  Always use your most recent med list.                   Brand Name Dispense Instructions for use    loratadine 10 MG tablet    CLARITIN     Take 10 mg by mouth as needed       MULTIVITAMIN ADULT Chew      Take by mouth daily       oxyCODONE 5 MG IR tablet    ROXICODONE    60 tablet    Take 1-2 tablets (5-10 mg) by mouth every 3 hours as needed for pain or other (Moderate to Severe)       TYLENOL PO      Take 1,000 mg by mouth every 8 hours as needed for mild pain or fever

## 2017-05-26 NOTE — PROGRESS NOTES
HEMATOLOGY AND ONCOLOGY    RETURN PATIENT VISIT    NAME: Vince Krueger ARACELI: May 26, 2017   MRN: [id number]      REFERRING PHYSICIAN: John Hudson         Chief complaint:   Plasma cell neoplasm         History of Present Illness:   Vince Krueger is a 41 year old relatively healthy male who was evaluated for R arm pain for a year with a recent biopsy approximately 1 week ago showing plasma cell neoplasm.    Pain in R arm for more than 1 year. Treated as tennis elbow and various tendinitis entities and would get better for a period of time and then worse. Sometimes would have trouble washing windows or wiping things up. Rotation and twisting arm but picking things up was okay. No numbness or tingling. No associated skin changes. Also pain in the lower neck area for 4-5 years and was diagnosed as arthritis. No other pains.     Had surgery on Friday of last week. Has been wearing a sling. Follow up with Dr. Hudson and continues to use the sling. Has been doing exercises with the arms. Right now is using pain meds every 8 hours. Still is in a good amount of pain. Notes tylenol helping more with pain relief.          Review of Systems:   Energy level has generally been pretty good.   No numbness, tingling  No fevers, chills, sweats  No nausea, vomiting, diarrhea  No bruising or bleeding  No lumps or bumps  No recent change in weight  ROS: Comprehensive 10 point ROS negative except for that detailed above.          Past Medical History:     Past Medical History:   Diagnosis Date     Allergic rhinitis due to allergen      GERD (gastroesophageal reflux disease)      Hiatal hernia      Neck mass     surgically excised 2011   per patient, neck mass was taken out and found to have tumor per patient           Past Surgical History:     Past Surgical History:   Procedure Laterality Date     BIOPSY BONE UPPER EXTREMITY Right 5/19/2017    Procedure: BIOPSY BONE UPPER EXTREMITY;  Biopsy, Curettage and Internal Fixation Right Humerus,  and Supplemental Fixation with Zometa-Impregnated Cement;  Surgeon: John Hudson MD;  Location: [facility] OR     NECK SURGERY       NISSEN FUNDOPLICATION  2014     OPEN REDUCTION INTERNAL FIXATION HUMERUS DISTAL Right 5/19/2017    Procedure: OPEN REDUCTION INTERNAL FIXATION HUMERUS DISTAL;;  Surgeon: John Hudson MD;  Location: [facility] OR            Social History:     Social History     Social History     Marital status:      Spouse name: Kemi     Number of children: 4     Years of education: N/A     Occupational History     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 Gets dust and paint fume exposure at times. Doesn't wear a mask. No exposure to chemicals or radiation           Family History:     Family History   Problem Relation Age of Onset     Anxiety Disorder Brother      Unknown/Adopted Brother      Colon Cancer Brother      DIABETES Maternal Grandmother      Hyperlipidemia Maternal Grandmother    colon cancer brother diagnosed at age 38. No other known family history of cancer           Allergies:     Allergies   Allergen Reactions     No Clinical Screening - See Comments Hives     Palms and bottom of feet itching and hives     Penicillin G Hives     Swelling and turned red     Penicillins Hives, Rash and Swelling            Home Medications:     Current Outpatient Prescriptions   Medication     Acetaminophen (TYLENOL PO)     oxyCODONE (ROXICODONE) 5 MG IR tablet     loratadine  (CLARITIN) 10 MG tablet     Multiple Vitamins-Minerals (MULTIVITAMIN ADULT) CHEW     No current facility-administered medications for this visit.             Physical Exam:     /80 (BP Location: Left arm, Patient Position: Left side, Cuff Size: Adult Regular)  Pulse 67  Temp 98.5  F (36.9  C) (Oral)  Resp 18  Wt 78.6 kg (173 lb 3.2 oz)  BMI 23.49 kg/m2  Vitals:    17 1549   Weight: 78.6 kg (173 lb 3.2 oz)     ECO  General:  no acute distress.  HEENT: no cervical LAD  Skin: No concerning lesions or rash on exposed surfaces. Incision site approximately 6 inches in length R arm. C/D/I  HEENT: MMM  Respiratory: clear to auscultation bilaterally.  Cardiovascular: Regular rate and rhythm. No murmur or rub.   Abdomen: Normoactive bowel sounds. Abdomen soft, non-distended, and non-tender. No palpable masses or organomegaly.  Extremities: no edema  Neurologic: alert, conversing appropriately. Grossly non-focal.   Psychiatric: Mentation and affect appear normal.  Musculoskeletal: has limited ROM actively in R arm           Data:   CBC   Lab Results   Component Value Date/Time    WBC 11.4 (H) 2017 11:06 PM    HGB 11.6 (L) 2017 06:18 AM     2017 11:06 PM      BMP   Lab Results   Component Value Date/Time     2017 03:06 PM    POTASSIUM 4.5 2017 03:06 PM    BUN 12 2017 03:06 PM    CR 0.88 2017 03:06 PM    NIKOLAY 9.1 2017 03:06 PM      LFTs   Lab Results   Component Value Date/Time    BILITOTAL 0.5 2017 03:06 PM    ALKPHOS 56 2017 03:06 PM    AST 13 2017 03:06 PM    ALT 21 2017 03:06 PM     24-hour protein urine wnl. No monoclonal urine  Hemoglobin 11.6 from     K/L ratio 2.25  IgG 2240  M-spike 1.6         Images:   MRI    Impression:Marrow infiltrating process in the mid shaft right humerus with cortical disruption and soft tissue extension. Findings are most consistent with marrow infiltrating disease such as multiple  "myeloma, metastasis, and lymphoma, less likely a primary bone tumor. Given recent CT, findings are concerning for a myelomatous lesion, however definitive diagnosis by histopathology per orthopedic oncology.           Pathology:     Lab Results   Component Value Date    PATH  05/19/2017     Patient Name: ENRIQUETA KAY  MR#: [id number]  Specimen #: [id number]  Collected: 5/19/2017  Received: 5/19/2017  Reported: 5/25/2017 11:48  Ordering Phy(s): TOMY FORTUNE    SPECIMEN(S):  Right humerus lesion    FINAL DIAGNOSIS:  Right humerus lesion:    - Plasma cell neoplasm    - See comment    COMMENT:  The specimen is diagnostic of a plasma cell neoplasm with sheets of  plasma cells. The main differential includes solitary plasmacytoma of  bone and plasma cell myeloma. The diagnosis of plasmacytoma implies the  absence of marrow involvement by clonal plasma cells or features of  end-organ damage, which if present, may meet the diagnostic criteria for  plasma cell myeloma. Clinical correlation is recommended.  I have personally reviewed all specimens and or slides, including the  listed special stains, and used them with my medical judgement to  determine the final diagnosis.    Electronically signed out by:    Dennis Mccloud M.D.,[facility]    CLINICAL HISTORY:  Per EPIC electronic medical records: 41-year-old male presenting with  shoulder pain found to have a marrow infiltrating process of the humerus  on MRI scan after interventions were not relieving symptoms. The patient  also has an IgG kappa monoclonal paraproteinemia (1.6 g/dL, 05/04/2017).    GROSS:  The specimen is received in formalin with proper patient's  identification, labeled \"right humerus\".  The specimen consists of  multiple pink tan soft tissue, bony tissue fragments and blood clot  measuring in aggregate 5.5 x 3.4 x 1.4 cm. the total weight of specimen  is 21.4 g. Serially sectioned and representative sections are submitted  in six cassettes. " (Dictated by: Izzy Iglesias 5/19/2017 02:42 PM)    MICROSCOPIC:  H&E stained sections of the soft tissue mass demonstrate sheets of  morphologically atypical plasma cells containing enlarged nuclei with  coarse chromatin, variably prominent nucleoli, and moderate amounts of  eosinophilic cytoplasm.    Immunohistochemical stains were performed and evaluated on the paraffin  embedded block A3 and demonstrate granular expression of  by the  plasma cells, while stains for kappa and lambda demonstrate kappa light  chain restriction which lack CD20. CD3 highlights background T cells.  Cyclin D1 is negative.          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 Size of lesion quite extensive: 11.4 cm. Large size is one of major risk factors for progression to myeloma. No hypercalcemia, significant anemia, renal dysfunction, elevated urinary protein on 24-hour collection. Bone scan currently negative, but would be helpful to get more sensitivity with imaging to evaluate for occult bony disease as well as bone marrow to evaluate for potential myeloma. If this is plasmacytoma, high probability to progress to myeloma particularly in first 2 years after diagnosis.         Plan:   -PET scan to evaluate if occult bone lesions. Would ideally like to get done Thursday.  -plan for bone marrow biopsy to evaluate whether or not has involvement of bone marrow with plasma cell neoplasm. Would ideally like to get done Thursday.  -Dr. Panda will plan to talk to patient over the phone about bone marrow bx results  -Dr. Panda will talk to Dr. Hudson about healing time after surgery. After appropriate healing, will plan to have him see radiation  -if only solitary plasmacytoma, plan for radiation and active surveillance. If myeloma, would additionally plan to add chemotherapy as part of regimen.     Latest lab results discussed with  patient. All questions answered to patient satisfaction  Patient was seen and staffed with Dr. Amarilys Matta MD   PGY4  [facility] Onc Fellow      Staff Addendum: Patient was seen and examined by me. All labs, VS and pertinent images were reviewed with the patient, his wife and Dr. Matta. Plans for care were mutually developed and outlined above with my direct input.    Andre Panda, DO

## 2017-05-26 NOTE — NURSING NOTE
Oncology Rooming Note    May 26, 2017 3:54 PM   Vince Krueger is a 41 year old male who presents for:    No chief complaint on file.    Initial Vitals: /80 (BP Location: Left arm, Patient Position: Left side, Cuff Size: Adult Regular)  Pulse 67  Temp 98.5  F (36.9  C) (Oral)  Resp 18  Wt 78.6 kg (173 lb 3.2 oz)  BMI 23.49 kg/m2 Estimated body mass index is 23.49 kg/(m^2) as calculated from the following:    Height as of 5/19/17: 1.829 m (6').    Weight as of this encounter: 78.6 kg (173 lb 3.2 oz). Body surface area is 2 meters squared.  Data Unavailable Comment: Data Unavailable   No LMP for male patient.  Allergies reviewed: Yes  Medications reviewed: Yes    Medications: Medication refills not needed today.  Pharmacy name entered into EPIC: [facility] #[id number] - [address]    Clinical concerns: none MD was NOT notified.    6 minutes for nursing intake (face to face time)     Caitie Macias MA

## 2017-05-30 ENCOUNTER — TELEPHONE (OUTPATIENT)
Dept: OTHER | Facility: CLINIC | Age: 42
End: 2017-05-30

## 2017-05-31 DIAGNOSIS — C90.30: Primary | ICD-10-CM

## 2017-06-01 ENCOUNTER — HOSPITAL ENCOUNTER (OUTPATIENT)
Dept: PET IMAGING | Facility: CLINIC | Age: 42
End: 2017-06-01
Attending: INTERNAL MEDICINE
Payer: COMMERCIAL

## 2017-06-01 ENCOUNTER — OFFICE VISIT (OUTPATIENT)
Dept: ONCOLOGY | Facility: CLINIC | Age: 42
End: 2017-06-01
Attending: PHYSICIAN ASSISTANT
Payer: COMMERCIAL

## 2017-06-01 ENCOUNTER — HOSPITAL ENCOUNTER (OUTPATIENT)
Dept: PET IMAGING | Facility: CLINIC | Age: 42
Discharge: HOME | End: 2017-06-01
Attending: INTERNAL MEDICINE | Admitting: INTERNAL MEDICINE
Payer: COMMERCIAL

## 2017-06-01 ENCOUNTER — APPOINTMENT (OUTPATIENT)
Dept: LAB | Facility: CLINIC | Age: 42
End: 2017-06-01
Attending: INTERNAL MEDICINE
Payer: COMMERCIAL

## 2017-06-01 ENCOUNTER — OFFICE VISIT (OUTPATIENT)
Dept: ORTHOPEDICS | Facility: CLINIC | Age: 42
End: 2017-06-01

## 2017-06-01 VITALS — WEIGHT: 170.9 LBS | HEIGHT: 72 IN | BODY MASS INDEX: 23.15 KG/M2

## 2017-06-01 VITALS
WEIGHT: 170.1 LBS | OXYGEN SATURATION: 100 % | HEART RATE: 68 BPM | SYSTOLIC BLOOD PRESSURE: 123 MMHG | DIASTOLIC BLOOD PRESSURE: 82 MMHG | BODY MASS INDEX: 23.07 KG/M2 | TEMPERATURE: 98 F | RESPIRATION RATE: 16 BRPM

## 2017-06-01 DIAGNOSIS — C90.30 PLASMACYTOMA OF BONE (H): Primary | ICD-10-CM

## 2017-06-01 DIAGNOSIS — C90.30 NEOPLASM OF UNCERTAIN BEHAVIOR OF PLASMA CELLS (H): ICD-10-CM

## 2017-06-01 DIAGNOSIS — C90.30 PLASMACYTOMA OF BONE (H): ICD-10-CM

## 2017-06-01 DIAGNOSIS — D72.9 PLASMA CELL DISORDER: ICD-10-CM

## 2017-06-01 DIAGNOSIS — Z80.0 FAMILY HISTORY OF COLON CANCER: Primary | ICD-10-CM

## 2017-06-01 LAB
BASOPHILS # BLD AUTO: 0 10E9/L (ref 0–0.2)
BASOPHILS NFR BLD AUTO: 0.6 %
DIFFERENTIAL METHOD BLD: NORMAL
EOSINOPHIL # BLD AUTO: 0.1 10E9/L (ref 0–0.7)
EOSINOPHIL NFR BLD AUTO: 1.7 %
ERYTHROCYTE [DISTWIDTH] IN BLOOD BY AUTOMATED COUNT: 13.2 % (ref 10–15)
GLUCOSE BLDC GLUCOMTR-MCNC: 99 MG/DL (ref 70–99)
HCT VFR BLD AUTO: 41.2 % (ref 40–53)
HGB BLD-MCNC: 13.7 G/DL (ref 13.3–17.7)
IMM GRANULOCYTES # BLD: 0 10E9/L (ref 0–0.4)
IMM GRANULOCYTES NFR BLD: 0.3 %
LYMPHOCYTES # BLD AUTO: 1.4 10E9/L (ref 0.8–5.3)
LYMPHOCYTES NFR BLD AUTO: 20.3 %
MCH RBC QN AUTO: 28.9 PG (ref 26.5–33)
MCHC RBC AUTO-ENTMCNC: 33.3 G/DL (ref 31.5–36.5)
MCV RBC AUTO: 87 FL (ref 78–100)
MONOCYTES # BLD AUTO: 0.5 10E9/L (ref 0–1.3)
MONOCYTES NFR BLD AUTO: 7.2 %
NEUTROPHILS # BLD AUTO: 4.8 10E9/L (ref 1.6–8.3)
NEUTROPHILS NFR BLD AUTO: 69.9 %
NRBC # BLD AUTO: 0 10*3/UL
NRBC BLD AUTO-RTO: 0 /100
PLATELET # BLD AUTO: 261 10E9/L (ref 150–450)
RBC # BLD AUTO: 4.74 10E12/L (ref 4.4–5.9)
WBC # BLD AUTO: 6.9 10E9/L (ref 4–11)

## 2017-06-01 PROCEDURE — 88161 CYTOPATH SMEAR OTHER SOURCE: CPT | Performed by: INTERNAL MEDICINE

## 2017-06-01 PROCEDURE — 85025 COMPLETE CBC W/AUTO DIFF WBC: CPT | Performed by: PHYSICIAN ASSISTANT

## 2017-06-01 PROCEDURE — 88311 DECALCIFY TISSUE: CPT | Performed by: INTERNAL MEDICINE

## 2017-06-01 PROCEDURE — 96374 THER/PROPH/DIAG INJ IV PUSH: CPT

## 2017-06-01 PROCEDURE — 40000424 ZZHCL STATISTIC BONE MARROW CORE PERF TC 38221: Performed by: INTERNAL MEDICINE

## 2017-06-01 PROCEDURE — 38221 DX BONE MARROW BIOPSIES: CPT | Mod: 50,ZF | Performed by: PHYSICIAN ASSISTANT

## 2017-06-01 PROCEDURE — 70491 CT SOFT TISSUE NECK W/DYE: CPT

## 2017-06-01 PROCEDURE — G0364 BONE MARROW ASPIRATE &BIOPSY: HCPCS | Mod: ZF | Performed by: PHYSICIAN ASSISTANT

## 2017-06-01 PROCEDURE — 88271 CYTOGENETICS DNA PROBE: CPT | Performed by: INTERNAL MEDICINE

## 2017-06-01 PROCEDURE — 40001004 ZZHCL STATISTIC FLOW INT 9-15 ABY TC 88188: Performed by: INTERNAL MEDICINE

## 2017-06-01 PROCEDURE — 40000795 ZZHCL STATISTIC DNA PROCESS AND HOLD: Performed by: PHYSICIAN ASSISTANT

## 2017-06-01 PROCEDURE — A9552 F18 FDG: HCPCS | Performed by: INTERNAL MEDICINE

## 2017-06-01 PROCEDURE — 88341 IMHCHEM/IMCYTCHM EA ADD ANTB: CPT | Performed by: INTERNAL MEDICINE

## 2017-06-01 PROCEDURE — 88305 TISSUE EXAM BY PATHOLOGIST: CPT | Performed by: INTERNAL MEDICINE

## 2017-06-01 PROCEDURE — 88280 CHROMOSOME KARYOTYPE STUDY: CPT | Performed by: INTERNAL MEDICINE

## 2017-06-01 PROCEDURE — 88237 TISSUE CULTURE BONE MARROW: CPT | Performed by: INTERNAL MEDICINE

## 2017-06-01 PROCEDURE — 34300033 ZZH RX 343: Performed by: INTERNAL MEDICINE

## 2017-06-01 PROCEDURE — 36415 COLL VENOUS BLD VENIPUNCTURE: CPT | Performed by: PHYSICIAN ASSISTANT

## 2017-06-01 PROCEDURE — 88185 FLOWCYTOMETRY/TC ADD-ON: CPT | Performed by: INTERNAL MEDICINE

## 2017-06-01 PROCEDURE — 40000611 ZZHCL STATISTIC MORPHOLOGY W/INTERP HEMEPATH TC 85060: Performed by: INTERNAL MEDICINE

## 2017-06-01 PROCEDURE — 25000128 H RX IP 250 OP 636: Mod: ZF | Performed by: PHYSICIAN ASSISTANT

## 2017-06-01 PROCEDURE — 00000058 ZZHCL STATISTIC BONE MARROW ASP PERF TC 38220: Performed by: INTERNAL MEDICINE

## 2017-06-01 PROCEDURE — 82962 GLUCOSE BLOOD TEST: CPT

## 2017-06-01 PROCEDURE — 00000161 ZZHCL STATISTIC H-SPHEME PROCESS B/S: Performed by: INTERNAL MEDICINE

## 2017-06-01 PROCEDURE — 40000425 ZZHCL STATISTIC ADDL BM COR PERF TC 38221: Performed by: INTERNAL MEDICINE

## 2017-06-01 PROCEDURE — 88313 SPECIAL STAINS GROUP 2: CPT | Performed by: INTERNAL MEDICINE

## 2017-06-01 PROCEDURE — 40000951 ZZHCL STATISTIC BONE MARROW INTERP TC 85097: Performed by: INTERNAL MEDICINE

## 2017-06-01 PROCEDURE — 71260 CT THORAX DX C+: CPT

## 2017-06-01 PROCEDURE — 88264 CHROMOSOME ANALYSIS 20-25: CPT | Performed by: INTERNAL MEDICINE

## 2017-06-01 PROCEDURE — 88275 CYTOGENETICS 100-300: CPT | Performed by: INTERNAL MEDICINE

## 2017-06-01 PROCEDURE — 88184 FLOWCYTOMETRY/ TC 1 MARKER: CPT | Performed by: INTERNAL MEDICINE

## 2017-06-01 PROCEDURE — 88342 IMHCHEM/IMCYTCHM 1ST ANTB: CPT | Performed by: INTERNAL MEDICINE

## 2017-06-01 PROCEDURE — 25000128 H RX IP 250 OP 636: Performed by: INTERNAL MEDICINE

## 2017-06-01 PROCEDURE — 78816 PET IMAGE W/CT FULL BODY: CPT | Mod: PI

## 2017-06-01 RX ORDER — IOPAMIDOL 755 MG/ML
60-135 INJECTION, SOLUTION INTRAVASCULAR ONCE
Status: COMPLETED | OUTPATIENT
Start: 2017-06-01 | End: 2017-06-01

## 2017-06-01 RX ADMIN — MIDAZOLAM 2 MG: 1 INJECTION INTRAMUSCULAR; INTRAVENOUS at 13:42

## 2017-06-01 RX ADMIN — FLUDEOXYGLUCOSE F-18 10.9 MCI.: 500 INJECTION, SOLUTION INTRAVENOUS at 07:07

## 2017-06-01 RX ADMIN — IOPAMIDOL 93 ML: 755 INJECTION, SOLUTION INTRAVENOUS at 08:08

## 2017-06-01 ASSESSMENT — PAIN SCALES - GENERAL: PAINLEVEL: MILD PAIN (2)

## 2017-06-01 NOTE — LETTER
6/1/2017       RE: Vince Krueger  [address]  [facility] [address]     Dear Colleague,    Thank you for referring your patient, Vince Krueger, to the [facility] at [facility]. Please see a copy of my visit note below.    Orthopaedic Oncology clinic visit - TOÑO Hudson MD    Requesting physician: Georgi Hammond MD [facility]  Dr. Marlon Pulliam, [facility]  Dr. Radames Alvares [facility]  Tanya Wen NP primary care, [facility], [facility]     Diagnosis:  1. 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 5/19/2017, biopsy, tumor excision, ORIF with zometa impregnated cement ([facility]) [facility]      Interval History:  Doing well post-op    EXAM: incision healed.  Radial, median, ulnar nn intact motor function  Elbow -5-110 deg arc of motion  Forearm 90/90 sup and pronation    XR: intact fixation.  Plan:  1. Had alex kelly/ Dr. Andre Panda in Oncology for evaluation and management. Appointment is set for bone marrow biopsy today.  2. OK to proceed with any chemotherapy.  Hold on proceed with radiation to R arm for another 10 days.  3. RTC in 6 mo for XR of humerus.      MD Rai Eduardo Family Professor  Oncology and Adult Reconstructive Surgery  Dept Orthopaedic Surgery, [facility]  [phone] office, [phone] pager  [email or web address]    Total Time = 15 min, 50% of which was spent in counseling and coordination of care as documented above.

## 2017-06-01 NOTE — PROGRESS NOTES
BMT ONC Adult Bone Marrow Biopsy Procedure Note  June 1, 2017  There were no vitals taken for this visit.     Learning needs assessment complete within 12 months? YES    DIAGNOSIS: plasmacytoma    PROCEDURE: Bilateral Bone Marrow Biopsy    LOCATION: [facility] 2nd Floor    Patient s identification was positively verified by verbal identification and invasive procedure safety checklist was completed. Informed consent was obtained. Following the administration of Midazolam as pre-medication, patient was placed in the prone position and prepped and draped in a sterile manner. Approximately 20 cc of 1% Lidocaine was used over the bilateral posterior iliac spine. Following this a 3 mm incision was made. Trephine bone marrow core(s) was (were) obtained from the [facility]. Bone marrow aspirates were obtained from the [facility]. Aspirates were sent for morphology, immunophenotyping, cytogenetics, and process and hold flow. We missed research (tissue bank) due to miscommunication with [facility]. A total of approximately 20 ml of marrow was aspirated. Following this procedure a sterile dressing was applied to the bone marrow biopsy site(s). The patient was placed in the supine position to maintain pressure on the biopsy site. Post-procedure wound care instructions were given.     Complications: NO    Interventions: NO    Length of procedure:20 minutes or less      Procedure performed by: Yoly Restrepo PA-C

## 2017-06-01 NOTE — MR AVS SNAPSHOT
After Visit Summary   6/1/2017    Vince Krueger    MRN: [id number]           Patient Information     Date Of Birth          1975        Visit Information        Provider Department      6/1/2017 1:00 PM Yoly Restrepo PA-C; [facility] BONE MARROW BIOPSY [facility]        Today's Diagnoses     Family history of colon cancer    -  1    Plasmacytoma of bone (H)        Plasma cell disorder        Neoplasm of uncertain behavior of plasma cells (H)           Follow-ups after your visit        Future tests that were ordered for you today     Open Future Orders        Priority Expected Expires Ordered    CBC with platelets and differential Routine 6/1/2017 5/31/2018 5/31/2017    CT Soft Tissue Neck w Contrast Routine  5/31/2018 5/31/2017    PET Oncology Whole Body Routine  7/10/2017 5/26/2017            Who to contact     If you have questions or need follow up information about today's clinic visit or your schedule please contact [facility] directly at [phone].  Normal or non-critical lab and imaging results will be communicated to you by [employer], letter or phone within 4 business days after the clinic has received the results. If you do not hear from us within 7 days, please contact the clinic through [employer] or phone. If you have a critical or abnormal lab result, we will notify you by phone as soon as possible.  Submit refill requests through [employer] or call your pharmacy and they will forward the refill request to us. Please allow 3 business days for your refill to be completed.          Additional Information About Your Visit        MyChart Information     [employer] gives you secure access to your electronic health record. If you see a primary care provider, you can also send messages to your care team and make appointments. If you have questions, please call your primary care clinic.  If you do not have a primary care provider, please call [phone] and they  will assist you.        Care EveryWhere ID     This is your Care EveryWhere ID. This could be used by other organizations to access your [facility] medical records  [id number]        Your Vitals Were     Pulse Temperature Respirations Pulse Oximetry BMI (Body Mass Index)       68 98  F (36.7  C) 16 100% 23.07 kg/m2        Blood Pressure from Last 3 Encounters:   06/01/17 123/82   05/26/17 124/80   05/20/17 114/65    Weight from Last 3 Encounters:   06/01/17 77.2 kg (170 lb 1.6 oz)   06/01/17 77.5 kg (170 lb 14.4 oz)   05/26/17 78.6 kg (173 lb 3.2 oz)              We Performed the Following     Bone Marrow Aspirate Biopsy (Charge)     Bone marrow biopsy     Bone Marrow Core Biopsy Bilateral (Charge)     CBC with platelets differential     CHROMOSOME BONE MARROW With Professional Interpretation     FISH With Professional Interpretation     Leukemia Lymphoma Evaluation (Flow Cytometry)     Process and hold DNA        Primary Care Provider Office Phone # Fax #    Tanya Wen [phone]       [facility] [address] [place] [address]        Thank you!     Thank you for choosing [facility]  for your care. Our goal is always to provide you with excellent care. Hearing back from our patients is one way we can continue to improve our services. Please take a few minutes to complete the written survey that you may receive in the mail after your visit with us. Thank you!             Your Updated Medication List - Protect others around you: Learn how to safely use, store and throw away your medicines at www.disposemymeds.org.          This list is accurate as of: 6/1/17  3:07 PM.  Always use your most recent med list.                   Brand Name Dispense Instructions for use    loratadine 10 MG tablet    CLARITIN     Take 10 mg by mouth as needed       MULTIVITAMIN ADULT Chew      Take by mouth daily       oxyCODONE 5 MG IR tablet    ROXICODONE    60 tablet    Take 1-2  tablets (5-10 mg) by mouth every 3 hours as needed for pain or other (Moderate to Severe)       TYLENOL PO      Take 1,000 mg by mouth every 8 hours as needed for mild pain or fever

## 2017-06-01 NOTE — NURSING NOTE
Reason For Visit:   Chief Complaint   Patient presents with     Surgical Followup     2wk POP F/u Biopsy, Curettage and Internal Fixation Right Humerus, and Supplemental Fixation with Zometa-Impregnated Cement DOS: 5/19/17                 Ht 1.829 m (6')  Wt 77.5 kg (170 lb 14.4 oz)  BMI 23.18 kg/m2                        Pain Assessment  Patient Currently in Pain: Yes  Primary Pain Location: Shoulder  Pain Orientation: Right  Pain Descriptors: Aching                         Current Outpatient Prescriptions   Medication     Acetaminophen (TYLENOL PO)     Multiple Vitamins-Minerals (MULTIVITAMIN ADULT) CHEW     oxyCODONE (ROXICODONE) 5 MG IR tablet     loratadine (CLARITIN) 10 MG tablet     No current facility-administered medications for this visit.        Allergies   Allergen Reactions     No Clinical Screening - See Comments Hives     Palms and bottom of feet itching and hives     Penicillin G Hives     Swelling and turned red     Penicillins Hives, Rash and Swelling       Cindy Byrd C.M.A.

## 2017-06-01 NOTE — LETTER
6/1/2017      RE: Vince Krueger  [address] [facility] [address]       BMT ONC Adult Bone Marrow Biopsy Procedure Note  June 1, 2017  There were no vitals taken for this visit.     Learning needs assessment complete within 12 months? YES    DIAGNOSIS: plasmacytoma    PROCEDURE: Bilateral Bone Marrow Biopsy    LOCATION: [facility] 2nd Floor    Patient s identification was positively verified by verbal identification and invasive procedure safety checklist was completed. Informed consent was obtained. Following the administration of Midazolam as pre-medication, patient was placed in the prone position and prepped and draped in a sterile manner. Approximately 20 cc of 1% Lidocaine was used over the bilateral posterior iliac spine. Following this a 3 mm incision was made. Trephine bone marrow core(s) was (were) obtained from the [facility]. Bone marrow aspirates were obtained from the [facility]. Aspirates were sent for morphology, immunophenotyping, cytogenetics, and process and hold flow. We missed research (tissue bank) due to miscommunication with [facility]. A total of approximately 20 ml of marrow was aspirated. Following this procedure a sterile dressing was applied to the bone marrow biopsy site(s). The patient was placed in the supine position to maintain pressure on the biopsy site. Post-procedure wound care instructions were given.     Complications: NO    Interventions: NO    Length of procedure:20 minutes or less      Procedure performed by: oYly Restrepo PA-C

## 2017-06-01 NOTE — NURSING NOTE
Oncology Rooming Note    June 1, 2017 1:32 PM   Vince Krueger is a 41 year old male who presents for:    Chief Complaint   Patient presents with     Bone Marrow Biopsy     Patient here for BMBX r/t plasmacytoma.      Initial Vitals: /79  Pulse 70  Temp 98  F (36.7  C)  Resp 16  Wt 77.2 kg (170 lb 1.6 oz)  SpO2 100%  BMI 23.07 kg/m2 Estimated body mass index is 23.07 kg/(m^2) as calculated from the following:    Height as of an earlier encounter on 6/1/17: 1.829 m (6').    Weight as of this encounter: 77.2 kg (170 lb 1.6 oz). Body surface area is 1.98 meters squared.  Mild Pain (2) Comment: Data Unavailable   No LMP for male patient.  Allergies reviewed: Yes  Medications reviewed: Yes    Medications: Medication refills not needed today.  Pharmacy name entered into EPIC: [facility] #[id number] - [place] [address] [address] [facility]    Clinical concerns: NA     0 minutes for nursing intake (face to face time)     Dulce Ventura RN     BMT Teaching Flowsheet   Teaching Topic: post biopsy instructions    Person(s) involved in teaching: Patient  Motivation Level  Asks Questions: Yes  Eager to Learn: Yes  Cooperative: Yes  Receptive (willing/able to accept information): Yes    Patient demonstrates understanding of the following:   - Reason for the appointment, diagnosis and treatment plan: Yes  - Knowledge of proper use of medications and conditions for which they are ordered (with special attention to potential side effects or drug interactions): Yes  - Which situations necessitate calling provider and whom to contact: Yes    Teaching concerns addressed: reviewed activity restrictions if received premeds, potential for bleeding and actions to take if develops any of the issues below    Proper use and care of (medical equipment, care aids, etc.) Yes  Pain management techniques: Yes  Patient instructed on hand hygiene: Yes  How and/when to access community resources: Yes    Infection Control:  Patient demonstrates  understanding of the following:   Surgical procedure site care taught NA  Signs and symptoms of infection taught Yes  Wound care taught Yes  Central venous catheter care taught NA    Instructional Materials Used/Given: verbal, print out of post biopsy instructions.     Time spent with patient: 5 minutes.    Specific Concerns: NA

## 2017-06-01 NOTE — MR AVS SNAPSHOT
After Visit Summary   6/1/2017    Vince Krueger    MRN: [id number]           Patient Information     Date Of Birth          1975        Visit Information        Provider Department      6/1/2017 11:45 AM John Hudson MD [facility]        Today's Diagnoses     Plasmacytoma of bone (H)    -  1       Follow-ups after your visit        Your next 10 appointments already scheduled     Jun 01, 2017  1:00 PM CDT   (Arrive by 12:45 PM)   BONE MARROW BIOPSY with Yoly Restrepo PA-C,  BONE MARROW BIOPSY   [facility]    [address] [place] [address] [phone]           You may eat and drink prior to the procedure. You will have labs drawn prior to the procedure. You will be awake for the procedure. You will need a  to take you home. Please talk to your doctor about when to stop taking blood thinning medications. Please call our triage nurses with any questions that you may have at [phone].              Future tests that were ordered for you today     Open Future Orders        Priority Expected Expires Ordered    CBC with platelets and differential Routine 6/1/2017 5/31/2018 5/31/2017    CT Soft Tissue Neck w Contrast Routine  5/31/2018 5/31/2017    PET Oncology Whole Body Routine  7/10/2017 5/26/2017            Who to contact     Please call your clinic at [phone] to:    Ask questions about your health    Make or cancel appointments    Discuss your medicines    Learn about your test results    Speak to your doctor   If you have compliments or concerns about an experience at your clinic, or if you wish to file a complaint, please contact [facility] Patient Relations at [phone] or email us at [email or web address]         Additional Information About Your Visit        [employer] Information     Software 2000 gives you secure access to your electronic  health record. If you see a primary care provider, you can also send messages to your care team and make appointments. If you have questions, please call your primary care clinic.  If you do not have a primary care provider, please call [phone] and they will assist you.      [employer] is an electronic gateway that provides easy, online access to your medical records. With [employer], you can request a clinic appointment, read your test results, renew a prescription or communicate with your care team.     To access your existing account, please contact your [facility] or call [phone] for assistance.        Care EveryWhere ID     This is your Care EveryWhere ID. This could be used by other organizations to access your [place] medical records  [id number]        Your Vitals Were     Height BMI (Body Mass Index)                1.829 m (6') 23.18 kg/m2           Blood Pressure from Last 3 Encounters:   05/26/17 124/80   05/20/17 114/65   05/17/17 133/85    Weight from Last 3 Encounters:   06/01/17 77.5 kg (170 lb 14.4 oz)   05/26/17 78.6 kg (173 lb 3.2 oz)   05/19/17 75.6 kg (166 lb 10.7 oz)               Primary Care Provider Office Phone # Fax #    Tanya Wen [phone]       [facility] [address] [place] [address]        Thank you!     Thank you for choosing [facility]  for your care. Our goal is always to provide you with excellent care. Hearing back from our patients is one way we can continue to improve our services. Please take a few minutes to complete the written survey that you may receive in the mail after your visit with us. Thank you!             Your Updated Medication List - Protect others around you: Learn how to safely use, store and throw away your medicines at www.disposemymeds.org.          This list is accurate as of: 6/1/17 12:40 PM.  Always use your most recent med list.                   Brand Name  Dispense Instructions for use    loratadine 10 MG tablet    CLARITIN     Take 10 mg by mouth as needed       MULTIVITAMIN ADULT Chew      Take by mouth daily       oxyCODONE 5 MG IR tablet    ROXICODONE    60 tablet    Take 1-2 tablets (5-10 mg) by mouth every 3 hours as needed for pain or other (Moderate to Severe)       TYLENOL PO      Take 1,000 mg by mouth every 8 hours as needed for mild pain or fever

## 2017-06-01 NOTE — LETTER
6/1/2017       RE: Vince Krueger  [address] [place] [address]     Dear Colleague,    Thank you for referring your patient, Vince Krueger, to the [facility]. Please see a copy of my visit note below.    BMT ONC Adult Bone Marrow Biopsy Procedure Note  June 1, 2017  There were no vitals taken for this visit.     Learning needs assessment complete within 12 months? YES    DIAGNOSIS: plasmacytoma    PROCEDURE: Bilateral Bone Marrow Biopsy    LOCATION: [facility] 2nd Floor    Patient s identification was positively verified by verbal identification and invasive procedure safety checklist was completed. Informed consent was obtained. Following the administration of {pre-med:118813} as pre-medication, patient was placed in the {position:9234468} position and prepped and draped in a sterile manner. Approximately *** cc of 1% Lidocaine was used over the {r/l/b:7052541} posterior iliac spine. Following this a 3 mm incision was made. Trephine bone marrow core(s) was (were) obtained from the {Site:5419784}. Bone marrow aspirates were obtained from the {Site:8304605}. Aspirates were sent for {sent:5235459}. A total of approximately *** ml of marrow was aspirated. Following this procedure a sterile dressing was applied to the bone marrow biopsy site(s). The patient was placed in the supine position to maintain pressure on the biopsy site. Post-procedure wound care instructions were given.     Complications: {YES COMPLICATIONS/NO:576229}    Pre-procedural pain: {NUMBERS 1-10:62239} out of 10 on the numeric pain rating scale.     Procedural pain: {NUMBERS 1-10:24220} out of 10 on the numeric pain rating scale.     Post-procedural pain assessment: {NUMBERS 1-10:78497} out of 10 on the numeric pain rating scale.     Interventions: {YES COMPLICATIONS/NO:720797}    Length of procedure:{Length of procedure:008286}    Procedure performed by: ***      Again, thank you for allowing me to participate in the care of  your patient.      Sincerely,    Yoly Restrepo PA-C

## 2017-06-02 LAB — COPATH REPORT: NORMAL

## 2017-06-05 LAB — COPATH REPORT: NORMAL

## 2017-06-07 LAB
COPATH REPORT: NORMAL
COPATH REPORT: NORMAL

## 2017-06-08 LAB — COPATH REPORT: NORMAL

## 2017-06-12 DIAGNOSIS — C90.30 SOLITARY PLASMACYTOMA NOT HAVING ACHIEVED REMISSION (H): Primary | ICD-10-CM

## 2017-06-16 ENCOUNTER — OFFICE VISIT (OUTPATIENT)
Dept: RADIATION ONCOLOGY | Facility: CLINIC | Age: 42
End: 2017-06-16
Payer: COMMERCIAL

## 2017-06-16 VITALS
DIASTOLIC BLOOD PRESSURE: 68 MMHG | HEIGHT: 72 IN | WEIGHT: 171 LBS | TEMPERATURE: 98.2 F | BODY MASS INDEX: 23.16 KG/M2 | RESPIRATION RATE: 18 BRPM | SYSTOLIC BLOOD PRESSURE: 108 MMHG | OXYGEN SATURATION: 98 % | HEART RATE: 65 BPM

## 2017-06-16 DIAGNOSIS — C90.30 NEOPLASM OF UNCERTAIN BEHAVIOR OF PLASMA CELLS (H): Primary | ICD-10-CM

## 2017-06-16 PROCEDURE — 99205 OFFICE O/P NEW HI 60 MIN: CPT | Mod: 25 | Performed by: RADIOLOGY

## 2017-06-16 PROCEDURE — 77263 THER RADIOLOGY TX PLNG CPLX: CPT | Performed by: RADIOLOGY

## 2017-06-16 PROCEDURE — 77290 THER RAD SIMULAJ FIELD CPLX: CPT | Performed by: RADIOLOGY

## 2017-06-16 PROCEDURE — 77334 RADIATION TREATMENT AID(S): CPT | Performed by: RADIOLOGY

## 2017-06-16 ASSESSMENT — ENCOUNTER SYMPTOMS
MUSCULOSKELETAL NEGATIVE: 1
HEMOPTYSIS: 0
TREMORS: 0
DIZZINESS: 0
TINGLING: 1
SPUTUM PRODUCTION: 0
COUGH: 1
SPEECH CHANGE: 0
CARDIOVASCULAR NEGATIVE: 1
CONSTITUTIONAL NEGATIVE: 1
WHEEZING: 0
SEIZURES: 0
EYES NEGATIVE: 1
SENSORY CHANGE: 0
PSYCHIATRIC NEGATIVE: 1
GASTROINTESTINAL NEGATIVE: 1
LOSS OF CONSCIOUSNESS: 0
SHORTNESS OF BREATH: 0
FOCAL WEAKNESS: 0

## 2017-06-16 ASSESSMENT — PAIN SCALES - GENERAL: PAINLEVEL: NO PAIN (0)

## 2017-06-16 NOTE — PROGRESS NOTES
RADIATION ONCOLOGY CONSULT NOTE  Date of Visit: 2017    Vince Krueger  MRN: [id number]  : 1975    Vince Krueger is being seen today for initial consultation at the request of Dr. Andre Panda for consideration of radiation therapy for  Solitary plasmacytoma of bone.          HISTORY OF PRESENT ILLNESS:  Mr. Krueger is a 41 year old male who has had R arm pain for a year and a half with no intervention helping.  Pain worsened to the point of limiting ROM.  No x-ray was taken until he saw a chiropractor who was concerned about the level of pain and all the medical treatments that didn't work.  The plain film of the R humerus revealed a large lytic process in the R humeral shaft w/ corticol thinning.  MRI of the R arm 17 confirmed an infiltrative process in the R humerus measuring 11.4 cm.  The patient was assess for multiple myeloma with this lesion being the only lesion found and BM bx neg.  He underwent excision and curettage w/ placement of Zometa impregnanted cement along w/ ORIF  On 17 w/ pathology confirming plasma cell neoplasm as expected.  A post op PET revealed surgical changes and no obvious residual or distant disease.  He does have small pulm nodules which are too small to classify.  He is here to discuss primary radiation for the plasmacytoma and will be followed by medical oncology closely in the future.       All pertinent labs, imaging, and pathology findings have been reviewed.       CHEMOTHERAPY HISTORY: none    PAST RADIATION THERAPY HISTORY:  None    PAST MEDICAL/SURGICAL HISTORY:  Past Medical History:   Diagnosis Date     Allergic rhinitis due to allergen      GERD (gastroesophageal reflux disease)      Hiatal hernia      Neck mass     surgically excised      Plasmacytoma of bone (H) 2017    Right humerus     Past Surgical History:   Procedure Laterality Date     BIOPSY BONE UPPER EXTREMITY Right 2017    Procedure: BIOPSY BONE UPPER EXTREMITY;   Biopsy, Curettage and Internal Fixation Right Humerus, and Supplemental Fixation with Zometa-Impregnated Cement;  Surgeon: John Hudson MD;  Location: [facility] OR     HERNIA REPAIR  2014    Hiatal hernia     NECK SURGERY  2011    Benign mass     NISSEN FUNDOPLICATION  2014     OPEN REDUCTION INTERNAL FIXATION HUMERUS DISTAL Right 5/19/2017    Procedure: OPEN REDUCTION INTERNAL FIXATION HUMERUS DISTAL;;  Surgeon: John Hudson MD;  Location: [facility] OR       ALLERGIES:  Allergies as of 06/16/2017 - Leonel as Reviewed 06/16/2017   Allergen Reaction Noted     No clinical screening - see comments Hives 03/17/2014     Penicillin g Hives 02/05/2014     Penicillins Hives, Rash, and Swelling 11/21/2005       MEDICATIONS:  Current Outpatient Prescriptions   Medication Sig Dispense Refill     Ibuprofen (ADVIL PO) Take 200 mg by mouth every 6 hours as needed for moderate pain       Multiple Vitamins-Minerals (MULTIVITAMIN ADULT) CHEW Take by mouth daily       loratadine (CLARITIN) 10 MG tablet Take 10 mg by mouth as needed           FAMILY HISTORY:  Family History   Problem Relation Age of Onset     Anxiety Disorder Brother      Unknown/Adopted Brother      Colon Cancer Brother 37     DIABETES Maternal Grandmother      Hyperlipidemia Maternal Grandmother            SOCIAL HISTORY:  Social History     Social History     Marital status:      Spouse name: Kemi     Number of children: 4     Years of education: N/A     Occupational History     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     Social History Main Topics     Smoking status: Former Smoker     Packs/day: 1.00     Years: 5.00     Types: Cigarettes, Dip, chew, snus or snuff     Quit date: 9/1/1996     Smokeless tobacco: Former User     Types: Chew      Comment: Patient reports quit all tobacco in 1996     Alcohol use Yes      Comment: Social     Drug use: No     Sexual activity: Yes     Partners: Female     Birth control/ protection: Female Surgical     Other Topics  Concern     Not on file     Social History Narrative     Add'l soc hx:  The patient will be leaving this weekend for a trip to [place] with friends and family and will return the following weekend.     REVIEW OF SYSTEMS: A 10 point review of systems was obtained. Pertinent findings are noted in the HPI and are otherwise unremarkable.     PHYSICAL EXAM:  VITALS: /68 (BP Location: Left arm, Patient Position: Chair, Cuff Size: Adult Regular)  Pulse 65  Temp 98.2  F (36.8  C) (Oral)  Resp 18  Ht 1.829 m (6')  Wt 77.6 kg (171 lb)  SpO2 98%  BMI 23.19 kg/m2  GEN: Appears well, alert, oriented, and in NAD  R ARM:  He has a well healed incision extending down the majority of the R upper arm.  His mobility is limited, (but improving w/ PT according to the patient) with difficulty w/ abduction.    HEENT: NCAT, PERRL, EOMI, normal conjunctiva,  NECK: Supple, full ROM, no cervical or clavicular lymphadenopathy  CV: RRR,   RESP: no wheezes/rales/rhonchi, good aeration throughout all lung fields, breathing comfortably on room air  ABDOMEN: Soft, NT, ND  SKIN: Normal color and turgor  NEURO: No focal deficits, CN 3-12 grossly intact, normal and symmetric motor and sensory exam in bilateral upper and lower extremities, normal gait for age  PSYCH: Appropriate mood and affect      ECOG PERFORMANCE STATUS: 0-1      Radiology Reports:  The initial plain film of the R humerus revealed a large lytic process in the R humeral shaft w/ corticol thinning.  MRI of the R arm 5/9/17 confirmed an infiltrative process in the R humerus measuring 11.4 cm.   PET, 6/1/17, revealed surgical changes and no obvious residual or distant disease.  He does have small pulm nodules which are too small to classify.      Pathology Reports:   Path from surgery 5/19/17 confirmed plasma cell neoplasm w/ sheets of plasma cells.          IMPRESSION:  In summary, Mr. Krueger is a 41 year old male who presents with a solitary plasmacytoma of bone. This is  his only known site of disease and so doesn't have a diagnosis of multiple myeloma, but will be watched closely over the next few years.  He's here for treatment for the plasmacytoma.  He's had surgical stabilization of the R humerus given the high risk of pathologic fracture.          RECOMMENDATION:  I talked w/  about the use of radiation for solitary plasmacytoma of bone.  We talked about the potential benefits and potential risks.  He wished to proceed and I'll arrange treatment to begin once he returns from his trip next week.  I plan a dose of 45Gy at 1.8Gy per fraction, which I suspect he'll tolerate easily.  He'll continue to work during treatment so we'll work with him to make this as convenient as possible in terms of treatment time.           Chel Jasmine M.D.  Radiation Oncology

## 2017-06-16 NOTE — PATIENT INSTRUCTIONS
What to expect at your Simulation visit:    You will meet with a Radiation Therapist and other team members who will be doing a planning session called a  simulation  with you. This process will determine your daily treatment.    ~ You will lie on a flat table and have a treatment planning CT scan.  It is important during the scan to hold very still and breathe normally.    ~ Your therapist may construct a body mold to help you hold still for your treatments.    ~ If you are having treatment to the head or neck area you will be fitted with a plastic mesh mask that fits very snugly over your face and neck.     ~ Your therapist will be taking some digital photos that will go in your treatment chart.      ~Your therapist will make marks on your skin and take measurements. Your therapist may ask you about making small tattoos (a permanent small dot) over these marks.  These marks are used to position you daily for your radiation therapy treatments. Please do not wash off any marks until all of your radiation therapy treatments are complete unless you are instructed to do so by your therapist.    ~ Once the simulation is completed it can take from 3 to 10 business days before you start radiation therapy treatments.    ~ You may meet with a nurse who will go over management of treatment side effects and self care during your treatments. The nurse will help to plan care with other departments and physicians if needed.      Please contact [facility] RN with questions or concerns following today's appointment: [phone].    Thank you!

## 2017-06-16 NOTE — MR AVS SNAPSHOT
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 This process will determine your daily treatment.    ~ You will lie on a flat table and have a treatment planning CT scan.  It is important during the scan to hold very still and breathe normally.    ~ Your therapist may construct a body mold to help you hold still for your treatments.    ~ If you are having treatment to the head or neck area you will be fitted with a plastic mesh mask that fits very snugly over your face and neck.     ~ Your therapist will be taking some digital photos that will go in your treatment chart.      ~Your therapist will make marks on your skin and take measurements. Your therapist may ask you about making small tattoos (a permanent small dot) over these marks.  These marks are used to position you daily for your radiation therapy treatments. Please do not wash off any marks until all of your radiation therapy treatments are complete unless you are instructed to do so by your therapist.    ~ Once the simulation is completed it can take from 3 to 10 business days before you start radiation therapy treatments.    ~ You may meet with a nurse who will go over management of treatment side effects and self care during your treatments. The nurse will help to plan care with other departments and physicians if needed.      Please contact [facility] RN with questions or concerns following today's appointment:  [phone].    Thank you!            Follow-ups after your visit        Who to contact     If you have questions or need follow up information about today's clinic visit or your schedule please contact [facility] directly at [phone].  Normal or non-critical lab and imaging results will be communicated to you by [employer], letter or phone within 4 business days after the clinic has received the results. If you do not hear from us within 7 days, please contact the clinic through [employer] or phone. If you have a critical or abnormal lab result, we will notify you by phone as soon as possible.  Submit refill requests through [employer] or call your pharmacy and they will forward the refill request to us. Please allow 3 business days for your refill to be completed.          Additional Information About Your Visit        [employer] Information     [employer] gives you secure access to your electronic health record. If you see a primary care provider, you can also send messages to your care team and make appointments. If you have questions, please call your primary care clinic.  If you do not have a primary care provider, please call [phone] and they will assist you.      [employer] is an electronic gateway that provides easy, online access to your medical records. With [employer], you can request a clinic appointment, read your test results, renew a prescription or communicate with your care team.     To access your existing account, please contact your [facility] or call [phone] for assistance.        Care EveryWhere ID     This is your Care EveryWhere ID. This could be used by other organizations to access your [place] medical records  [id number]        Your Vitals Were     Pulse Temperature Respirations Height Pulse Oximetry BMI (Body Mass Index)    65 98.2  F (36.8  C) (Oral) 18 1.829 m (6') 98% 23.19 kg/m2       Blood Pressure from Last 3 Encounters:   06/16/17  108/68   06/01/17 123/82   05/26/17 124/80    Weight from Last 3 Encounters:   06/16/17 77.6 kg (171 lb)   06/01/17 77.2 kg (170 lb 1.6 oz)   06/01/17 77.5 kg (170 lb 14.4 oz)              Today, you had the following     No orders found for display       Primary Care Provider Office Phone # Fax #    Tanya CedilloMIGUEL ÁNGEL reynolds [phone]       [facility] [address] [place] [address]        Thank you!     Thank you for choosing [facility]  for your care. Our goal is always to provide you with excellent care. Hearing back from our patients is one way we can continue to improve our services. Please take a few minutes to complete the written survey that you may receive in the mail after your visit with us. Thank you!             Your Updated Medication List - Protect others around you: Learn how to safely use, store and throw away your medicines at www.disposemymeds.org.          This list is accurate as of: 6/16/17  9:30 AM.  Always use your most recent med list.                   Brand Name Dispense Instructions for use    ADVIL PO      Take 200 mg by mouth every 6 hours as needed for moderate pain       loratadine 10 MG tablet    CLARITIN     Take 10 mg by mouth as needed       MULTIVITAMIN ADULT Chew      Take by mouth daily

## 2017-06-16 NOTE — PROGRESS NOTES
HPI      Review of Systems   Constitutional: Negative.    HENT: Negative.    Eyes: Negative.    Respiratory: Positive for cough. Negative for hemoptysis, sputum production, shortness of breath and wheezing.         Patient reports intermittent coughing, denies sputum production.   Cardiovascular: Negative.    Gastrointestinal: Negative.    Genitourinary: Negative.    Musculoskeletal: Negative.    Skin: Negative.    Neurological: Positive for tingling. Negative for dizziness, tremors, sensory change, speech change, focal weakness, seizures and loss of consciousness.        Patient reports numbness of right upper extremity related to surgery on 5/19/2017, denies numbness or tingling of right forearm or right hand.   Endo/Heme/Allergies: Negative.    Psychiatric/Behavioral: Negative.              INITIAL PATIENT ASSESSMENT    Diagnosis: Plasmacytoma right humerus    Prior radiation therapy: None    Prior chemotherapy: None    Prior hormonal therapy:No    Pain Eval:  Denies at current visit, reports intermittent pain of right upper extremity, taking Advil po as needed.  Patient notes limited ROM of right upper extremity, performing physical therapy exercises at home.  Patient reports improving strength of right upper extremity.    Psychosocial  Living arrangements: Lives at home in [place] with wife and two children (ages 13 and 18).  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  Fall Risk: independent   referral needs: Not needed    Patient plans for a [place] fishing trip, leaving Sunday, 6/18/2017 with a return date of Saturday, 6/24/2017.     Advanced Directive: No  Implantable Cardiac Device? No    Reproductive note: Patient reports he has four children, Katya age 13, Akshat age 18, Urban age 22 and an adopted daughter, Carli age 25.    PCP: Tanya Wen NP  Ortho: Dr. Hudson  Oncology/Hematology: Dr. Panda

## 2017-06-20 ENCOUNTER — APPOINTMENT (OUTPATIENT)
Dept: RADIATION ONCOLOGY | Facility: CLINIC | Age: 42
End: 2017-06-20
Payer: COMMERCIAL

## 2017-06-20 DIAGNOSIS — C90.30 PLASMACYTOMA OF BONE (H): Primary | ICD-10-CM

## 2017-06-20 PROCEDURE — 77306 TELETHX ISODOSE PLAN SIMPLE: CPT | Performed by: RADIOLOGY

## 2017-06-26 ENCOUNTER — APPOINTMENT (OUTPATIENT)
Dept: RADIATION ONCOLOGY | Facility: CLINIC | Age: 42
End: 2017-06-26
Payer: COMMERCIAL

## 2017-06-26 DIAGNOSIS — C90.30 PLASMACYTOMA OF BONE (H): ICD-10-CM

## 2017-06-26 DIAGNOSIS — C90.30: ICD-10-CM

## 2017-06-26 LAB
BASOPHILS # BLD AUTO: 0 10E9/L (ref 0–0.2)
BASOPHILS NFR BLD AUTO: 0.3 %
DIFFERENTIAL METHOD BLD: NORMAL
EOSINOPHIL # BLD AUTO: 0.1 10E9/L (ref 0–0.7)
EOSINOPHIL NFR BLD AUTO: 1.3 %
ERYTHROCYTE [DISTWIDTH] IN BLOOD BY AUTOMATED COUNT: 14.1 % (ref 10–15)
HCT VFR BLD AUTO: 41.4 % (ref 40–53)
HGB BLD-MCNC: 13.7 G/DL (ref 13.3–17.7)
LYMPHOCYTES # BLD AUTO: 1.6 10E9/L (ref 0.8–5.3)
LYMPHOCYTES NFR BLD AUTO: 26.1 %
MCH RBC QN AUTO: 29.3 PG (ref 26.5–33)
MCHC RBC AUTO-ENTMCNC: 33.1 G/DL (ref 31.5–36.5)
MCV RBC AUTO: 89 FL (ref 78–100)
MONOCYTES # BLD AUTO: 0.5 10E9/L (ref 0–1.3)
MONOCYTES NFR BLD AUTO: 7.6 %
NEUTROPHILS # BLD AUTO: 4.1 10E9/L (ref 1.6–8.3)
NEUTROPHILS NFR BLD AUTO: 64.7 %
PLATELET # BLD AUTO: 178 10E9/L (ref 150–450)
RBC # BLD AUTO: 4.67 10E12/L (ref 4.4–5.9)
WBC # BLD AUTO: 6.3 10E9/L (ref 4–11)

## 2017-06-26 PROCEDURE — 84165 PROTEIN E-PHORESIS SERUM: CPT | Performed by: PHYSICIAN ASSISTANT

## 2017-06-26 PROCEDURE — 85025 COMPLETE CBC W/AUTO DIFF WBC: CPT | Performed by: PHYSICIAN ASSISTANT

## 2017-06-26 PROCEDURE — 77280 THER RAD SIMULAJ FIELD SMPL: CPT | Performed by: RADIOLOGY

## 2017-06-26 PROCEDURE — 77402 RADIATION TX DELIVERY LVL 1: CPT | Performed by: RADIOLOGY

## 2017-06-26 PROCEDURE — 00000402 ZZHCL STATISTIC TOTAL PROTEIN: Performed by: PHYSICIAN ASSISTANT

## 2017-06-26 PROCEDURE — 82784 ASSAY IGA/IGD/IGG/IGM EACH: CPT | Performed by: PHYSICIAN ASSISTANT

## 2017-06-26 PROCEDURE — 36415 COLL VENOUS BLD VENIPUNCTURE: CPT | Performed by: PHYSICIAN ASSISTANT

## 2017-06-27 ENCOUNTER — APPOINTMENT (OUTPATIENT)
Dept: RADIATION ONCOLOGY | Facility: CLINIC | Age: 42
End: 2017-06-27
Payer: COMMERCIAL

## 2017-06-27 LAB
ALBUMIN SERPL ELPH-MCNC: 4.3 G/DL (ref 3.7–5.1)
ALPHA1 GLOB SERPL ELPH-MCNC: 0.3 G/DL (ref 0.2–0.4)
ALPHA2 GLOB SERPL ELPH-MCNC: 0.6 G/DL (ref 0.5–0.9)
B-GLOBULIN SERPL ELPH-MCNC: 0.8 G/DL (ref 0.6–1)
GAMMA GLOB SERPL ELPH-MCNC: 1.5 G/DL (ref 0.7–1.6)
IGG SERPL-MCNC: 1410 MG/DL (ref 695–1620)
M PROTEIN SERPL ELPH-MCNC: 0.7 G/DL
PROT PATTERN SERPL ELPH-IMP: ABNORMAL

## 2017-06-27 PROCEDURE — 77402 RADIATION TX DELIVERY LVL 1: CPT | Performed by: RADIOLOGY

## 2017-06-28 ENCOUNTER — APPOINTMENT (OUTPATIENT)
Dept: RADIATION ONCOLOGY | Facility: CLINIC | Age: 42
End: 2017-06-28
Payer: COMMERCIAL

## 2017-06-28 PROCEDURE — 77402 RADIATION TX DELIVERY LVL 1: CPT | Performed by: RADIOLOGY

## 2017-06-29 ENCOUNTER — APPOINTMENT (OUTPATIENT)
Dept: RADIATION ONCOLOGY | Facility: CLINIC | Age: 42
End: 2017-06-29
Payer: COMMERCIAL

## 2017-06-29 ENCOUNTER — OFFICE VISIT (OUTPATIENT)
Dept: RADIATION ONCOLOGY | Facility: CLINIC | Age: 42
End: 2017-06-29
Payer: COMMERCIAL

## 2017-06-29 VITALS — WEIGHT: 172 LBS | BODY MASS INDEX: 23.33 KG/M2

## 2017-06-29 DIAGNOSIS — C90.30 NEOPLASM OF UNCERTAIN BEHAVIOR OF PLASMA CELLS (H): Primary | ICD-10-CM

## 2017-06-29 PROCEDURE — 77402 RADIATION TX DELIVERY LVL 1: CPT | Performed by: RADIOLOGY

## 2017-06-29 PROCEDURE — 99207 ZZC NO BILLABLE SERVICE THIS VISIT: CPT | Performed by: RADIOLOGY

## 2017-06-29 ASSESSMENT — PAIN SCALES - GENERAL: PAINLEVEL: NO PAIN (0)

## 2017-06-29 NOTE — PATIENT INSTRUCTIONS
Please contact [facility] RN with questions or concerns following today's appointment: [phone].    Thank you!

## 2017-06-29 NOTE — MR AVS SNAPSHOT
After Visit Summary   6/29/2017    Vince Krueger    MRN: [id number]           Patient Information     Date Of Birth          1975        Visit Information        Provider Department      6/29/2017 2:00 PM Chel Jasmine MD 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    Thank you!            Follow-ups after your visit        Your next 10 appointments already scheduled     Jun 30, 2017  1:45 PM CDT   TREATMENT with RADIATION THERAPIST   [facility] ([facility])    [address] [facility] [address]   [phone]            Jul 03, 2017  8:00 AM CDT   TREATMENT with RADIATION THERAPIST   [facility] ([facility])    [address] [facility] [address]   [phone]            Jul 05, 2017  8:00 AM CDT   TREATMENT with RADIATION THERAPIST   [facility] ([facility])    [address] [facility] [address]   [phone]            Jul 06, 2017  8:00 AM CDT   TREATMENT with RADIATION THERAPIST   [facility] ([facility])    [address] [facility] [address]   [phone]            Jul 06, 2017  8:15 AM CDT   on treatment visit with Gulshan Shah MD   [facility] ([facility])    [address] [facility] [address]   [phone]            Jul 07, 2017  8:00 AM CDT   TREATMENT with RADIATION THERAPIST   [facility] ([facility])    [address] [facility] [address]   [phone]            Jul 10, 2017  8:00 AM CDT   TREATMENT with RADIATION THERAPIST   [facility] ([facility])    [address] [facility] [address]   [phone]            Jul 11, 2017  8:00 AM CDT   TREATMENT with RADIATION THERAPIST   [facility] ([facility])    [address] [facility] [address]   [phone]            Jul 12, 2017  8:00 AM CDT   TREATMENT with RADIATION THERAPIST   [facility] ([facility])    [address] [address]   [phone]            Jul 13, 2017  8:15 AM CDT   on treatment visit with Chel Jasmine MD   [facility] ([facility])    [address] [address]   [phone]              Who to contact     If you have questions or need follow up information about today's clinic visit or your schedule please contact [facility] directly at [phone].  Normal or non-critical lab and imaging results will be communicated to you by TripTouchhart, letter or phone within 4 business days after the clinic has received the results. If you do not hear from us within 7 days, please contact the clinic through TripTouchhart or phone. If you have a critical or abnormal lab result, we will notify you by phone as soon as possible.  Submit refill requests through Infinancials or call your pharmacy and they will forward the refill request to us. Please allow 3 business days for your refill to be completed.          Additional Information About Your Visit        TripTouchhart Information     Infinancials gives you secure access to your electronic health record. If you see a primary care provider, you can also send messages to your care team and make appointments. If you have questions, please call your primary care clinic.  If you do not have a primary care provider, please call [phone] and they will assist you.      Infinancials is an electronic gateway that provides easy, online access to your medical records. With  Vishal, you can request a clinic appointment, read your test results, renew a prescription or communicate with your care team.     To access your existing account, please contact your [facility] or call [phone] for assistance.        Care EveryWhere ID     This is your Care EveryWhere ID. This could be used by other organizations to access your [place] medical records  [id number]        Your Vitals Were     BMI (Body Mass Index)                   23.33 kg/m2            Blood Pressure from Last 3 Encounters:   06/16/17 108/68   06/01/17 123/82   05/26/17 124/80    Weight from Last 3 Encounters:   06/29/17 78 kg (172 lb)   06/16/17 77.6 kg (171 lb)   06/01/17 77.2 kg (170 lb 1.6 oz)              Today, you had the following     No orders found for display       Primary Care Provider Office Phone # Fax #    Tanya CedilloMIGUEL ÁNGEL reynolds [phone]       [facility] [address] [place] [facility] [address]        Equal Access to Services     [facility]: Hadii aad ku hadasho Soomaali, waaxda luqadaha, qaybta kaalmada adeegyada, leonardo aparicio hayroxann pineda . So [facility] [phone].    ATENCIÓN: Si habla español, tiene a javier disposición servicios gratuitos de asistencia lingüística. Llame al [phone].    We comply with applicable federal civil rights laws and [place] laws. We do not discriminate on the basis of race, color, national origin, age, disability sex, sexual orientation or gender identity.            Thank you!     Thank you for choosing [facility]  for your care. Our goal is always to provide you with excellent care. Hearing back from our patients is one way we can continue to improve our services. Please take a few minutes to complete the written survey that you may receive in the mail after your visit with us. Thank you!             Your Updated Medication List - Protect others around you: Learn how to safely use, store and  throw away your medicines at www.disposemymeds.org.          This list is accurate as of: 6/29/17  2:21 PM.  Always use your most recent med list.                   Brand Name Dispense Instructions for use Diagnosis    ADVIL PO      Take 200 mg by mouth every 6 hours as needed for moderate pain        loratadine 10 MG tablet    CLARITIN     Take 10 mg by mouth as needed    Lesion of right humerus       MULTIVITAMIN ADULT Chew      Take by mouth daily

## 2017-06-30 ENCOUNTER — APPOINTMENT (OUTPATIENT)
Dept: RADIATION ONCOLOGY | Facility: CLINIC | Age: 42
End: 2017-06-30
Payer: COMMERCIAL

## 2017-06-30 PROCEDURE — 77402 RADIATION TX DELIVERY LVL 1: CPT | Performed by: RADIOLOGY

## 2017-06-30 PROCEDURE — 77427 RADIATION TX MANAGEMENT X5: CPT | Performed by: RADIOLOGY

## 2017-06-30 PROCEDURE — 77336 RADIATION PHYSICS CONSULT: CPT | Performed by: RADIOLOGY

## 2017-07-05 ENCOUNTER — APPOINTMENT (OUTPATIENT)
Dept: RADIATION ONCOLOGY | Facility: CLINIC | Age: 42
End: 2017-07-05
Payer: COMMERCIAL

## 2017-07-05 PROCEDURE — 77417 THER RADIOLOGY PORT IMAGE(S): CPT | Performed by: RADIOLOGY

## 2017-07-05 PROCEDURE — 77402 RADIATION TX DELIVERY LVL 1: CPT | Performed by: RADIOLOGY

## 2017-07-06 ENCOUNTER — APPOINTMENT (OUTPATIENT)
Dept: RADIATION ONCOLOGY | Facility: CLINIC | Age: 42
End: 2017-07-06
Payer: COMMERCIAL

## 2017-07-06 ENCOUNTER — OFFICE VISIT (OUTPATIENT)
Dept: RADIATION ONCOLOGY | Facility: CLINIC | Age: 42
End: 2017-07-06

## 2017-07-06 VITALS — WEIGHT: 174 LBS | BODY MASS INDEX: 23.6 KG/M2

## 2017-07-06 DIAGNOSIS — C90.30 PLASMACYTOMA OF BONE (H): Primary | ICD-10-CM

## 2017-07-06 PROCEDURE — 77402 RADIATION TX DELIVERY LVL 1: CPT | Performed by: RADIOLOGY

## 2017-07-06 ASSESSMENT — PAIN SCALES - GENERAL: PAINLEVEL: NO PAIN (0)

## 2017-07-06 NOTE — MR AVS SNAPSHOT
After Visit Summary   7/6/2017    Vince Krueger    MRN: [id number]           Patient Information     Date Of Birth          1975        Visit Information        Provider Department      7/6/2017 8:15 AM Gulshan Shah MD [facility]        Today's Diagnoses     Plasmacytoma of bone (H)    -  1      Care Instructions    Please contact [facility] On[facility] RN with questions or concerns following today's appointment: [phone].    Thank you!            Follow-ups after your visit        Your next 10 appointments already scheduled     Jul 07, 2017  8:00 AM CDT   TREATMENT with RADIATION THERAPIST   [facility] ([facility])    [address] [facility] [address]   [phone]            Jul 10, 2017  8:00 AM CDT   TREATMENT with RADIATION THERAPIST   [facility] ([facility])    [address] [facility] [address]   [phone]            Jul 11, 2017  8:00 AM CDT   TREATMENT with RADIATION THERAPIST   [facility] ([facility])    [address] [facility] [address]   [phone]            Jul 12, 2017  8:00 AM CDT   TREATMENT with RADIATION THERAPIST   [facility] ([facility])    [address] [facility] [address]   [phone]            Jul 13, 2017  8:00 AM CDT   TREATMENT with RADIATION THERAPIST   [facility] ([facility])    [address] [facility] [address]   [phone]            Jul 13, 2017  8:15 AM CDT   on treatment visit with Chel Jasmine MD   [facility] ([facility])    [address] [facility] [address]   [phone]            Jul 14, 2017  8:00 AM CDT   TREATMENT with RADIATION THERAPIST   [facility] ([facility])    [address] [facility] [address]   [phone]            Jul 17, 2017  8:00 AM CDT   TREATMENT with RADIATION THERAPIST   [facility]    [address] [facility] [address]   [phone]            Jul 18, 2017  8:00 AM CDT   TREATMENT with RADIATION THERAPIST   [facility]    [address] [facility] [address]   [phone]            Jul 19, 2017  8:00 AM CDT   TREATMENT with RADIATION THERAPIST   [facility]    [address] [facility] [address] [phone]              Who to contact     If you have questions or need follow up information about today's clinic visit or your schedule please contact [facility] directly at [phone].  Normal or non-critical lab and imaging results will be communicated to you by MadRat Gameshart, letter or phone within 4 business days after the clinic has received the results. If you do not hear from us within 7 days, please contact the clinic through Cintt or phone. If you have a critical or abnormal lab result, we will notify you by phone as soon as possible.  Submit refill requests through Sensorist or call your pharmacy and they will forward the refill request to us. Please allow 3 business days for your refill to be completed.          Additional Information About Your Visit        MadRat [employer] Information     Sensorist gives you secure access to your electronic health record. If you see a primary care provider, you can also send messages to your care team and make appointments. If you have questions, please call your primary care clinic.  If you do not have a primary care provider, please call [phone] and they will assist you.      Sensorist is an electronic gateway that provides easy, online access to your medical records. With Sensorist, you can request a clinic appointment, read  your test results, renew a prescription or communicate with your care team.     To access your existing account, please contact your [facility] or call [phone] for assistance.        Care EveryWhere ID     This is your Care EveryWhere ID. This could be used by other organizations to access your [place] medical records  [id number]        Your Vitals Were     BMI (Body Mass Index)                   23.6 kg/m2            Blood Pressure from Last 3 Encounters:   06/16/17 108/68   06/01/17 123/82   05/26/17 124/80    Weight from Last 3 Encounters:   07/06/17 78.9 kg (174 lb)   06/29/17 78 kg (172 lb)   06/16/17 77.6 kg (171 lb)              Today, you had the following     No orders found for display       Primary Care Provider Office Phone # Fax #    Tanya WenMIGUEL ÁNGEL [phone]       [facility] [address] [place] [address]        Equal Access to Services     [facility]: Hadii aad ku hadasho Soomaali, waaxda luqadaha, qaybta kaalmada adeegyada, waxay idiin haygiselen jamie pineda . So [facility] [phone].    ATENCIÓN: Si habla español, tiene a javier disposición servicios gratuitos de asistencia lingüística. Llame al [phone].    We comply with applicable federal civil rights laws and [place] laws. We do not discriminate on the basis of race, color, national origin, age, disability sex, sexual orientation or gender identity.            Thank you!     Thank you for choosing [facility]  for your care. Our goal is always to provide you with excellent care. Hearing back from our patients is one way we can continue to improve our services. Please take a few minutes to complete the written survey that you may receive in the mail after your visit with us. Thank you!             Your Updated Medication List - Protect others around you: Learn how to safely use, store and throw away your medicines at www.disposemymeds.org.           This list is accurate as of: 7/6/17  9:06 AM.  Always use your most recent med list.                   Brand Name Dispense Instructions for use Diagnosis    ADVIL PO      Take 200 mg by mouth every 6 hours as needed for moderate pain        loratadine 10 MG tablet    CLARITIN     Take 10 mg by mouth as needed    Lesion of right humerus       MULTIVITAMIN ADULT Chew      Take by mouth daily

## 2017-07-06 NOTE — PROGRESS NOTES
[facility] PHYSICIANS  SPECIALIZING IN BREAKTHROUGHS  Radiation Oncology    On Treatment Visit Note      Vince Krueger      Date: 2017   MRN: [id number]   : 1975  Diagnosis: plasmacytoma of right humerus      Reason for Visit:  On Radiation Treatment Visit     Treatment Summary to Date  Treatment Site: right humerus Current Dose: 1260/4500 cGy Fractions:       Chemotherapy  Chemo concurrent with radx?: No (Dr. Panda)    Subjective:  Mild discomfort     Nursing ROS:   Nutrition Alteration  Diet Type: Patient's Preference  Skin  Skin Reaction: 0 - No changes  Skin Intervention: patient using Aquaphor        Cardiovascular  Respiratory effort: 1 - Normal - without distress        Psychosocial  Mood - Anxiety: 0 - Normal  Mood - Depression: 0 - Normal  Pyschosocial Note: energy level at baseline  Pain Assessment  0-10 Pain Scale: 0  Pain Treatment: patient reports taking Ibuprofen as needed      Objective:   Wt 78.9 kg (174 lb)  BMI 23.6 kg/m2    Labs:  CBC RESULTS:   Recent Labs   Lab Test  17   1602   WBC  6.3   RBC  4.67   HGB  13.7   HCT  41.4   MCV  89   MCH  29.3   MCHC  33.1   RDW  14.1   PLT  178     ELECTROLYTES:  Recent Labs   Lab Test  17   1506   NA  138   POTASSIUM  4.5   CHLORIDE  103   NIKOLAY  9.1   CO2  29   BUN  12   CR  0.88   GLC  130*       Assessment:    Tolerating radiation therapy well.  All questions and concerns addressed.    Plan:   1. Continue current therapy.        Mosaiq chart and setup information reviewed  Ports checked    Medication Review  Med list reviewed with patient?: Yes  Med list printed and given: Offered and declined           Gulshan Shah MD

## 2017-07-06 NOTE — PATIENT INSTRUCTIONS
Please contact [place] Radiation Oncology RN with questions or concerns following today's appointment: [phone].    Thank you!

## 2017-07-07 ENCOUNTER — APPOINTMENT (OUTPATIENT)
Dept: RADIATION ONCOLOGY | Facility: CLINIC | Age: 42
End: 2017-07-07
Payer: COMMERCIAL

## 2017-07-07 PROCEDURE — 77402 RADIATION TX DELIVERY LVL 1: CPT | Performed by: RADIOLOGY

## 2017-07-11 ENCOUNTER — APPOINTMENT (OUTPATIENT)
Dept: RADIATION ONCOLOGY | Facility: CLINIC | Age: 42
End: 2017-07-11
Payer: COMMERCIAL

## 2017-07-11 PROCEDURE — 77402 RADIATION TX DELIVERY LVL 1: CPT | Performed by: RADIOLOGY

## 2017-07-12 ENCOUNTER — APPOINTMENT (OUTPATIENT)
Dept: RADIATION ONCOLOGY | Facility: CLINIC | Age: 42
End: 2017-07-12
Payer: COMMERCIAL

## 2017-07-12 PROCEDURE — 77427 RADIATION TX MANAGEMENT X5: CPT | Performed by: RADIOLOGY

## 2017-07-12 PROCEDURE — 77336 RADIATION PHYSICS CONSULT: CPT | Performed by: RADIOLOGY

## 2017-07-12 PROCEDURE — 77402 RADIATION TX DELIVERY LVL 1: CPT | Performed by: RADIOLOGY

## 2017-07-13 ENCOUNTER — APPOINTMENT (OUTPATIENT)
Dept: RADIATION ONCOLOGY | Facility: CLINIC | Age: 42
End: 2017-07-13
Payer: COMMERCIAL

## 2017-07-13 ENCOUNTER — OFFICE VISIT (OUTPATIENT)
Dept: RADIATION ONCOLOGY | Facility: CLINIC | Age: 42
End: 2017-07-13
Payer: COMMERCIAL

## 2017-07-13 VITALS — BODY MASS INDEX: 23.46 KG/M2 | WEIGHT: 173 LBS

## 2017-07-13 DIAGNOSIS — C90.30 PLASMACYTOMA OF BONE (H): Primary | ICD-10-CM

## 2017-07-13 PROCEDURE — 77417 THER RADIOLOGY PORT IMAGE(S): CPT | Performed by: RADIOLOGY

## 2017-07-13 PROCEDURE — 77402 RADIATION TX DELIVERY LVL 1: CPT | Performed by: RADIOLOGY

## 2017-07-13 PROCEDURE — 99207 ZZC NO BILLABLE SERVICE THIS VISIT: CPT | Performed by: RADIOLOGY

## 2017-07-13 ASSESSMENT — PAIN SCALES - GENERAL: PAINLEVEL: NO PAIN (0)

## 2017-07-13 NOTE — PROGRESS NOTES
[facility] PHYSICIANS  SPECIALIZING IN BREAKTHROUGHS  Radiation Oncology    On Treatment Visit Note      Vince Krueger      Date: 2017   MRN: [id number]   : 1975  Diagnosis: plasmacytoma of right humerus      Reason for Visit:  On Radiation Treatment Visit     Treatment Summary to Date  Treatment Site: right humerus  Current Dose: 1980/4500 cGy Fractions:       Chemotherapy  Chemo concurrent with radx?: No    Subjective:   Overall not feeling well.  He has constant 'blood pressure cuff' sensation over R arm.  He's feeling rundown, and having aches in R shoulder.      Nursing ROS:   Nutrition Alteration  Diet Type: Patient's Preference  Skin  Skin Reaction: 0 - No changes  Skin Intervention: patient reports using aquaphor        Cardiovascular  Respiratory effort: 1 - Normal - without distress        Psychosocial  Mood - Anxiety: 0 - Normal  Mood - Depression: 0 - Normal  Pyschosocial Note: patient reports feeling lethargic over the past week ans is unsure if due to allergies or treatment   Pain Assessment  0-10 Pain Scale: 0  Pain Treatment: patient reports taking Ibuprofen as needed      Objective:   Wt 78.5 kg (173 lb)  BMI 23.46 kg/m2   No change in ROM    Labs:  CBC RESULTS:   Recent Labs   Lab Test  17   1602   WBC  6.3   RBC  4.67   HGB  13.7   HCT  41.4   MCV  89   MCH  29.3   MCHC  33.1   RDW  14.1   PLT  178     ELECTROLYTES:  Recent Labs   Lab Test  17   1506   NA  138   POTASSIUM  4.5   CHLORIDE  103   NIKOLAY  9.1   CO2  29   BUN  12   CR  0.88   GLC  130*       Assessment:    Tolerating radiation therapy well.  All questions and concerns addressed.    Plan:   1. Continue current therapy.    2. Check him next week:  Overall sx may be from allergies, fatigue (working full time and having to use that arm), but obviously myeloma remains a concern.        NVISION MEDICALiq chart and setup information reviewed    Medication Review  Med list reviewed with patient?: Yes            Chel Jasmine MD

## 2017-07-13 NOTE — MR AVS SNAPSHOT
After Visit Summary   7/13/2017    Vince Krueger    MRN: [id number]           Patient Information     Date Of Birth          1975        Visit Information        Provider Department      7/13/2017 8:15 AM Chel Jasmine MD [facility]        Today's Diagnoses     Plasmacytoma of bone (H)    -  1       Follow-ups after your visit        Your next 10 appointments already scheduled     Jul 14, 2017  8:00 AM CDT   TREATMENT with RADIATION THERAPIST   [facility]    [address] [facility] [address]   [phone]            Jul 17, 2017  8:00 AM CDT   TREATMENT with RADIATION THERAPIST   [facility])    [address] [facility] [address]   [phone]            Jul 18, 2017  8:00 AM CDT   TREATMENT with RADIATION THERAPIST   [facility] ([facility])    [address] [facility] [address]   [phone]            Jul 19, 2017  8:00 AM CDT   TREATMENT with RADIATION THERAPIST   [facility] ([facility])    [address] [facility] [address]   [phone]            Jul 20, 2017  8:00 AM CDT   TREATMENT with RADIATION THERAPIST   [facility] ([facility])    [address] [facility] [address]   [phone]            Jul 20, 2017  8:15 AM CDT   on treatment visit with Chel Jasmine MD   [facility])    [address] [facility] [address]   [phone]            Jul 21, 2017  8:00 AM CDT   TREATMENT with RADIATION THERAPIST   [facility])    [address] [facility] [address]   [phone]            Jul 24, 2017  8:00 AM CDT   TREATMENT with RADIATION THERAPIST   [facility]  ([facility])    [address] [facility] [address]   [phone]            Jul 25, 2017  8:00 AM CDT   TREATMENT with RADIATION THERAPIST   [facility] ([facility])    [address] [facility] [address] [phone]            Jul 26, 2017  8:00 AM CDT   TREATMENT with RADIATION THERAPIST   [facility] ([facility])    [address] [address] [phone]              Who to contact     If you have questions or need follow up information about today's clinic visit or your schedule please contact [facility] directly at [phone].  Normal or non-critical lab and imaging results will be communicated to you by Lorehart, letter or phone within 4 business days after the clinic has received the results. If you do not hear from us within 7 days, please contact the clinic through Lorehart or phone. If you have a critical or abnormal lab result, we will notify you by phone as soon as possible.  Submit refill requests through Searchles or call your pharmacy and they will forward the refill request to us. Please allow 3 business days for your refill to be completed.          Additional Information About Your Visit        Searchles Information     Searchles gives you secure access to your electronic health record. If you see a primary care provider, you can also send messages to your care team and make appointments. If you have questions, please call your primary care clinic.  If you do not have a primary care provider, please call [phone] and they will assist you.      Searchles is an electronic gateway that provides easy, online access to your medical records. With Searchles, you can request a clinic appointment, read your test results, renew a prescription or communicate with your care team.     To access your existing account, please contact your [facility] or call [phone] for assistance.        Care EveryWhere ID     This is your Care EveryWhere ID. This could be used by other organizations to access your [place] medical records  [id number]        Your Vitals Were     BMI (Body Mass Index)                   23.46 kg/m2            Blood Pressure from Last 3 Encounters:   06/16/17 108/68   06/01/17 123/82   05/26/17 124/80    Weight from Last 3 Encounters:   07/13/17 78.5 kg (173 lb)   07/06/17 78.9 kg (174 lb)   06/29/17 78 kg (172 lb)              Today, you had the following     No orders found for display       Primary Care Provider Office Phone # Fax #    Tanya MIGUEL ÁNGEL Wen [phone]       [facility] [address] [place] [address]        Equal Access to Services     [facility]: Hadii lois kamara [place], waaxda luqadaha, qaybta kaalmada jamieyajason, leonardo pineda . So [facility] [phone].    ATENCIÓN: Si habla español, tiene a javier disposición servicios gratuitos de asistencia lingüística. Kelvin al [phone].    We comply with applicable federal civil rights laws and [place] laws. We do not discriminate on the basis of race, color, national origin, age, disability sex, sexual orientation or gender identity.            Thank you!     Thank you for choosing [facility]  for your care. Our goal is always to provide you with excellent care. Hearing back from our patients is one way we can continue to improve our services. Please take a few minutes to complete the written survey that you may receive in the mail after your visit with us. Thank you!             Your Updated Medication List - Protect others around you: Learn how to safely use, store and throw away your medicines at www.disposemymeds.org.          This list is accurate as of: 7/13/17  8:36 AM.  Always use your most recent med list.                   Brand Name Dispense Instructions for use Diagnosis     ADVIL PO      Take 200 mg by mouth every 6 hours as needed for moderate pain        loratadine 10 MG tablet    CLARITIN     Take 10 mg by mouth as needed    Lesion of right humerus       MULTIVITAMIN ADULT Chew      Take by mouth daily

## 2017-07-14 ENCOUNTER — APPOINTMENT (OUTPATIENT)
Dept: RADIATION ONCOLOGY | Facility: CLINIC | Age: 42
End: 2017-07-14
Payer: COMMERCIAL

## 2017-07-14 PROCEDURE — 77402 RADIATION TX DELIVERY LVL 1: CPT | Performed by: RADIOLOGY

## 2017-07-17 ENCOUNTER — APPOINTMENT (OUTPATIENT)
Dept: RADIATION ONCOLOGY | Facility: CLINIC | Age: 42
End: 2017-07-17
Payer: COMMERCIAL

## 2017-07-17 PROCEDURE — 77402 RADIATION TX DELIVERY LVL 1: CPT | Performed by: RADIOLOGY

## 2017-07-18 ENCOUNTER — APPOINTMENT (OUTPATIENT)
Dept: RADIATION ONCOLOGY | Facility: CLINIC | Age: 42
End: 2017-07-18
Payer: COMMERCIAL

## 2017-07-18 PROCEDURE — 77402 RADIATION TX DELIVERY LVL 1: CPT | Performed by: RADIOLOGY

## 2017-07-19 ENCOUNTER — APPOINTMENT (OUTPATIENT)
Dept: RADIATION ONCOLOGY | Facility: CLINIC | Age: 42
End: 2017-07-19
Payer: COMMERCIAL

## 2017-07-19 PROCEDURE — 77402 RADIATION TX DELIVERY LVL 1: CPT | Performed by: RADIOLOGY

## 2017-07-19 PROCEDURE — 77427 RADIATION TX MANAGEMENT X5: CPT | Performed by: RADIOLOGY

## 2017-07-19 PROCEDURE — 77336 RADIATION PHYSICS CONSULT: CPT | Performed by: RADIOLOGY

## 2017-07-20 ENCOUNTER — OFFICE VISIT (OUTPATIENT)
Dept: RADIATION ONCOLOGY | Facility: CLINIC | Age: 42
End: 2017-07-20
Payer: COMMERCIAL

## 2017-07-20 ENCOUNTER — APPOINTMENT (OUTPATIENT)
Dept: RADIATION ONCOLOGY | Facility: CLINIC | Age: 42
End: 2017-07-20
Payer: COMMERCIAL

## 2017-07-20 VITALS — BODY MASS INDEX: 23.73 KG/M2 | WEIGHT: 175 LBS

## 2017-07-20 DIAGNOSIS — C90.30 PLASMACYTOMA OF BONE (H): Primary | ICD-10-CM

## 2017-07-20 PROCEDURE — 77417 THER RADIOLOGY PORT IMAGE(S): CPT | Performed by: RADIOLOGY

## 2017-07-20 PROCEDURE — 77402 RADIATION TX DELIVERY LVL 1: CPT | Performed by: RADIOLOGY

## 2017-07-20 PROCEDURE — 99207 ZZC NO BILLABLE SERVICE THIS VISIT: CPT | Performed by: RADIOLOGY

## 2017-07-20 ASSESSMENT — PAIN SCALES - GENERAL: PAINLEVEL: MILD PAIN (3)

## 2017-07-20 NOTE — PROGRESS NOTES
[facility] PHYSICIANS  SPECIALIZING IN BREAKTHROUGHS  Radiation Oncology    On Treatment Visit Note      Vince Krueger      Date: 2017   MRN: [id number]   : 1975  Diagnosis: plasmacytoma of right humerus      Reason for Visit:  On Radiation Treatment Visit     Treatment Summary to Date  Treatment Site: right humerus  Current Dose: 2880/4500 cGy Fractions:       Chemotherapy  Chemo concurrent with radx?: No    Subjective:   He feels a bit better than he did last week, but mentions again that he is having more pain now than he did after the surgery.  The discomfort is primarily in the shoulder and elbow joints. This pain is not severe enough that he requires any medication. It does not wake him at night    Nursing ROS:   Nutrition Alteration  Diet Type: Patient's Preference  Skin  Skin Reaction: 1 - Faint erythema or dry desquamation  Skin Intervention: patient reports using aquaphor                 Psychosocial  Mood - Anxiety: 0 - Normal  Mood - Depression: 0 - Normal  Pyschosocial Note: Patient reports mild fatigue, but stil able to complete all normal activities at work and home  Pain Assessment  0-10 Pain Scale: 3  Pain Descriptors: Sore  Pain Treatment: patient reports taking Ibuprofen as needed      Objective:   Wt 79.4 kg (175 lb)  BMI 23.73 kg/m2   Range of motion is the same.    Labs:  CBC RESULTS:   Recent Labs   Lab Test  17   1602   WBC  6.3   RBC  4.67   HGB  13.7   HCT  41.4   MCV  89   MCH  29.3   MCHC  33.1   RDW  14.1   PLT  178     ELECTROLYTES:  Recent Labs   Lab Test  17   1506   NA  138   POTASSIUM  4.5   CHLORIDE  103   NIKOLAY  9.1   CO2  29   BUN  12   CR  0.88   GLC  130*       Assessment:    Tolerating radiation therapy well.  All questions and concerns addressed.    Plan:   1. Continue current therapy.    2. We will refer to physical therapy.  I think that will help his discomfort and he is very interested in that option.      Mosaiq chart and setup  information reviewed      Medication Review  Med list reviewed with patient?: Yes    Educational Topic Discussed  Additional Instructions: Patient referred for physical therapy.       Chel Jasmine MD

## 2017-07-20 NOTE — PATIENT INSTRUCTIONS
Please contact [facility] RN with questions or concerns following today's appointment: [phone].    Thank you!

## 2017-07-20 NOTE — MR AVS SNAPSHOT
After Visit Summary   7/20/2017    Vince Krueger    MRN: [id number]           Patient Information     Date Of Birth          1975        Visit Information        Provider Department      7/20/2017 8:15 AM Chel Jasmine MD [facility]        Today's Diagnoses     Plasmacytoma of bone (H)    -  1      Care Instructions    Please contact [facility] RN with questions or concerns following today's appointment: [phone].    Thank you!            Follow-ups after your visit        Your next 10 appointments already scheduled     Jul 21, 2017  8:00 AM CDT   TREATMENT with RADIATION THERAPIST   [facility] ([facility]    [address] [facility] [address]   [phone]            Jul 24, 2017  8:00 AM CDT   TREATMENT with RADIATION THERAPIST   [facility] ([facility])    [address] [facility] [address]   [phone]            Jul 25, 2017  8:00 AM CDT   TREATMENT with RADIATION THERAPIST   [facility] ([facility])    [address] [facility] [address]   [phone]            Jul 26, 2017  8:00 AM CDT   TREATMENT with RADIATION THERAPIST   [facility] ([facility])    [address] [facility] [address]   [phone]            Jul 27, 2017  8:00 AM CDT   TREATMENT with RADIATION THERAPIST   [facility] ([facility])    [address] [facility] [address]   [phone]            Jul 27, 2017  8:15 AM CDT   on treatment visit with Chel Jasmine MD   [facility] ([facility])    [address] [facility] [address]   [phone]            Jul 28, 2017  8:00 AM CDT   TREATMENT with RADIATION THERAPIST   [facility] ([facility])     [address] [facility] [address]   [phone]            Jul 31, 2017  8:00 AM CDT   TREATMENT with RAD[facility]ATION THERAPIST   [facility]    [address] [facility] [address]   [phone]            Aug 01, 2017  8:00 AM CDT   TREATMENT with RAD[facility] THERAPIST   [facility]    [address] [facility] [address]   [phone]            Aug 02, 2017  8:00 AM CDT   TREATMENT with RADIATION THERAPIST   [facility]    [address] [place] [address]   [phone]              Who to contact     If you have questions or need follow up information about today's clinic visit or your schedule please contact [facility] directly at [phone].  Normal or non-critical lab and imaging results will be communicated to you by Mobioticshart, letter or phone within 4 business days after the clinic has received the results. If you do not hear from us within 7 days, please contact the clinic through Access Closuret or phone. If you have a critical or abnormal lab result, we will notify you by phone as soon as possible.  Submit refill requests through PostRocket or call your pharmacy and they will forward the refill request to us. Please allow 3 business days for your refill to be completed.          Additional Information About Your Visit        PostRocket Information     PostRocket gives you secure access to your electronic health record. If you see a primary care provider, you can also send messages to your care team and make appointments. If you have questions, please call your primary care clinic.  If you do not have a primary care provider, please call [phone] and they will assist you.      PostRocket is an electronic gateway that provides easy, online access to your medical records. With PostRocket, you can request a clinic  appointment, read your test results, renew a prescription or communicate with your care team.     To access your existing account, please contact your [facility] or call [phone] for assistance.        Care EveryWhere ID     This is your Care EveryWhere ID. This could be used by other organizations to access your [place] medical records  [id number]        Your Vitals Were     BMI (Body Mass Index)                   23.73 kg/m2            Blood Pressure from Last 3 Encounters:   06/16/17 108/68   06/01/17 123/82   05/26/17 124/80    Weight from Last 3 Encounters:   07/20/17 79.4 kg (175 lb)   07/13/17 78.5 kg (173 lb)   07/06/17 78.9 kg (174 lb)              Today, you had the following     No orders found for display       Primary Care Provider Office Phone # Fax #    Tanya CedilloMIGUEL ÁNGEL reynolds [phone]       [facility] [address] [place] [facility] [address]        Equal Access to Services     [facility] San[facility]iumXIAO : Hadii aad ku hadasho Soomaali, waaxda luqadaha, qaybta kaalmada adeegyada, waxay idiin hayroxann pineda . So [facility] [phone].    ATENCIÓN: Si habla español, tiene a javier disposición servicios gratuitos de asistencia lingüística. Llame al [phone].    We comply with applicable federal civil rights laws and [place] laws. We do not discriminate on the basis of race, color, national origin, age, disability sex, sexual orientation or gender identity.            Thank you!     Thank you for choosing [facility]  for your care. Our goal is always to provide you with excellent care. Hearing back from our patients is one way we can continue to improve our services. Please take a few minutes to complete the written survey that you may receive in the mail after your visit with us. Thank you!             Your Updated Medication List - Protect others around you: Learn how to safely use, store and throw away your medicines at  www.disposemymeds.org.          This list is accurate as of: 7/20/17  8:39 AM.  Always use your most recent med list.                   Brand Name Dispense Instructions for use Diagnosis    ADVIL PO      Take 200 mg by mouth every 6 hours as needed for moderate pain        loratadine 10 MG tablet    CLARITIN     Take 10 mg by mouth as needed    Lesion of right humerus       MULTIVITAMIN ADULT Chew      Take by mouth daily

## 2017-07-21 ENCOUNTER — APPOINTMENT (OUTPATIENT)
Dept: RADIATION ONCOLOGY | Facility: CLINIC | Age: 42
End: 2017-07-21
Payer: COMMERCIAL

## 2017-07-21 PROCEDURE — 77402 RADIATION TX DELIVERY LVL 1: CPT | Performed by: RADIOLOGY

## 2017-07-24 ENCOUNTER — APPOINTMENT (OUTPATIENT)
Dept: RADIATION ONCOLOGY | Facility: CLINIC | Age: 42
End: 2017-07-24
Payer: COMMERCIAL

## 2017-07-24 PROCEDURE — 77402 RADIATION TX DELIVERY LVL 1: CPT | Performed by: RADIOLOGY

## 2017-07-25 ENCOUNTER — APPOINTMENT (OUTPATIENT)
Dept: RADIATION ONCOLOGY | Facility: CLINIC | Age: 42
End: 2017-07-25
Payer: COMMERCIAL

## 2017-07-25 PROCEDURE — 77402 RADIATION TX DELIVERY LVL 1: CPT | Performed by: RADIOLOGY

## 2017-07-26 ENCOUNTER — APPOINTMENT (OUTPATIENT)
Dept: RADIATION ONCOLOGY | Facility: CLINIC | Age: 42
End: 2017-07-26
Payer: COMMERCIAL

## 2017-07-26 PROCEDURE — 77402 RADIATION TX DELIVERY LVL 1: CPT | Performed by: RADIOLOGY

## 2017-07-26 PROCEDURE — 77427 RADIATION TX MANAGEMENT X5: CPT | Performed by: RADIOLOGY

## 2017-07-26 PROCEDURE — 77336 RADIATION PHYSICS CONSULT: CPT | Performed by: RADIOLOGY

## 2017-07-27 ENCOUNTER — APPOINTMENT (OUTPATIENT)
Dept: RADIATION ONCOLOGY | Facility: CLINIC | Age: 42
End: 2017-07-27
Payer: COMMERCIAL

## 2017-07-27 ENCOUNTER — OFFICE VISIT (OUTPATIENT)
Dept: RADIATION ONCOLOGY | Facility: CLINIC | Age: 42
End: 2017-07-27
Payer: COMMERCIAL

## 2017-07-27 VITALS — WEIGHT: 174 LBS | BODY MASS INDEX: 23.6 KG/M2

## 2017-07-27 DIAGNOSIS — C90.30 PLASMACYTOMA OF BONE (H): Primary | ICD-10-CM

## 2017-07-27 PROCEDURE — 77417 THER RADIOLOGY PORT IMAGE(S): CPT | Performed by: RADIOLOGY

## 2017-07-27 PROCEDURE — 99207 ZZC NO BILLABLE SERVICE THIS VISIT: CPT | Performed by: RADIOLOGY

## 2017-07-27 PROCEDURE — 77402 RADIATION TX DELIVERY LVL 1: CPT | Performed by: RADIOLOGY

## 2017-07-27 ASSESSMENT — PAIN SCALES - GENERAL: PAINLEVEL: NO PAIN (0)

## 2017-07-27 NOTE — PROGRESS NOTES
[facility] PHYSICIANS  SPECIALIZING IN BREAKTHROUGHS  Radiation Oncology    On Treatment Visit Note      Vince Krueger      Date: 2017   MRN: [id number]   : 1975  Diagnosis: plasmacytoma of right humerus      Reason for Visit:  On Radiation Treatment Visit     Treatment Summary to Date  Treatment Site: right humerus  Current Dose: 3780/4500 cGy Fractions:       Chemotherapy  Chemo concurrent with radx?: No    Subjective:   Still notes fatigue.  He starts PT next week.  He tells me that his metabolic once to see him every 3 months and suggested lab work after he finished the radiation.    Nursing ROS:   Nutrition Alteration  Diet Type: Patient's Preference  Skin  Skin Intervention: patient reports using aquaphor        Cardiovascular  Respiratory effort: 1 - Normal - without distress        Psychosocial  Mood - Anxiety: 0 - Normal  Mood - Depression: 0 - Normal  Pyschosocial Note: Patient reports mild fatigue, but stil able to complete all normal activities at work and home  Pain Assessment  0-10 Pain Scale: 0  Pain Descriptors: Sore  Pain Treatment: patient reports taking Ibuprofen as needed      Objective:   Wt 78.9 kg (174 lb)  BMI 23.6 kg/m2   No change.    Labs:  CBC RESULTS:   Recent Labs   Lab Test  17   1602   WBC  6.3   RBC  4.67   HGB  13.7   HCT  41.4   MCV  89   MCH  29.3   MCHC  33.1   RDW  14.1   PLT  178     ELECTROLYTES:  Recent Labs   Lab Test  17   1506   NA  138   POTASSIUM  4.5   CHLORIDE  103   NIKOLAY  9.1   CO2  29   BUN  12   CR  0.88   GLC  130*       Assessment:    Tolerating radiation therapy well.  All questions and concerns addressed.    Plan:   1. Continue current therapy.    2. I completely agree with close follow up with medical oncology to watch for any changes related to myeloma. Lab work at this time would be beneficial given that he has been so fatigued. I asked Mr. Krueger to go forward with making the appointment with his medical oncologist  this point. Follow up with us will be less important but will be necessary if he's not getting into see his medical oncologist in the near future. He plans to make that connection and this now when his appointment is scheduled with medical oncology.      Mosaiq chart and setup information reviewed    Medication Review  Med list reviewed with patient?: Yes           Chel Jasmine MD

## 2017-07-27 NOTE — MR AVS SNAPSHOT
After Visit Summary   7/27/2017    Vince Krueger    MRN: [id number]           Patient Information     Date Of Birth          1975        Visit Information        Provider Department      7/27/2017 8:15 AM Chel Jasmine MD [facility]        Today's Diagnoses     Plasmacytoma of bone (H)    -  1      Care Instructions    [facility] with questions or concerns following today's appointment: [phone].    Please let us know when you will be following up with your medical oncologist     Thank you!            Follow-ups after your visit        Your next 10 appointments already scheduled     Jul 28, 2017  8:00 AM CDT   TREATMENT with RADIATION THERAPIST   [facility] ([facility])    [address] [facility] [address] [phone]            Jul 31, 2017  8:00 AM CDT   TREATMENT with RADIATION THERAPIST   [facility] ([facility])    [address] [facility] [address] [phone]            Aug 01, 2017  8:00 AM CDT   TREATMENT with RADIATION THERAPIST   [facility] ([facility])    [address] [facility] [address] [phone]            Aug 01, 2017  8:45 AM CDT   Evaluation with Katya Rowley PT   [facility] ([facility]    [address] [facility] [address] [phone]            Aug 02, 2017  8:00 AM CDT   TREATMENT with RADIATION THERAPIST   [facility] ([facility])    [address] [facility] [address] [phone]              Who to contact     If you have questions or need follow up information about today's clinic visit or your schedule please contact [facility] directly at [phone].  Normal or non-critical lab and imaging results will be communicated to  you by MyChart, letter or phone within 4 business days after the clinic has received the results. If you do not hear from us within 7 days, please contact the clinic through Primary Real Estate Solutionst or phone. If you have a critical or abnormal lab result, we will notify you by phone as soon as possible.  Submit refill requests through Communication Science or call your pharmacy and they will forward the refill request to us. Please allow 3 business days for your refill to be completed.          Additional Information About Your Visit        OcutronicsharVanilla Forums Information     Communication Science gives you secure access to your electronic health record. If you see a primary care provider, you can also send messages to your care team and make appointments. If you have questions, please call your primary care clinic.  If you do not have a primary care provider, please call [phone] and they will assist you.      Communication Science is an electronic gateway that provides easy, online access to your medical records. With Communication Science, you can request a clinic appointment, read your test results, renew a prescription or communicate with your care team.     To access your existing account, please contact your [facility] or call [phone] for assistance.        Care EveryWhere ID     This is your Care EveryWhere ID. This could be used by other organizations to access your [place] medical records  [id number]        Your Vitals Were     BMI (Body Mass Index)                   23.6 kg/m2            Blood Pressure from Last 3 Encounters:   06/16/17 108/68   06/01/17 123/82   05/26/17 124/80    Weight from Last 3 Encounters:   07/27/17 78.9 kg (174 lb)   07/20/17 79.4 kg (175 lb)   07/13/17 78.5 kg (173 lb)              Today, you had the following     No orders found for display       Primary Care Provider Office Phone # Fax #    Tanya Wen [phone]       [facility] [address] [place] [address]        Equal Access  to Services     GUY CASTRO : Jory Cruz, wazeke lyle, qaleticiachet watsondrakeleonardo flowers. So [facility] [phone].    ATENCIÓN: Si habla español, tiene a javier disposición servicios gratuitos de asistencia lingüística. Llame al [phone].    We comply with applicable federal civil rights laws and [place] laws. We do not discriminate on the basis of race, color, national origin, age, disability sex, sexual orientation or gender identity.            Thank you!     Thank you for choosing [facility]  for your care. Our goal is always to provide you with excellent care. Hearing back from our patients is one way we can continue to improve our services. Please take a few minutes to complete the written survey that you may receive in the mail after your visit with us. Thank you!             Your Updated Medication List - Protect others around you: Learn how to safely use, store and throw away your medicines at www.disposemymeds.org.          This list is accurate as of: 7/27/17  8:41 AM.  Always use your most recent med list.                   Brand Name Dispense Instructions for use Diagnosis    ADVIL PO      Take 200 mg by mouth every 6 hours as needed for moderate pain        loratadine 10 MG tablet    CLARITIN     Take 10 mg by mouth as needed    Lesion of right humerus       MULTIVITAMIN ADULT Chew      Take by mouth daily

## 2017-07-27 NOTE — PATIENT INSTRUCTIONS
Please contact [facility] RN with questions or concerns following today's appointment: [phone].    Please let us know when you will be following up with your medical oncologist     Thank you!

## 2017-07-28 ENCOUNTER — APPOINTMENT (OUTPATIENT)
Dept: RADIATION ONCOLOGY | Facility: CLINIC | Age: 42
End: 2017-07-28
Payer: COMMERCIAL

## 2017-07-28 PROCEDURE — 77402 RADIATION TX DELIVERY LVL 1: CPT | Performed by: RADIOLOGY

## 2017-07-31 ENCOUNTER — APPOINTMENT (OUTPATIENT)
Dept: RADIATION ONCOLOGY | Facility: CLINIC | Age: 42
End: 2017-07-31
Payer: COMMERCIAL

## 2017-07-31 PROCEDURE — 77402 RADIATION TX DELIVERY LVL 1: CPT | Performed by: RADIOLOGY

## 2017-08-01 ENCOUNTER — APPOINTMENT (OUTPATIENT)
Dept: RADIATION ONCOLOGY | Facility: CLINIC | Age: 42
End: 2017-08-01
Payer: COMMERCIAL

## 2017-08-01 ENCOUNTER — HOSPITAL ENCOUNTER (OUTPATIENT)
Dept: PHYSICAL THERAPY | Facility: CLINIC | Age: 42
Setting detail: THERAPIES SERIES
End: 2017-08-01
Attending: RADIOLOGY
Payer: COMMERCIAL

## 2017-08-01 PROCEDURE — 40000360 ZZHC STATISTIC PT CANCER REHAB VISIT: Performed by: PHYSICAL THERAPIST

## 2017-08-01 PROCEDURE — 97161 PT EVAL LOW COMPLEX 20 MIN: CPT | Mod: GP | Performed by: PHYSICAL THERAPIST

## 2017-08-01 PROCEDURE — 77402 RADIATION TX DELIVERY LVL 1: CPT | Performed by: RADIOLOGY

## 2017-08-01 PROCEDURE — 97110 THERAPEUTIC EXERCISES: CPT | Mod: GP | Performed by: PHYSICAL THERAPIST

## 2017-08-01 ASSESSMENT — 6 MINUTE WALK TEST (6MWT): TOTAL DISTANCE WALKED (METERS): 483

## 2017-08-01 NOTE — PROGRESS NOTES
08/01/17 0800   Quick Adds   Type of Visit Initial OP PT Evaluation   General Information   Start of Care Date 08/01/17   Referring Physician Trina Jasmine MD    Orders Evaluate and Treat as Indicated   Order Date 07/20/17   Medical Diagnosis plasmacytoma of bone    Onset of illness/injury or Date of Surgery 05/19/17   Special Instructions no restrictions for R arm-- but told to avoid painful ROM or lifting motions at this time    Surgical/Medical history reviewed Yes   Pertinent history of current problem (include personal factors and/or comorbidities that impact the POC) Patient with dx of plasmacytoma of the R humerus. He had ORIF on 5/19/17 with Dr. Hudson. He has since then being doing radiation therapy since then but is done with that treatment this week. He did exercises post surgical for lower portion of arm but was not given instruction on overhead motion. 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 He feels his ROM is restricted and he has been having some pain. Ran a 5K over the weekend, feeling fatigue but feels his endurance is okay.    Current Community Support (lives with spouse and two children )   Patient role/Employment history Employed  (run auto body shop )   Living environment [facility]   Patient/Family Goals Statement improve shoulder ROM and strength    General Information Comments finishing up his course of radiation this week    Fall Risk Screen   Fall screen completed by PT   Per patient - Fall 2 or more times in past year? No   Per patient - Fall with injury in past year? No   Is patient a fall risk? No   Fall screen comments steady on his feet- not at falls risk-- did not perfrom TUG as this was not indicated today     System Outcome Measures   Outcome Measures Cancer Rehab   FACIT Fatigue Subscale (score out of 52). The higher the score, the better the QOL. 35   Six Minute Walk (meters). An increase of 70 or more meters indicates statistically  significant change. 483   Pain   Pain comments reports pain at end ROM with active and passive ROM- reports that he will get pain with overhead lifting at work    Cognitive Status Examination   Orientation orientation to person, place and time   Level of Consciousness alert   Follows Commands and Answers Questions 100% of the time   Personal Safety and Judgment intact   Memory intact   Integumentary   Integumentary Comments nothing remarkable- incision near R humerus from surgery    Posture   Posture Comments mild foward shoulder posture with sitting and standing    Range of Motion (ROM)   ROM Quick Adds Shoulder, Right   ROM Comment elbow, neck and wrist ROM within functional limits    Right Shoulder Flexion ROM   Right Shoulder Flexion AROM - degrees 130   Right Shoulder ABduction ROM   Right Shoulder ABduction AROM - degrees 102   Right Shoulder External Rotation ROM   Right Shoulder External Rotation AROM - degrees 65   Right Shoulder Internal Rotation ROM   Right Shoulder Internal Rotation AROM - degrees 60   Strength   Strength Comments 4/5 shoulder flexion and shoulder ABD on R, 4+/5 shoulder IR and ER but with pain, 5/5 elbow flexion/extension    Gait   Gait Comments gait is normal for age-- good foot clearance and step length observed, good balance    Balance   Balance Comments no balance issues noted during session    Planned Therapy Interventions   Planned Therapy Interventions strengthening;stretching;ROM   Clinical Impression   Criteria for Skilled Therapeutic Interventions Met yes, treatment indicated   PT Diagnosis decreased R shoulder ROM and strength    Influenced by the following impairments decreased ROM and strength in UE, pain, soft tissue restrictions, cancer treatment    Functional limitations due to impairments decreased ability to participate fully in profession at auto body shop    Clinical Presentation Stable/Uncomplicated   Clinical Presentation Rationale stable medically, few PT  impairments and co-morbidites    Clinical Decision Making (Complexity) Low complexity   Therapy Frequency other (see comments)  (bi-monthly )   Predicted Duration of Therapy Intervention (days/wks) 90 days   Risk & Benefits of therapy have been explained Yes   Patient, Family & other staff in agreement with plan of care Yes   Clinical Impression Comments Patient presents with restriction motion in R shoulder as well as weakness post R ORIF of humerus as well as undergoing radiation. Patient limited with job abilities at work. Patient will benefit from skilled OP PT to address.    Education Assessment   Preferred Learning Style Listening;Reading;Demonstration   Barriers to Learning No barriers   GOALS   PT Eval Goals 1;2;3   Goal 1   Goal Identifier lifting overhead    Goal Description Patient will report 75% decrease in pain with lifting light objects over head at work in order to more easily perfrom his job.    Target Date 10/29/17   Goal 2   Goal Identifier shoulder ROM   Goal Description Patient will demonstrate >130 degrees of active shoulder ABD and >150 degrees of active shoulder flexion in order to improve his ease of performing functional mobility.    Target Date 10/29/17   Goal 3   Goal Identifier HEP   Goal Description Patient will demonstrate independence with HEP for shoulder ROM and strength to improve his mobility at home and work.    Target Date 10/29/17   Total Evaluation Time   Total Evaluation Time (Minutes) 30

## 2017-08-02 ENCOUNTER — APPOINTMENT (OUTPATIENT)
Dept: RADIATION ONCOLOGY | Facility: CLINIC | Age: 42
End: 2017-08-02
Payer: COMMERCIAL

## 2017-08-02 ENCOUNTER — ONCOLOGY VISIT (OUTPATIENT)
Dept: ADMISSION | Facility: AMBULATORY SURGERY CENTER | Age: 42
End: 2017-08-02

## 2017-08-02 PROCEDURE — 77336 RADIATION PHYSICS CONSULT: CPT | Performed by: RADIOLOGY

## 2017-08-02 PROCEDURE — 77427 RADIATION TX MANAGEMENT X5: CPT | Performed by: RADIOLOGY

## 2017-08-02 PROCEDURE — 77402 RADIATION TX DELIVERY LVL 1: CPT | Performed by: RADIOLOGY

## 2017-08-04 NOTE — PROCEDURES
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     Treatment Summary:  Radiation Oncology - Course: 1   Treatment Site    Dose               Modality From To Elapsed Days Fx.  1 R humerus    4,500 cGy   10 X  6/26/2017  2017  37 25             Dose per Fraction:   180 cGy     Total Dose:  4,500 cGy            COMMENTS:   Patient tolerated treatments very well.  He did have fatigue but not other significant side   effects.                      PAIN MANAGEMENT:     He used ibuprofen for pain as needed.                         FOLLOW UP PLAN:   Patient will follow up with medical oncology over the next few weeks.  He will see us   back as needed.                         Staff Physician: Chel Jasmine M.D.  Physicist: Collette Klein MS                 Radiation Oncology [address] [place] [address] Phone: [phone]

## 2017-08-15 ENCOUNTER — HOSPITAL ENCOUNTER (OUTPATIENT)
Dept: PHYSICAL THERAPY | Facility: CLINIC | Age: 42
Setting detail: THERAPIES SERIES
End: 2017-08-15
Attending: RADIOLOGY
Payer: COMMERCIAL

## 2017-08-15 PROCEDURE — 97110 THERAPEUTIC EXERCISES: CPT | Mod: GP | Performed by: PHYSICAL THERAPIST

## 2017-08-15 PROCEDURE — 40000360 ZZHC STATISTIC PT CANCER REHAB VISIT: Performed by: PHYSICAL THERAPIST

## 2017-09-23 NOTE — PROGRESS NOTES
Attending MD (Dr. John Hudson) :  This patient was seen and evaluated by me including a history, exam, and interpretation of all imaging and/or lab data.  A training physician (resident/fellow), who also saw the patient, has documented the clinic visit in the attached note using voice recognition dictation software, as such, transcription errors may be present.    MD Rai Eduardo Family Professor  Oncology and Adult Reconstructive Surgery  Dept Orthopaedic Surgery, [facility]  [phone] office, [phone] pager  [email or web address]

## 2017-09-27 ENCOUNTER — OFFICE VISIT (OUTPATIENT)
Dept: TRANSPLANT | Facility: CLINIC | Age: 42
End: 2017-09-27
Attending: INTERNAL MEDICINE
Payer: COMMERCIAL

## 2017-09-27 VITALS
SYSTOLIC BLOOD PRESSURE: 114 MMHG | RESPIRATION RATE: 16 BRPM | HEART RATE: 73 BPM | TEMPERATURE: 98.6 F | DIASTOLIC BLOOD PRESSURE: 74 MMHG | WEIGHT: 178.3 LBS | OXYGEN SATURATION: 98 % | BODY MASS INDEX: 24.18 KG/M2

## 2017-09-27 DIAGNOSIS — C90.30 PLASMACYTOMA OF BONE (H): ICD-10-CM

## 2017-09-27 DIAGNOSIS — M79.621 PAIN OF RIGHT UPPER ARM: ICD-10-CM

## 2017-09-27 DIAGNOSIS — C90.30 PLASMACYTOMA OF BONE (H): Primary | ICD-10-CM

## 2017-09-27 LAB
ALBUMIN SERPL-MCNC: 3.7 G/DL (ref 3.4–5)
ALP SERPL-CCNC: 68 U/L (ref 40–150)
ALT SERPL W P-5'-P-CCNC: 31 U/L (ref 0–70)
ANION GAP SERPL CALCULATED.3IONS-SCNC: 3 MMOL/L (ref 3–14)
AST SERPL W P-5'-P-CCNC: 19 U/L (ref 0–45)
BASOPHILS # BLD AUTO: 0 10E9/L (ref 0–0.2)
BASOPHILS NFR BLD AUTO: 0.3 %
BILIRUB SERPL-MCNC: 0.3 MG/DL (ref 0.2–1.3)
BUN SERPL-MCNC: 12 MG/DL (ref 7–30)
CALCIUM SERPL-MCNC: 8.5 MG/DL (ref 8.5–10.1)
CHLORIDE SERPL-SCNC: 104 MMOL/L (ref 94–109)
CO2 SERPL-SCNC: 30 MMOL/L (ref 20–32)
CREAT SERPL-MCNC: 1.08 MG/DL (ref 0.66–1.25)
DIFFERENTIAL METHOD BLD: NORMAL
EOSINOPHIL # BLD AUTO: 0.1 10E9/L (ref 0–0.7)
EOSINOPHIL NFR BLD AUTO: 1.7 %
ERYTHROCYTE [DISTWIDTH] IN BLOOD BY AUTOMATED COUNT: 13.6 % (ref 10–15)
GFR SERPL CREATININE-BSD FRML MDRD: 74 ML/MIN/1.7M2
GLUCOSE SERPL-MCNC: 109 MG/DL (ref 70–99)
HCT VFR BLD AUTO: 47.9 % (ref 40–53)
HGB BLD-MCNC: 15.6 G/DL (ref 13.3–17.7)
IMM GRANULOCYTES # BLD: 0 10E9/L (ref 0–0.4)
IMM GRANULOCYTES NFR BLD: 0.2 %
LYMPHOCYTES # BLD AUTO: 1.3 10E9/L (ref 0.8–5.3)
LYMPHOCYTES NFR BLD AUTO: 22.1 %
MCH RBC QN AUTO: 28.5 PG (ref 26.5–33)
MCHC RBC AUTO-ENTMCNC: 32.6 G/DL (ref 31.5–36.5)
MCV RBC AUTO: 87 FL (ref 78–100)
MONOCYTES # BLD AUTO: 0.6 10E9/L (ref 0–1.3)
MONOCYTES NFR BLD AUTO: 10 %
NEUTROPHILS # BLD AUTO: 3.8 10E9/L (ref 1.6–8.3)
NEUTROPHILS NFR BLD AUTO: 65.7 %
NRBC # BLD AUTO: 0 10*3/UL
NRBC BLD AUTO-RTO: 0 /100
PLATELET # BLD AUTO: 202 10E9/L (ref 150–450)
POTASSIUM SERPL-SCNC: 4.4 MMOL/L (ref 3.4–5.3)
PROT SERPL-MCNC: 7.5 G/DL (ref 6.8–8.8)
RBC # BLD AUTO: 5.48 10E12/L (ref 4.4–5.9)
SODIUM SERPL-SCNC: 137 MMOL/L (ref 133–144)
WBC # BLD AUTO: 5.8 10E9/L (ref 4–11)

## 2017-09-27 PROCEDURE — 82784 ASSAY IGA/IGD/IGG/IGM EACH: CPT | Performed by: INTERNAL MEDICINE

## 2017-09-27 PROCEDURE — 90686 IIV4 VACC NO PRSV 0.5 ML IM: CPT | Mod: ZF | Performed by: INTERNAL MEDICINE

## 2017-09-27 PROCEDURE — 00000402 ZZHCL STATISTIC TOTAL PROTEIN: Performed by: INTERNAL MEDICINE

## 2017-09-27 PROCEDURE — 86334 IMMUNOFIX E-PHORESIS SERUM: CPT | Performed by: INTERNAL MEDICINE

## 2017-09-27 PROCEDURE — 84165 PROTEIN E-PHORESIS SERUM: CPT | Performed by: INTERNAL MEDICINE

## 2017-09-27 PROCEDURE — G0008 ADMIN INFLUENZA VIRUS VAC: HCPCS

## 2017-09-27 PROCEDURE — 85025 COMPLETE CBC W/AUTO DIFF WBC: CPT | Performed by: INTERNAL MEDICINE

## 2017-09-27 PROCEDURE — 99212 OFFICE O/P EST SF 10 MIN: CPT

## 2017-09-27 PROCEDURE — 80053 COMPREHEN METABOLIC PANEL: CPT | Performed by: INTERNAL MEDICINE

## 2017-09-27 PROCEDURE — 83883 ASSAY NEPHELOMETRY NOT SPEC: CPT | Performed by: INTERNAL MEDICINE

## 2017-09-27 PROCEDURE — 25000128 H RX IP 250 OP 636: Mod: ZF | Performed by: INTERNAL MEDICINE

## 2017-09-27 PROCEDURE — 36415 COLL VENOUS BLD VENIPUNCTURE: CPT

## 2017-09-27 RX ADMIN — INFLUENZA A VIRUS A/MICHIGAN/45/2015 X-275 (H1N1) ANTIGEN (FORMALDEHYDE INACTIVATED), INFLUENZA A VIRUS A/HONG KONG/4801/2014 X-263B (H3N2) ANTIGEN (FORMALDEHYDE INACTIVATED), INFLUENZA B VIRUS B/PHUKET/3073/2013 ANTIGEN (FORMALDEHYDE INACTIVATED), AND INFLUENZA B VIRUS B/BRISBANE/60/2008 ANTIGEN (FORMALDEHYDE INACTIVATED) 0.5 ML: 15; 15; 15; 15 INJECTION, SUSPENSION INTRAMUSCULAR at 17:10

## 2017-09-27 ASSESSMENT — PAIN SCALES - GENERAL: PAINLEVEL: NO PAIN (0)

## 2017-09-27 NOTE — NURSING NOTE
Chief Complaint   Patient presents with     Blood Draw     Labs drawn by RN from VPT. VS taken.      BABAR JENSEN RN

## 2017-09-27 NOTE — NURSING NOTE
Vince Krueger      1.  Has the patient received the information for the influenza vaccine? YES    2.  Does the patient have any of the following contraindications?     Allergy to eggs?  No     Allergic reaction to previous influenza vaccines?  No     Any other problems to previous influenza vaccines?  No     Paralyzed by Guillain-[place] syndrome?  No     Currently pregnant? NO     Current moderate or severe illness?  No     Allergy to contact lens solution?  No        Vaccination given by Amol Mcghee.  Recorded by Amol Mcghee    Injection given left deltoid without incident.

## 2017-09-27 NOTE — LETTER
RE: Vince Krueger  [address] [facility] [address]       HEMATOLOGY AND ONCOLOGY    RETURN PATIENT VISIT    NAME: Vince Krueger ARACELI: Sep 27, 2017   MRN: [id number]      REFERRING PHYSICIAN: John Hudson  Diagnosis: R humeral solitary plasmacytoma; presented with multiple month history of R arm pain  Treatment: s/p surgery ORIF 5/19/2017 by Piero Hudson  - XRT to 4500 cGy by Trina Jasmine MD at [facility] Onc with good tolerance.       HPI/ROS:     Feeling well. Still notes some mild ROM limitation and pain over the inner/upper R shoulder.  Has felt well otherwise with no intercurrent illnesses.  Has had a few more episodes of hypoglycemia.  States he just doesn't feel hungry a lot nor know exactly when he should eat given his lack of hunger. No new bone pains anywhere. Rest of a 10 point ROS was negative today.            Physical Exam:     /74  Pulse 73  Temp 98.6  F (37  C)  Resp 16  Wt 80.9 kg (178 lb 4.8 oz)  SpO2 98%  BMI 24.18 kg/m2   ECOG:    Wt Readings from Last 5 Encounters:   09/27/17 80.9 kg (178 lb 4.8 oz)   07/27/17 78.9 kg (174 lb)   07/20/17 79.4 kg (175 lb)   07/13/17 78.5 kg (173 lb)   07/06/17 78.9 kg (174 lb)     General:  no acute distress. HEENT: no cervical LAD  Skin: No concerning lesions or rash on exposed surfaces. Incision site approximately 6 inches in length R arm. C/D/I  HEENT: MMM  Respiratory: clear to  auscultation bilaterally.   Cardiovascular: Regular rate and rhythm. No murmur or rub.   Abdomen: Normoactive bowel sounds. Abdomen soft, non-distended, and non-tender. No palpable masses or organomegaly.  Extremities: no edema.  Neurologic: alert, conversing appropriately. Grossly non-focal.   Psychiatric: Mentation and affect appear normal.  Musculoskeletal: has limited ROM actively in R arm           Data:   CBC RESULTS:   Recent Labs   Lab Test  09/27/17   1610   WBC  5.8   RBC  5.48   HGB  15.6   HCT  47.9   MCV  87   MCH  28.5   MCHC  32.6    RDW  13.6   PLT  202     Recent Labs   Lab Test  09/27/17   1610  05/11/17   1506   NA  137  138   POTASSIUM  4.4  4.5   CHLORIDE  104  103   CO2  30  29   ANIONGAP  3  6   GLC  109*  130*   BUN  12  12   CR  1.08  0.88   NIKOLAY  8.5  9.1     Liver Function Studies -   Recent Labs   Lab Test  09/27/17   1610   PROTTOTAL  7.5   ALBUMIN  3.7   BILITOTAL  0.3   ALKPHOS  68   AST  19   ALT  31        5/4/2017 10:54 5/17/2017 18:49 6/26/2017 16:02 9/27/2017 16:10   IGA  249  258   IGG  2240 (H) 1410 1240   IGM  97  101   [place] Free Lt Chain 5.13 (H)   0.92   Kappa Lambda Ratio 2.25 (H)   0.53   Lambda Free Lt Chain 2.28   1.72   Monoclonal Peak 1.6 (H)  0.7 (H) 0.2 (H)     R arm XR: Stable postsurgical change of plate and screw fixation and methylmethacrylate packing of right humeral shaft lesion. Faint mineralization anterior aspect of the right arm approximately 10 cm below the shoulder joint, new since prior but may be projectional difference and likely representing heterotopic ossification.         Assessment :     Solitary plasmacytoma of R humerus         Plan:     Things are improved on my end of things. Still has detectible M-spike but it is much improved after surgery and XRT. Will see patient again in 3 months for f/up. As long as things remain good, he shoul dbe able to go to q3-6 months perhaps after that. Will give Flu shot today. Encouraged him to f/up with GI surgeon vs. Endo re: these episodes of hypoglycemia which I believe to be resulted from his previous surgery. INdicated his understanding of the above.      Andre Panda, DO

## 2017-09-27 NOTE — PROGRESS NOTES
HEMATOLOGY AND ONCOLOGY    RETURN PATIENT VISIT    NAME: Vince Krueger ARACELI: Sep 27, 2017   MRN: [id number]      REFERRING PHYSICIAN: John Hudson  Diagnosis: R humeral solitary plasmacytoma; presented with multiple month history of R arm pain  Treatment: s/p surgery ORIF 5/19/2017 by Piero Hudson  - XRT to 4500 cGy by Trina Jasmine MD at [facility] Onc with good tolerance.       HPI/ROS:     Feeling well. Still notes some mild ROM limitation and pain over the inner/upper R shoulder.  Has felt well otherwise with no intercurrent illnesses.  Has had a few more episodes of hypoglycemia.  States he just doesn't feel hungry a lot nor know exactly when he should eat given his lack of hunger. No new bone pains anywhere. Rest of a 10 point ROS was negative today.            Physical Exam:     /74  Pulse 73  Temp 98.6  F (37  C)  Resp 16  Wt 80.9 kg (178 lb 4.8 oz)  SpO2 98%  BMI 24.18 kg/m2   ECOG:    Wt Readings from Last 5 Encounters:   09/27/17 80.9 kg (178 lb 4.8 oz)   07/27/17 78.9 kg (174 lb)   07/20/17 79.4 kg (175 lb)   07/13/17 78.5 kg (173 lb)   07/06/17 78.9 kg (174 lb)     General:  no acute distress. HEENT: no cervical LAD  Skin: No concerning lesions or rash on exposed surfaces. Incision site approximately 6 inches in length R arm. C/D/I  HEENT: MMM  Respiratory: clear to  auscultation bilaterally.   Cardiovascular: Regular rate and rhythm. No murmur or rub.   Abdomen: Normoactive bowel sounds. Abdomen soft, non-distended, and non-tender. No palpable masses or organomegaly.  Extremities: no edema.  Neurologic: alert, conversing appropriately. Grossly non-focal.   Psychiatric: Mentation and affect appear normal.  Musculoskeletal: has limited ROM actively in R arm           Data:   CBC RESULTS:   Recent Labs   Lab Test  09/27/17   1610   WBC  5.8   RBC  5.48   HGB  15.6   HCT  47.9   MCV  87   MCH  28.5   MCHC  32.6   RDW  13.6   PLT  202     Recent Labs   Lab Test  09/27/17   1610   05/11/17   1506   NA  137  138   POTASSIUM  4.4  4.5   CHLORIDE  104  103   CO2  30  29   ANIONGAP  3  6   GLC  109*  130*   BUN  12  12   CR  1.08  0.88   NIKOLAY  8.5  9.1     Liver Function Studies -   Recent Labs   Lab Test  09/27/17   1610   PROTTOTAL  7.5   ALBUMIN  3.7   BILITOTAL  0.3   ALKPHOS  68   AST  19   ALT  31        5/4/2017 10:54 5/17/2017 18:49 6/26/2017 16:02 9/27/2017 16:10   IGA  249  258   IGG  2240 (H) 1410 1240   IGM  97  101   [place] Free Lt Chain 5.13 (H)   0.92   Kappa Lambda Ratio 2.25 (H)   0.53   Lambda Free Lt Chain 2.28   1.72   Monoclonal Peak 1.6 (H)  0.7 (H) 0.2 (H)     R arm XR: Stable postsurgical change of plate and screw fixation and methylmethacrylate packing of right humeral shaft lesion. Faint mineralization anterior aspect of the right arm approximately 10 cm below the shoulder joint, new since prior but may be projectional difference and likely representing heterotopic ossification.         Assessment :     Solitary plasmacytoma of R humerus         Plan:     Things are improved on my end of things. Still has detectible M-spike but it is much improved after surgery and XRT. Will see patient again in 3 months for f/up. As long as things remain good, he shoul dbe able to go to q3-6 months perhaps after that. Will give Flu shot today. Encouraged him to f/up with GI surgeon vs. Endo re: these episodes of hypoglycemia which I believe to be resulted from his previous surgery. INdicated his understanding of the above.      Andre Panda, DO

## 2017-09-27 NOTE — NURSING NOTE
Oncology Rooming Note    September 27, 2017 4:28 PM   Vince Krueger is a 42 year old male who presents for:    Chief Complaint   Patient presents with     Blood Draw     Labs drawn by RN from T. VS taken.      Oncology Clinic Visit     Pt is here for  Plasmacytoma of bone      Initial Vitals: /74  Pulse 73  Temp 98.6  F (37  C)  Resp 16  Wt 80.9 kg (178 lb 4.8 oz)  SpO2 98%  BMI 24.18 kg/m2 Estimated body mass index is 24.18 kg/(m^2) as calculated from the following:    Height as of 6/16/17: 1.829 m (6').    Weight as of this encounter: 80.9 kg (178 lb 4.8 oz). Body surface area is 2.03 meters squared.  No Pain (0) Comment: Data Unavailable   No LMP for male patient.  Allergies reviewed: Yes  Medications reviewed: Yes    Medications: Medication refills not needed today.  Pharmacy name entered into EPIC: [id number] - [address]    Clinical concerns: none     6 minutes for nursing intake (face to face time)     Caitie Macias MA

## 2017-09-27 NOTE — MR AVS SNAPSHOT
After Visit Summary   9/27/2017    Vince Krueger    MRN: [id number]           Patient Information     Date Of Birth          1975        Visit Information        Provider Department      9/27/2017 4:30 PM Andre Panda MD [facility] Blood and Marrow Transplant        Today's Diagnoses     Plasmacytoma of bone (H)    -  1    Pain of right upper arm              [facility] ([facility]  [address] [place] [address]  Phone: [phone]  Clinic Hours:   Monday-Thursday:7am to 7pm   Friday: 7am to 5pm   Weekends and holidays:    8am to noon (in general)  If your fever is 100.5  or greater,   call the clinic.  After hours call the   hospital at [phone] or   [phone]. Ask for the BMT   fellow on-call            Follow-ups after your visit        Your next 10 appointments already scheduled     Sep 27, 2017  5:30 PM CDT   XR HUMERUS RIGHT G/E 2 VIEWS with UCXR1   [facility] Xray ([facility])    [address] [place] [address] [phone]           Please bring a list of your current medicines to your exam. (Include vitamins, minerals and over-thecounter medicines.) Leave your valuables at home.  Tell your doctor if there is a chance you may be pregnant.  You do not need to do anything special for this exam.            Dec 27, 2017  3:30 PM CST   [facility] Lab Draw with  [facility] LAB DRAW   [facility] Draw ([facility])    [address] [place] [address] [phone]            Dec 27, 2017  4:00 PM CST   RETURN ONC with Andre Panda MD   [facility] and Marrow Transplant ([facility])    [address] [place] [address] [phone]              Future tests that were ordered for you today     Open Future Orders        Priority Expected Expires Ordered    XR Humerus Right G/E 2  Views Routine 9/27/2017 9/27/2018 9/27/2017            Who to contact     If you have questions or need follow up information about today's clinic visit or your schedule please contact [facility] BLOOD AND MARROW TRANSPLANT directly at [phone].  Normal or non-critical lab and imaging results will be communicated to you by KeVitahart, letter or phone within 4 business days after the clinic has received the results. If you do not hear from us within 7 days, please contact the clinic through 5Rockst or phone. If you have a critical or abnormal lab result, we will notify you by phone as soon as possible.  Submit refill requests through Technical Machine or call your pharmacy and they will forward the refill request to us. Please allow 3 business days for your refill to be completed.          Additional Information About Your Visit        Technical Machine Information     Technical Machine gives you secure access to your electronic health record. If you see a primary care provider, you can also send messages to your care team and make appointments. If you have questions, please call your primary care clinic.  If you do not have a primary care provider, please call [phone] and they will assist you.        Care EveryWhere ID     This is your Care EveryWhere ID. This could be used by other organizations to access your [place] medical records  [id number]        Your Vitals Were     Pulse Temperature Respirations Pulse Oximetry BMI (Body Mass Index)       73 98.6  F (37  C) 16 98% 24.18 kg/m2        Blood Pressure from Last 3 Encounters:   09/27/17 114/74   06/16/17 108/68   06/01/17 123/82    Weight from Last 3 Encounters:   09/27/17 80.9 kg (178 lb 4.8 oz)   07/27/17 78.9 kg (174 lb)   07/20/17 79.4 kg (175 lb)               Recent Review Flowsheet Data     BMT Recent Results Latest Ref Rng & Units 5/4/2017 5/11/2017 5/19/2017 5/20/2017 6/1/2017 6/26/2017 9/27/2017    WBC 4.0 - 11.0 10e9/L 4.8 - 11.4(H) - 6.9 6.3 5.8    Hemoglobin 13.3 - 17.7  g/dL 16.0 - 11.8(L) 11.6(L) 13.7 13.7 15.6    Platelet Count 150 - 450 10e9/L 244 - 186 - 261 178 202    Neutrophils (Absolute) 1.6 - 8.3 10e9/L - - - - 4.8 4.1 3.8    INR 0.86 - 1.14 - - 1.03 - - - -    Sodium 133 - 144 mmol/L - 138 - - - - 137    Potassium 3.4 - 5.3 mmol/L - 4.5 - - - - 4.4    Chloride 94 - 109 mmol/L - 103 - - - - 104    Glucose 70 - 99 mg/dL - 130(H) - - 99 - 109(H)    Urea Nitrogen 7 - 30 mg/dL 16 12 - - - - 12    Creatinine 0.66 - 1.25 mg/dL 0.96 0.88 - - - - 1.08    Calcium (Total) 8.5 - 10.1 mg/dL 9.5 9.1 - - - - 8.5    Protein (Total) 6.8 - 8.8 g/dL - 8.5 - - - - 7.5    Albumin 3.4 - 5.0 g/dL - 3.9 - - - - 3.7    Alkaline Phosphatase 40 - 150 U/L 63 56 - - - - 68    AST 0 - 45 U/L - 13 - - - - 19    ALT 0 - 70 U/L - 21 - - - - 31    MCV 78 - 100 fl 90 - 89 - 87 89 87               Primary Care Provider Office Phone # Fax #    Tanya Cedillorodolfo, MIGUEL ÁNGEL [phone]       [facility] [address] KARINA VASQUEZ [facility] [address]        Equal Access to Services     [facility] AH: Hadii lois moffett hadasho Soomaali, waaxda luqadaha, qaybta kaalmada sulaiman, waxay alessandra garcia. So [facility] [phone].    ATENCIÓN: Si habla español, tiene a javier disposición servicios gratuitos de asistencia lingüística. Naelame al [phone].    We comply with applicable federal civil rights laws and [place] laws. We do not discriminate on the basis of race, color, national origin, age, disability sex, sexual orientation or gender identity.            Thank you!     Thank you for choosing [facility] BLOOD AND MARROW TRANSPLANT  for your care. Our goal is always to provide you with excellent care. Hearing back from our patients is one way we can continue to improve our services. Please take a few minutes to complete the written survey that you may receive in the mail after your visit with us. Thank you!             Your Updated Medication List - Protect others around you: Learn how to  safely use, store and throw away your medicines at www.disposemymeds.org.          This list is accurate as of: 9/27/17  5:22 PM.  Always use your most recent med list.                   Brand Name Dispense Instructions for use Diagnosis    ADVIL PO      Take 200 mg by mouth every 6 hours as needed for moderate pain        loratadine 10 MG tablet    CLARITIN     Take 10 mg by mouth as needed    Lesion of right humerus       MULTIVITAMIN ADULT Chew      Take by mouth daily

## 2017-09-28 LAB
ALBUMIN SERPL ELPH-MCNC: 4.4 G/DL (ref 3.7–5.1)
ALPHA1 GLOB SERPL ELPH-MCNC: 0.3 G/DL (ref 0.2–0.4)
ALPHA2 GLOB SERPL ELPH-MCNC: 0.6 G/DL (ref 0.5–0.9)
B-GLOBULIN SERPL ELPH-MCNC: 0.8 G/DL (ref 0.6–1)
GAMMA GLOB SERPL ELPH-MCNC: 1.3 G/DL (ref 0.7–1.6)
IGA SERPL-MCNC: 258 MG/DL (ref 70–380)
IGG SERPL-MCNC: 1240 MG/DL (ref 695–1620)
IGM SERPL-MCNC: 101 MG/DL (ref 60–265)
KAPPA LC UR-MCNC: 0.92 MG/DL (ref 0.33–1.94)
KAPPA LC/LAMBDA SER: 0.53 {RATIO} (ref 0.26–1.65)
LAMBDA LC SERPL-MCNC: 1.72 MG/DL (ref 0.57–2.63)
M PROTEIN SERPL ELPH-MCNC: 0.2 G/DL
PROT PATTERN SERPL ELPH-IMP: ABNORMAL
PROT PATTERN SERPL IFE-IMP: NORMAL

## 2017-11-21 ENCOUNTER — TRANSFERRED RECORDS (OUTPATIENT)
Dept: HEALTH INFORMATION MANAGEMENT | Facility: CLINIC | Age: 42
End: 2017-11-21

## 2017-11-27 ENCOUNTER — MEDICAL CORRESPONDENCE (OUTPATIENT)
Dept: HEALTH INFORMATION MANAGEMENT | Facility: CLINIC | Age: 42
End: 2017-11-27

## 2017-11-28 ENCOUNTER — DOCUMENTATION ONLY (OUTPATIENT)
Dept: GASTROENTEROLOGY | Facility: CLINIC | Age: 42
End: 2017-11-28

## 2017-11-28 NOTE — PROGRESS NOTES
GI notes or primary provider notes related to GI problem   Y    Pathology reports N    Recent Lab  Reports N    Radiology Reports (CT?MRI) N    Endoscopy N    Colonoscopy N    Referring GI Physician Name     Referring PCP Name Tanya [facility]    Notes:gerd    Referral Date 11/27/17    Date Complete Records Received 11/27/17    Date records scanned into [facility] 11/28/17     Provider Review Date     Date review routed back to [facility]     Letter sent       Notes

## 2017-12-11 DIAGNOSIS — S42.309A HUMERUS FRACTURE: Primary | ICD-10-CM

## 2017-12-12 NOTE — ADDENDUM NOTE
Encounter addended by: Katya Rowley, PT on: 12/12/2017  4:29 PM<BR>     Actions taken: Sign clinical note, Episode resolved

## 2017-12-12 NOTE — PROGRESS NOTES
Outpatient Physical Therapy Discharge Note     Patient: Vince Krueger  : 1975    Beginning/End Dates of Reporting Period:  17 to 8/15/17    Referring Provider: Trina Jasmine MD    Therapy Diagnosis: decreased R shoulder ROM and strength     Client Self Report: Patient reports that he feels his shoulder is moving better. Is feeling some pain in triceps on R arm- thinks it is side effect of radiation as his skin is a lot more irritated now in the back. Has been doing his stretches most days- does get some pain with sleeper stretch.     Objective Measurements:  Objective Measure: R shoulder AROM  Details: ABD: 126, Flexion: 142, IR: 60, ER, 60       Goals:  Goal Identifier lifting overhead    Goal Description Patient will report 75% decrease in pain with lifting light objects over head at work in order to more easily perfrom his job.    Target Date 10/29/17   Date Met      Progress: was progressing toward this goal     Goal Identifier shoulder ROM   Goal Description Patient will demonstrate >130 degrees of active shoulder ABD and >150 degrees of active shoulder flexion in order to improve his ease of performing functional mobility.    Target Date 10/29/17   Date Met      Progress: was progressing toward this goal     Goal Identifier HEP   Goal Description Patient will demonstrate independence with HEP for shoulder ROM and strength to improve his mobility at home and work.    Target Date 10/29/17   Date Met      Progress: was progressing toward this goal     Progress Toward Goals:   Patient chose to discontinue therapy- sent message to therapist stating that things were improving and his last scheduled appt was noted needed. Unable to formally re-assess his goals but anticipate that he was making progress if not met them. Home exercise program to be continued.      Plan:  Discharge from therapy.    Discharge:    Reason for Discharge: Patient chooses to discontinue therapy.    Equipment Issued:  none    Discharge Plan: Patient to continue home program.

## 2017-12-14 ENCOUNTER — RADIANT APPOINTMENT (OUTPATIENT)
Dept: GENERAL RADIOLOGY | Facility: CLINIC | Age: 42
End: 2017-12-14
Attending: ORTHOPAEDIC SURGERY
Payer: COMMERCIAL

## 2017-12-14 ENCOUNTER — DOCUMENTATION ONLY (OUTPATIENT)
Dept: GASTROENTEROLOGY | Facility: CLINIC | Age: 42
End: 2017-12-14

## 2017-12-14 ENCOUNTER — OFFICE VISIT (OUTPATIENT)
Dept: ORTHOPEDICS | Facility: CLINIC | Age: 42
End: 2017-12-14
Payer: COMMERCIAL

## 2017-12-14 VITALS — BODY MASS INDEX: 24.73 KG/M2 | HEIGHT: 72 IN | WEIGHT: 182.6 LBS

## 2017-12-14 DIAGNOSIS — C90.30 PLASMACYTOMA OF BONE (H): Primary | ICD-10-CM

## 2017-12-14 DIAGNOSIS — S42.309A HUMERUS FRACTURE: ICD-10-CM

## 2017-12-14 RX ORDER — NICOTINE POLACRILEX 4 MG/1
20 GUM, CHEWING ORAL
COMMUNITY
Start: 2017-11-21 | End: 2021-02-04

## 2017-12-14 ASSESSMENT — ENCOUNTER SYMPTOMS
INSOMNIA: 0
NIGHT SWEATS: 0
HYPOTENSION: 0
BLOATING: 0
NERVOUS/ANXIOUS: 1
DIARRHEA: 1
INCREASED ENERGY: 1
DECREASED APPETITE: 0
DECREASED CONCENTRATION: 0
HYPERTENSION: 0
FATIGUE: 1
ORTHOPNEA: 0
EXERCISE INTOLERANCE: 0
ABDOMINAL PAIN: 0
SLEEP DISTURBANCES DUE TO BREATHING: 0
JOINT SWELLING: 0
POLYDIPSIA: 0
FEVER: 0
BACK PAIN: 1
PANIC: 0
WEIGHT GAIN: 0
STIFFNESS: 1
BLOOD IN STOOL: 0
POLYPHAGIA: 0
BOWEL INCONTINENCE: 0
MUSCLE CRAMPS: 0
PALPITATIONS: 1
SYNCOPE: 0
VOMITING: 0
DEPRESSION: 0
LIGHT-HEADEDNESS: 0
MUSCLE WEAKNESS: 0
ARTHRALGIAS: 1
LEG PAIN: 0
WEIGHT LOSS: 0
ALTERED TEMPERATURE REGULATION: 0
RECTAL PAIN: 0
NAUSEA: 1
HALLUCINATIONS: 0
JAUNDICE: 0
HEARTBURN: 1
CHILLS: 0
NECK PAIN: 1
CONSTIPATION: 0
MYALGIAS: 1

## 2017-12-14 NOTE — LETTER
12/14/2017       RE: iVnce Krueger  [address]  [facility] [address]     Dear Colleague,    Thank you for referring your patient, Vince Krueger, to the [facility] ORTHOPAEDIC [facility]IC at [facility]. Please see a copy of my visit note below.      Orthopaedic Oncology clinic visit - TOÑO Hudson MD    Requesting physician: Georgi Hammond MD [facility]  Dr. Marlon Pulliam, [facility]  Dr. Radames Alvares [facility]  Tanya Wen NP primary care, [facility], [facility]     Diagnosis:  1. 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 5/19/2017, biopsy, tumor excision, ORIF with zometa impregnated cement ([facility]) [facility]      Interval History:  Doing well post-op although he has some discomfort in. He is right-hand dominant. He is having some mid thoracic back pain which is been progressive over the past couple months. It bothers him at nighttime and awakened from sleep. No neurologic symptoms in the left upper extremity. No neurologic symptoms in lower extremities    EXAM: incision healed.  Radial, median, ulnar nn intact motor function  Elbow -5-110 deg arc of motion  Forearm 90/90 sup and pronation  Shoulder forward flexion 170 . Abduction 160 .    XR: intact fixation. Some lucencies noted and slightly larger than prior radiographs. No significant ostial lysis about the implant.        Plan:  1. Obtain MRI examination of cervical thoracic spine to look for evidence of disease in this location given his T-spine symptoms.  I've asked to review findings with his upcoming appointment with Dr. Panda in 2 weeks.  2. Function of the right upper extremity is excellent although not symmetric with the contralateral side. I cannot think of any intervention that would improve the function of his right upper extremity other than aquatic strengthening exercises aquatic-based strengthening exercises and continued range of  motion. Given the prior radiation and the extent of the surgical procedure I think he has an excellent outcome.   3. RTC in 6 mo for XR of humerus.    John Hudson MD  [facility] Family Professor  Oncology and Adult Reconstructive Surgery  Dept Orthopaedic Surgery, [facility]  [phone] office, [phone] pager  [email or web address]    Total Time = 15 min, 50% of which was spent in counseling and coordination of care as documented above.

## 2017-12-14 NOTE — MR AVS SNAPSHOT
After Visit Summary   12/14/2017    Vince Krueger    MRN: [id number]           Patient Information     Date Of Birth          1975        Visit Information        Provider Department      12/14/2017 1:00 PM John Hudson MD [facility]        Today's Diagnoses     Plasmacytoma of bone (H)    -  1      Care Instructions    MRI cervical/thoracic spine - call with results          Follow-ups after your visit        Your next 10 appointments already scheduled     Dec 24, 2017  8:30 AM CST   (Arrive by 8:15 AM)   MR THORACIC SPINE W/O CONTRAST with [id number]   [facility] [place] [facility] ([facility])    [address]  [facility] [address] [phone]           Take your medicines as usual, unless your doctor tells you not to. Bring a list of your current medicines to your exam (including vitamins, minerals and over-the-counter drugs). Also bring the results of similar scans you may have had.  Please remove any body piercings and hair extensions before you arrive.  Follow your doctor s orders. If you do not, we may have to postpone your exam.  You will not have contrast for this exam. You do not need to do anything special to prepare.  The MRI machine uses a strong magnet. Please wear clothes without metal (snaps, zippers). A sweatsuit works well, or we may give you a hospital gown.   **IMPORTANT** THE INSTRUCTIONS BELOW ARE ONLY FOR THOSE PATIENTS WHO HAVE BEEN TOLD THEY WILL RECEIVE SEDATION OR GENERAL ANESTHESIA DURING THEIR MRI PROCEDURE:  IF YOU WILL RECEIVE SEDATION (take medicine to help you relax during your exam):   You must get the medicine from your doctor before you arrive. Bring the medicine to the exam. Do not take it at home.   Arrive one hour early. Bring someone who can take you home after the test. Your medicine will make you sleepy. After the exam, you may not drive, take a bus or take a taxi by yourself.   No eating 8  hours before your exam. You may have clear liquids up until 4 hours before your exam. (Clear liquids include water, clear tea, black coffee and fruit juice without pulp.)  IF YOU WILL RECEIVE ANESTHESIA (be asleep for your exam):   Arrive 1 1/2 hours early. Bring someone who can take you home after the test. You may not drive, take a bus or take a taxi by yourself.   No eating 8 hours before your exam. You may have clear liquids up until 4 hours before your exam. (Clear liquids include water, clear tea, black coffee and fruit juice without pulp.)   You will spend four to five hours in the recovery room.  Please call the Imaging Department at your exam site with any questions.            Dec 24, 2017  9:15 AM CST   (Arrive by 9:00 AM)   MR CERVICAL SPINE W/O CONTRAST with [id number]   [facility] ([facility])    [address] [address] [phone]           Take your medicines as usual, unless your doctor tells you not to. Bring a list of your current medicines to your exam (including vitamins, minerals and over-the-counter drugs). Also bring the results of similar scans you may have had.  Please remove any body piercings and hair extensions before you arrive.  Follow your doctor s orders. If you do not, we may have to postpone your exam.  You will not have contrast for this exam. You do not need to do anything special to prepare.  The MRI machine uses a strong magnet. Please wear clothes without metal (snaps, zippers). A sweatsuit works well, or we may give you a hospital gown.   **IMPORTANT** THE INSTRUCTIONS BELOW ARE ONLY FOR THOSE PATIENTS WHO HAVE BEEN TOLD THEY WILL RECEIVE SEDATION OR GENERAL ANESTHESIA DURING THEIR MRI PROCEDURE:  IF YOU WILL RECEIVE SEDATION (take medicine to help you relax during your exam):   You must get the medicine from your doctor before you arrive. Bring the medicine to the exam. Do not take it at home.    Arrive one hour early. Bring someone who can take you home after the test. Your medicine will make you sleepy. After the exam, you may not drive, take a bus or take a taxi by yourself.   No eating 8 hours before your exam. You may have clear liquids up until 4 hours before your exam. (Clear liquids include water, clear tea, black coffee and fruit juice without pulp.)  IF YOU WILL RECEIVE ANESTHESIA (be asleep for your exam):   Arrive 1 1/2 hours early. Bring someone who can take you home after the test. You may not drive, take a bus or take a taxi by yourself.   No eating 8 hours before your exam. You may have clear liquids up until 4 hours before your exam. (Clear liquids include water, clear tea, black coffee and fruit juice without pulp.)   You will spend four to five hours in the recovery room.  Please call the Imaging Department at your exam site with any questions.            Jan 03, 2018  3:30 PM CST   [facility] Lab Draw with  MASONIC LAB DRAW   [facility] Draw ([facility])    [address] [place] [address] [phone]            Jan 03, 2018  4:00 PM CST   RETURN ONC with Andre Panda MD   [facility] and Marrow Transplant ([facility])    [address] [place] [address] [phone]              Future tests that were ordered for you today     Open Future Orders        Priority Expected Expires Ordered    MRI Cervical spine w/o contrast Routine  12/14/2018 12/14/2017    MRI Thoracic spine w/o contrast Routine  12/14/2018 12/14/2017            Who to contact     Please call your clinic at [phone] to:    Ask questions about your health    Make or cancel appointments    Discuss your medicines    Learn about your test results    Speak to your doctor   If you have compliments or concerns about an experience at your clinic, or if you wish to file a complaint, please contact  "[facility] Patient Relations at [phone] or email us at [email or web address]         Additional Information About Your Visit        Ervinhart Information     Imcompanyhart gives you secure access to your electronic health record. If you see a primary care provider, you can also send messages to your care team and make appointments. If you have questions, please call your primary care clinic.  If you do not have a primary care provider, please call [phone] and they will assist you.      auctionpoint is an electronic gateway that provides easy, online access to your medical records. With auctionpoint, you can request a clinic appointment, read your test results, renew a prescription or communicate with your care team.     To access your existing account, please contact your [facility] or call [phone] for assistance.        Care EveryWhere ID     This is your Care EveryWhere ID. This could be used by other organizations to access your [place] medical records  [id number]        Your Vitals Were     Height BMI (Body Mass Index)                1.816 m (5' 11.5\") 25.11 kg/m2           Blood Pressure from Last 3 Encounters:   09/27/17 114/74   06/16/17 108/68   06/01/17 123/82    Weight from Last 3 Encounters:   12/14/17 82.8 kg (182 lb 9.6 oz)   09/27/17 80.9 kg (178 lb 4.8 oz)   07/27/17 78.9 kg (174 lb)               Primary Care Provider Office Phone # Fax #    Tanya MIGUEL ÁNGEL Wen [phone]       [facility] [address] [place]  [facility] [address]        Equal Access to Services     GUY CASTRO AH: Hadii lois kamara [place], waaxda luqadaha, qaybta kaalmada leonardo hilario. So [facility] [phone].    ATENCIÓN: Si habla español, tiene a javier disposición servicios gratuitos de asistencia lingüística. Kelvin plunkett [phone].    We comply with applicable federal civil rights laws and [place] laws. " We do not discriminate on the basis of race, color, national origin, age, disability, sex, sexual orientation, or gender identity.            Thank you!     Thank you for choosing [facility]  for your care. Our goal is always to provide you with excellent care. Hearing back from our patients is one way we can continue to improve our services. Please take a few minutes to complete the written survey that you may receive in the mail after your visit with us. Thank you!             Your Updated Medication List - Protect others around you: Learn how to safely use, store and throw away your medicines at www.disposemymeds.org.          This list is accurate as of: 12/14/17  1:41 PM.  Always use your most recent med list.                   Brand Name Dispense Instructions for use Diagnosis    ADVIL PO      Take 200 mg by mouth every 6 hours as needed for moderate pain        loratadine 10 MG tablet    CLARITIN     Take 10 mg by mouth as needed    Lesion of right humerus       MULTIVITAMIN ADULT Chew      Take by mouth daily        RA OMEPRAZOLE 20 MG tablet   Generic drug:  omeprazole      Take 20 mg by mouth    Plasmacytoma of bone (H)

## 2017-12-14 NOTE — NURSING NOTE
"Chief Complaint   Patient presents with     Surgical Followup     6 month POP F/u Biopsy, Curettage and Internal Fixation Right Humerus, and Supplemental Fixation with Zometa-Impregnated Cement DOS: 5/19/17       42 year old  1975    Ht 1.816 m (5' 11.5\")  Wt 82.8 kg (182 lb 9.6 oz)  BMI 25.11 kg/m2         Pain Assessment  Patient Currently in Pain: Yes  0-10 Pain Scale: 5 (Pt states that on average his pain is 2-3, \"minimal\" )  Primary Pain Location: Arm  Pain Orientation: Right, Upper  Pain Descriptors: Aching  Aggravating Factors: Movement (Pt states that when his elbow is bent he has increased pain/stiffness, espec. when lifting.)            [id number] - [place] [address] [address] [facility]    Allergies   Allergen Reactions     No Clinical Screening - See Comments Hives     Palms and bottom of feet itching and hives     Penicillin G Hives     Swelling and turned red     Penicillins Hives, Rash and Swelling     Current Outpatient Prescriptions   Medication     omeprazole (RA OMEPRAZOLE) 20 MG tablet     Ibuprofen (ADVIL PO)     loratadine (CLARITIN) 10 MG tablet     Multiple Vitamins-Minerals (MULTIVITAMIN ADULT) CHEW     No current facility-administered medications for this visit.          Cindy Byrd C.M.A.       "

## 2017-12-14 NOTE — PROGRESS NOTES
Orthopaedic Oncology clinic visit - TOÑO Hudson MD    Requesting physician: Georgi Hammond MD [facility]  Dr. Marlon Pulliam, [facility]  Dr. Radames Alvares [facility]  Tanya Wen NP primary care, [facility], [facility]     Diagnosis:  1. 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 5/19/2017, biopsy, tumor excision, ORIF with zometa impregnated cement ([place]) [facility]      Interval History:  Doing well post-op although he has some discomfort in. He is right-hand dominant. He is having some mid thoracic back pain which is been progressive over the past couple months. It bothers him at nighttime and awakened from sleep. No neurologic symptoms in the left upper extremity. No neurologic symptoms in lower extremities    EXAM: incision healed.  Radial, median, ulnar nn intact motor function  Elbow -5-110 deg arc of motion  Forearm 90/90 sup and pronation  Shoulder forward flexion 170 . Abduction 160 .    XR: intact fixation. Some lucencies noted and slightly larger than prior radiographs. No significant ostial lysis about the implant.        Plan:  1. Obtain MRI examination of cervical thoracic spine to look for evidence of disease in this location given his T-spine symptoms.  I've asked to review findings with his upcoming appointment with Dr. Panda in 2 weeks.  2. Function of the right upper extremity is excellent although not symmetric with the contralateral side. I cannot think of any intervention that would improve the function of his right upper extremity other than aquatic strengthening exercises aquatic-based strengthening exercises and continued range of motion. Given the prior radiation and the extent of the surgical procedure I think he has an excellent outcome.   3. RTC in 6 mo for XR of humerus.        John Hudson MD  [facility] Family Professor  Oncology and Adult Reconstructive Surgery  Dept Orthopaedic Surgery, [facility]  Physicians  [phone] office, [phone] pager  [email or web address]    Total Time = 15 min, 50% of which was spent in counseling and coordination of care as documented above.

## 2017-12-14 NOTE — PROGRESS NOTES
REFERRAL REVIEW FORM    Referred by: Tanya Waite [facility]    Reason for referral: GERD s/p Nissen    Date referral received: 11/27/2017    Date records received: 11/28/2017, 12/22/2017    Date records reviewed: 12/14/2017 - first time; second time 1/3/2017    Automatic yes:     no    Previous work up:    CT scan  GI evaluation - HAS BEEN SEEN AT [facility] AND [facility]. Has had multiple EGDs, UGI series.    Recommendation:    12/14/2017 - WE NEED MORE RECORDS - WE NEED TO ASK FOR [facility] AND [facility] RECORDS - Clinic visits, EGDs, colonoscopies, imaging, labs and path  1/3/2017 - pt has been seen at [facility] and at [facility]. Recommend he return to one of these providers.    When to schedule:  Do not schedule    Date patient was contacted regarding scheduling: __    Comments: See above.

## 2017-12-24 ENCOUNTER — RADIANT APPOINTMENT (OUTPATIENT)
Dept: MRI IMAGING | Facility: CLINIC | Age: 42
End: 2017-12-24
Attending: ORTHOPAEDIC SURGERY
Payer: COMMERCIAL

## 2017-12-24 DIAGNOSIS — C90.30 PLASMACYTOMA OF BONE (H): ICD-10-CM

## 2018-01-31 ENCOUNTER — OFFICE VISIT (OUTPATIENT)
Dept: TRANSPLANT | Facility: CLINIC | Age: 43
End: 2018-01-31
Attending: INTERNAL MEDICINE
Payer: COMMERCIAL

## 2018-01-31 ENCOUNTER — APPOINTMENT (OUTPATIENT)
Dept: LAB | Facility: CLINIC | Age: 43
End: 2018-01-31
Attending: INTERNAL MEDICINE
Payer: COMMERCIAL

## 2018-01-31 VITALS
DIASTOLIC BLOOD PRESSURE: 81 MMHG | OXYGEN SATURATION: 98 % | BODY MASS INDEX: 24.88 KG/M2 | SYSTOLIC BLOOD PRESSURE: 133 MMHG | HEART RATE: 76 BPM | WEIGHT: 180.9 LBS | RESPIRATION RATE: 16 BRPM

## 2018-01-31 DIAGNOSIS — K21.9 GASTROESOPHAGEAL REFLUX DISEASE, ESOPHAGITIS PRESENCE NOT SPECIFIED: ICD-10-CM

## 2018-01-31 DIAGNOSIS — Z98.890 HISTORY OF FUNDOPLICATION: ICD-10-CM

## 2018-01-31 DIAGNOSIS — C90.30 PLASMACYTOMA OF BONE (H): ICD-10-CM

## 2018-01-31 DIAGNOSIS — S42.301D CLOSED FRACTURE OF SHAFT OF RIGHT HUMERUS WITH ROUTINE HEALING, UNSPECIFIED FRACTURE MORPHOLOGY, SUBSEQUENT ENCOUNTER: ICD-10-CM

## 2018-01-31 LAB
ALBUMIN SERPL-MCNC: 3.7 G/DL (ref 3.4–5)
ALP SERPL-CCNC: 87 U/L (ref 40–150)
ALT SERPL W P-5'-P-CCNC: 26 U/L (ref 0–70)
ANION GAP SERPL CALCULATED.3IONS-SCNC: 11 MMOL/L (ref 3–14)
AST SERPL W P-5'-P-CCNC: 25 U/L (ref 0–45)
BASOPHILS # BLD AUTO: 0 10E9/L (ref 0–0.2)
BASOPHILS NFR BLD AUTO: 0.5 %
BILIRUB SERPL-MCNC: 0.3 MG/DL (ref 0.2–1.3)
BUN SERPL-MCNC: 15 MG/DL (ref 7–30)
CALCIUM SERPL-MCNC: 8.9 MG/DL (ref 8.5–10.1)
CHLORIDE SERPL-SCNC: 107 MMOL/L (ref 94–109)
CO2 SERPL-SCNC: 19 MMOL/L (ref 20–32)
CREAT SERPL-MCNC: 0.82 MG/DL (ref 0.66–1.25)
DIFFERENTIAL METHOD BLD: NORMAL
EOSINOPHIL # BLD AUTO: 0.1 10E9/L (ref 0–0.7)
EOSINOPHIL NFR BLD AUTO: 0.9 %
ERYTHROCYTE [DISTWIDTH] IN BLOOD BY AUTOMATED COUNT: 13.2 % (ref 10–15)
GFR SERPL CREATININE-BSD FRML MDRD: >90 ML/MIN/1.7M2
GLUCOSE SERPL-MCNC: 92 MG/DL (ref 70–99)
HCT VFR BLD AUTO: 49.6 % (ref 40–53)
HGB BLD-MCNC: 15.9 G/DL (ref 13.3–17.7)
IMM GRANULOCYTES # BLD: 0 10E9/L (ref 0–0.4)
IMM GRANULOCYTES NFR BLD: 0.2 %
LYMPHOCYTES # BLD AUTO: 1.8 10E9/L (ref 0.8–5.3)
LYMPHOCYTES NFR BLD AUTO: 27.6 %
MCH RBC QN AUTO: 28.1 PG (ref 26.5–33)
MCHC RBC AUTO-ENTMCNC: 32.1 G/DL (ref 31.5–36.5)
MCV RBC AUTO: 88 FL (ref 78–100)
MONOCYTES # BLD AUTO: 0.6 10E9/L (ref 0–1.3)
MONOCYTES NFR BLD AUTO: 8.3 %
NEUTROPHILS # BLD AUTO: 4.2 10E9/L (ref 1.6–8.3)
NEUTROPHILS NFR BLD AUTO: 62.5 %
NRBC # BLD AUTO: 0 10*3/UL
NRBC BLD AUTO-RTO: 0 /100
PLATELET # BLD AUTO: 263 10E9/L (ref 150–450)
POTASSIUM SERPL-SCNC: 4.4 MMOL/L (ref 3.4–5.3)
PROT SERPL-MCNC: 8.2 G/DL (ref 6.8–8.8)
RBC # BLD AUTO: 5.66 10E12/L (ref 4.4–5.9)
SODIUM SERPL-SCNC: 137 MMOL/L (ref 133–144)
WBC # BLD AUTO: 6.6 10E9/L (ref 4–11)

## 2018-01-31 PROCEDURE — 82784 ASSAY IGA/IGD/IGG/IGM EACH: CPT | Performed by: INTERNAL MEDICINE

## 2018-01-31 PROCEDURE — 86334 IMMUNOFIX E-PHORESIS SERUM: CPT | Performed by: INTERNAL MEDICINE

## 2018-01-31 PROCEDURE — 36415 COLL VENOUS BLD VENIPUNCTURE: CPT

## 2018-01-31 PROCEDURE — 80053 COMPREHEN METABOLIC PANEL: CPT | Performed by: INTERNAL MEDICINE

## 2018-01-31 PROCEDURE — 85025 COMPLETE CBC W/AUTO DIFF WBC: CPT | Performed by: INTERNAL MEDICINE

## 2018-01-31 PROCEDURE — 83883 ASSAY NEPHELOMETRY NOT SPEC: CPT | Performed by: INTERNAL MEDICINE

## 2018-01-31 PROCEDURE — G0463 HOSPITAL OUTPT CLINIC VISIT: HCPCS | Mod: ZF

## 2018-01-31 PROCEDURE — 00000402 ZZHCL STATISTIC TOTAL PROTEIN: Performed by: INTERNAL MEDICINE

## 2018-01-31 PROCEDURE — 84165 PROTEIN E-PHORESIS SERUM: CPT | Performed by: INTERNAL MEDICINE

## 2018-01-31 ASSESSMENT — PAIN SCALES - GENERAL: PAINLEVEL: MODERATE PAIN (4)

## 2018-01-31 NOTE — NURSING NOTE
Chief Complaint   Patient presents with     Blood Draw     VPT 23G Butterfly Needle in right arm, vitals taken    Rianna Vela CMA

## 2018-01-31 NOTE — MR AVS SNAPSHOT
After Visit Summary   1/31/2018    Vince Krueger    MRN: [id number]           Patient Information     Date Of Birth          1975        Visit Information        Provider Department      1/31/2018 3:30 PM Andre Panda MD [facility] Mar[facility] Trans[facility]        Today's Diagnoses     Plasmacytoma of bone (H)        Closed fracture of shaft of right humerus with routine healing, unspecified fracture morphology, subsequent encounter        History of fundoplication        Gastroesophageal reflux disease, esophagitis presence not specified              [facility] ([facility])  [address] [place] [address]  Phone: [phone]  Clinic Hours:   Monday-Thursday:7am to 7pm   Friday: 7am to 5pm   Weekends and holidays:    8am to noon (in general)  If your fever is 100.5  or greater,   call the clinic.  After hours call the   hospital at [phone] or   [phone]. Ask for the BMT   fellow on-call            Follow-ups after your visit        Who to contact     If you have questions or need follow up information about today's clinic visit or your schedule please contact [facility] MARROW TRANSPLANT directly at [phone].  Normal or non-critical lab and imaging results will be communicated to you by XZEREShart, letter or phone within 4 business days after the clinic has received the results. If you do not hear from us within 7 days, please contact the clinic through XZEREShart or phone. If you have a critical or abnormal lab result, we will notify you by phone as soon as possible.  Submit refill requests through DebtMarket or call your pharmacy and they will forward the refill request to us. Please allow 3 business days for your refill to be completed.          Additional Information About Your Visit        XZEREShart Information     DebtMarket gives you secure access to your electronic health record. If you see a primary care provider, you can also send messages to your  care team and make appointments. If you have questions, please call your primary care clinic.  If you do not have a primary care provider, please call [phone] and they will assist you.        Care EveryWhere ID     This is your Care EveryWhere ID. This could be used by other organizations to access your [place] medical records  [id number]        Your Vitals Were     Pulse Respirations Pulse Oximetry BMI (Body Mass Index)          76 16 98% 24.88 kg/m2         Blood Pressure from Last 3 Encounters:   01/31/18 133/81   09/27/17 114/74   06/16/17 108/68    Weight from Last 3 Encounters:   01/31/18 82.1 kg (180 lb 14.4 oz)   12/14/17 82.8 kg (182 lb 9.6 oz)   09/27/17 80.9 kg (178 lb 4.8 oz)              We Performed the Following     CBC with platelets differential     Comprehensive metabolic panel     Kappa and lambda light chain     Protein electrophoresis     Protein Immunofixation Serum        Recent Review Flowsheet Data     BMT Recent Results Latest Ref Rng & Units 5/11/2017 5/19/2017 5/20/2017 6/1/2017 6/26/2017 9/27/2017 1/31/2018    WBC 4.0 - 11.0 10e9/L - 11.4(H) - 6.9 6.3 5.8 6.6    Hemoglobin 13.3 - 17.7 g/dL - 11.8(L) 11.6(L) 13.7 13.7 15.6 15.9    Platelet Count 150 - 450 10e9/L - 186 - 261 178 202 263    Neutrophils (Absolute) 1.6 - 8.3 10e9/L - - - 4.8 4.1 3.8 4.2    INR 0.86 - 1.14 - 1.03 - - - - -    Sodium 133 - 144 mmol/L 138 - - - - 137 137    Potassium 3.4 - 5.3 mmol/L 4.5 - - - - 4.4 4.4    Chloride 94 - 109 mmol/L 103 - - - - 104 107    Glucose 70 - 99 mg/dL 130(H) - - 99 - 109(H) 92    Urea Nitrogen 7 - 30 mg/dL 12 - - - - 12 15    Creatinine 0.66 - 1.25 mg/dL 0.88 - - - - 1.08 0.82    Calcium (Total) 8.5 - 10.1 mg/dL 9.1 - - - - 8.5 8.9    Protein (Total) 6.8 - 8.8 g/dL 8.5 - - - - 7.5 8.2    Albumin 3.4 - 5.0 g/dL 3.9 - - - - 3.7 3.7    Alkaline Phosphatase 40 - 150 U/L 56 - - - - 68 87    AST 0 - 45 U/L 13 - - - - 19 25    ALT 0 - 70 U/L 21 - - - - 31 26    MCV 78 - 100 fl - 89 - 87  89 87 88               Primary Care Provider Office Phone # Fax #    Tanya Wen [phone]       [facility] [address] [place]INA VASQUEZ [facility] [address]        Equal Access to Services     GUY CASTRO : Hadii aad ku hadcorinneo Soanaiali, waaxda luqadaha, qaybta kaalmada adeegyada, leonardo harry miriam garcia. So [facility] [phone].    ATENCIÓN: Si habla español, tiene a javier disposición servicios gratuitos de asistencia lingüística. Llame al [phone].    We comply with applicable federal civil rights laws and [place] laws. We do not discriminate on the basis of race, color, national origin, age, disability, sex, sexual orientation, or gender identity.            Thank you!     Thank you for choosing [facility] BLOOD AND MARROW TRANSPLANT  for your care. Our goal is always to provide you with excellent care. Hearing back from our patients is one way we can continue to improve our services. Please take a few minutes to complete the written survey that you may receive in the mail after your visit with us. Thank you!             Your Updated Medication List - Protect others around you: Learn how to safely use, store and throw away your medicines at www.disposemymeds.org.          This list is accurate as of 1/31/18 11:08 PM.  Always use your most recent med list.                   Brand Name Dispense Instructions for use Diagnosis    ADVIL PO      Take 200 mg by mouth every 6 hours as needed for moderate pain        loratadine 10 MG tablet    CLARITIN     Take 10 mg by mouth as needed    Lesion of right humerus       MULTIVITAMIN ADULT Chew      Take by mouth daily        RA OMEPRAZOLE 20 MG tablet   Generic drug:  omeprazole      Take 20 mg by mouth    Plasmacytoma of bone (H)

## 2018-01-31 NOTE — NURSING NOTE
"Oncology Rooming Note    January 31, 2018 3:38 PM   Vince Krueger is a 42 year old male who presents for:    Chief Complaint   Patient presents with     Blood Draw     VPT 23G Butterfly Needle in right arm, vitals taken      Initial Vitals: /81 (BP Location: Right arm, Patient Position: Chair, Cuff Size: Adult Regular)  Pulse 76  Resp 16  Wt 82.1 kg (180 lb 14.4 oz)  SpO2 98%  BMI 24.88 kg/m2 Estimated body mass index is 24.88 kg/(m^2) as calculated from the following:    Height as of 12/14/17: 1.816 m (5' 11.5\").    Weight as of this encounter: 82.1 kg (180 lb 14.4 oz). Body surface area is 2.04 meters squared.  Moderate Pain (4) Comment: R elbow to shoulder   No LMP for male patient.  Allergies reviewed: Yes  Medications reviewed: Yes    Medications: Medication refills not needed today.  Pharmacy name entered into EPIC: [id number] - [address]    Clinical concerns: Continues to have R upper arm pain.  Takes Advil \"with help.\"    10 minutes for nursing intake (face to face time)     Margi Mariee RN            "

## 2018-02-01 LAB
ALBUMIN SERPL ELPH-MCNC: 4.5 G/DL (ref 3.7–5.1)
ALPHA1 GLOB SERPL ELPH-MCNC: 0.3 G/DL (ref 0.2–0.4)
ALPHA2 GLOB SERPL ELPH-MCNC: 0.7 G/DL (ref 0.5–0.9)
B-GLOBULIN SERPL ELPH-MCNC: 1 G/DL (ref 0.6–1)
GAMMA GLOB SERPL ELPH-MCNC: 1.3 G/DL (ref 0.7–1.6)
IGA SERPL-MCNC: 298 MG/DL (ref 70–380)
IGG SERPL-MCNC: 1300 MG/DL (ref 695–1620)
IGM SERPL-MCNC: 108 MG/DL (ref 60–265)
KAPPA LC UR-MCNC: 0.94 MG/DL (ref 0.33–1.94)
KAPPA LC/LAMBDA SER: 0.53 {RATIO} (ref 0.26–1.65)
LAMBDA LC SERPL-MCNC: 1.79 MG/DL (ref 0.57–2.63)
M PROTEIN SERPL ELPH-MCNC: 0 G/DL
PROT PATTERN SERPL ELPH-IMP: NORMAL
PROT PATTERN SERPL IFE-IMP: NORMAL

## 2018-02-01 NOTE — PROGRESS NOTES
HEMATOLOGY AND ONCOLOGY    RETURN PATIENT VISIT    NAME: Vince Krueger ARACELI: Jan 31, 2020   MRN: [id number]      REFERRING PHYSICIAN: John Hudson  Diagnosis: R humeral solitary plasmacytoma; presented with multiple month history of R arm pain  Treatment: s/p surgery ORIF 5/19/2017 by Piero Hudson  - XRT to 4500 cGy by Trina Jasmine MD at [facility] with good tolerance.       HPI/ROS:     He is generally feeling well. Still notes some mild ROM limitation and pain over the inner/upper R shoulder. Continues to have intermittent episodes of hypoglycemia. No new bone pains anywhere. His energy has been pretty good.  He denies any fevers, chills, sweats, nausea, vomiting, diarrhea.   He is noted increase in acid reflux recently over the past few months.  He is getting additional evaluation by the gastroenterologist in the coming days.  He notes some mild back pain in December and this was evaluated by the surgeon Dr. Hudson and there is no evidence of myeloma in the MRIs of the spine so that was reassuring. Still with episodic hypoglycemic episodes.  Will be seen by Endocrine in March for this.     Rest of a 10 point ROS was negative today.            Physical Exam:     /81 (BP Location: Right arm, Patient Position: Chair, Cuff Size: Adult Regular)  Pulse 76  Resp 16  Wt 82.1 kg (180 lb 14.4 oz)  SpO2 98%  BMI 24.88 kg/m2   ECOG:    Wt Readings from Last 5 Encounters:   01/31/18 82.1 kg (180 lb 14.4 oz)   12/14/17 82.8 kg (182 lb 9.6 oz)   09/27/17 80.9 kg (178 lb 4.8 oz)   07/27/17 78.9 kg (174 lb)   07/20/17 79.4 kg (175 lb)     General:  no acute distress.   HEENT: no cervical LAD. MMM    Respiratory: clear to  auscultation bilaterally.     Cardiovascular: Regular rate and rhythm. No murmur or rub.   Abdomen: Normoactive bowel sounds. Abdomen soft, non-distended, and non-tender. No palpable masses or organomegaly.  Extremities: no edema.    Neurologic: alert, conversing appropriately. Grossly  non-focal.     Psychiatric: Mentation and affect appear normal.    Musculoskeletal: has limited ROM actively in R arm  Skin: No concerning lesions or rash on exposed surfaces. Incision site approximately 6 inches in length R arm. C/D/I          Data:   reviewed         Assessment and plan:     # Solitary plasmacytoma of R humerus:   He is doing relatively well after the surgery in May 2017.  He has had 9 months of follow-up and he is currently doing well.  He has no evidence of myeloma.  We discussed following his labs every 3 months for the first year after the surgery and then planning to spacing out every 6 months thereafter.  His labs from today regarding evaluation for myeloma are still in process.  We will follow up on them and notify him if there are any significance concerns    # Acid reflux: Has a history of the Nissen fundoplication surgery and has noted increasing acid reflux for the past few months.  He is going to get some additional studies by GI doctors to evaluate things and the integrity of the Nissen fundoplication.    # Low bicarbonate:  Recommend he follow up with his primary care to discuss management of low bicarbonate.  He does not have the typical causes of a non-ion gap metabolic acidosis such as diarrhea.   Potentially he could have a renal tubular acidosis or this could be related to his increase in acid reflux    # Hypoglycemia:  This is still an issue for him.  Unclear if this is related to the Nissen fundoplication surgery.  He will f/up with the endocrinologist.    Seen and discussed with Dr. Panda who agrees with assessment and plan    Dennis Matta MD   PGY5  [facility] Fellow      Staff Addendum: Patient was seen and examined by me. All labs, VS and pertinent images were reviewed with the patient and Dr. Matta. Plans for care were mutually developed and outlined above with my direct input and editing of the note.    Andre Panda, DO

## 2018-04-25 DIAGNOSIS — C90.30 PLASMACYTOMA OF BONE (H): Primary | ICD-10-CM

## 2018-05-03 ENCOUNTER — APPOINTMENT (OUTPATIENT)
Dept: LAB | Facility: CLINIC | Age: 43
End: 2018-05-03
Attending: INTERNAL MEDICINE
Payer: COMMERCIAL

## 2018-05-03 ENCOUNTER — ONCOLOGY VISIT (OUTPATIENT)
Dept: ONCOLOGY | Facility: CLINIC | Age: 43
End: 2018-05-03
Attending: INTERNAL MEDICINE
Payer: COMMERCIAL

## 2018-05-03 VITALS
RESPIRATION RATE: 18 BRPM | BODY MASS INDEX: 25.66 KG/M2 | DIASTOLIC BLOOD PRESSURE: 76 MMHG | SYSTOLIC BLOOD PRESSURE: 121 MMHG | TEMPERATURE: 98.1 F | HEIGHT: 71 IN | WEIGHT: 183.3 LBS | HEART RATE: 79 BPM | OXYGEN SATURATION: 98 %

## 2018-05-03 DIAGNOSIS — C90.30 PLASMACYTOMA OF BONE (H): Primary | ICD-10-CM

## 2018-05-03 LAB
ALBUMIN SERPL-MCNC: 4 G/DL (ref 3.4–5)
ALP SERPL-CCNC: 74 U/L (ref 40–150)
ALT SERPL W P-5'-P-CCNC: 22 U/L (ref 0–70)
ANION GAP SERPL CALCULATED.3IONS-SCNC: 8 MMOL/L (ref 3–14)
AST SERPL W P-5'-P-CCNC: 21 U/L (ref 0–45)
BASOPHILS # BLD AUTO: 0 10E9/L (ref 0–0.2)
BASOPHILS NFR BLD AUTO: 0.4 %
BILIRUB SERPL-MCNC: 0.3 MG/DL (ref 0.2–1.3)
BUN SERPL-MCNC: 15 MG/DL (ref 7–30)
CALCIUM SERPL-MCNC: 8.9 MG/DL (ref 8.5–10.1)
CHLORIDE SERPL-SCNC: 106 MMOL/L (ref 94–109)
CO2 SERPL-SCNC: 24 MMOL/L (ref 20–32)
CREAT SERPL-MCNC: 1.01 MG/DL (ref 0.66–1.25)
DIFFERENTIAL METHOD BLD: NORMAL
EOSINOPHIL # BLD AUTO: 0 10E9/L (ref 0–0.7)
EOSINOPHIL NFR BLD AUTO: 0.7 %
ERYTHROCYTE [DISTWIDTH] IN BLOOD BY AUTOMATED COUNT: 13.3 % (ref 10–15)
GFR SERPL CREATININE-BSD FRML MDRD: 80 ML/MIN/1.7M2
GLUCOSE SERPL-MCNC: 108 MG/DL (ref 70–99)
HCT VFR BLD AUTO: 48.7 % (ref 40–53)
HGB BLD-MCNC: 16 G/DL (ref 13.3–17.7)
IMM GRANULOCYTES # BLD: 0 10E9/L (ref 0–0.4)
IMM GRANULOCYTES NFR BLD: 0 %
LYMPHOCYTES # BLD AUTO: 1.4 10E9/L (ref 0.8–5.3)
LYMPHOCYTES NFR BLD AUTO: 25.5 %
MCH RBC QN AUTO: 28.7 PG (ref 26.5–33)
MCHC RBC AUTO-ENTMCNC: 32.9 G/DL (ref 31.5–36.5)
MCV RBC AUTO: 87 FL (ref 78–100)
MONOCYTES # BLD AUTO: 0.4 10E9/L (ref 0–1.3)
MONOCYTES NFR BLD AUTO: 7.2 %
NEUTROPHILS # BLD AUTO: 3.7 10E9/L (ref 1.6–8.3)
NEUTROPHILS NFR BLD AUTO: 66.2 %
NRBC # BLD AUTO: 0 10*3/UL
NRBC BLD AUTO-RTO: 0 /100
PLATELET # BLD AUTO: 214 10E9/L (ref 150–450)
POTASSIUM SERPL-SCNC: 4 MMOL/L (ref 3.4–5.3)
PROT SERPL-MCNC: 7.8 G/DL (ref 6.8–8.8)
RBC # BLD AUTO: 5.58 10E12/L (ref 4.4–5.9)
SODIUM SERPL-SCNC: 138 MMOL/L (ref 133–144)
WBC # BLD AUTO: 5.5 10E9/L (ref 4–11)

## 2018-05-03 PROCEDURE — 00000402 ZZHCL STATISTIC TOTAL PROTEIN: Performed by: INTERNAL MEDICINE

## 2018-05-03 PROCEDURE — 82784 ASSAY IGA/IGD/IGG/IGM EACH: CPT | Performed by: INTERNAL MEDICINE

## 2018-05-03 PROCEDURE — 99213 OFFICE O/P EST LOW 20 MIN: CPT | Mod: ZP | Performed by: INTERNAL MEDICINE

## 2018-05-03 PROCEDURE — 80053 COMPREHEN METABOLIC PANEL: CPT | Performed by: INTERNAL MEDICINE

## 2018-05-03 PROCEDURE — 85025 COMPLETE CBC W/AUTO DIFF WBC: CPT | Performed by: INTERNAL MEDICINE

## 2018-05-03 PROCEDURE — 82306 VITAMIN D 25 HYDROXY: CPT | Performed by: INTERNAL MEDICINE

## 2018-05-03 PROCEDURE — 36415 COLL VENOUS BLD VENIPUNCTURE: CPT

## 2018-05-03 PROCEDURE — G0463 HOSPITAL OUTPT CLINIC VISIT: HCPCS | Mod: ZF

## 2018-05-03 PROCEDURE — 86334 IMMUNOFIX E-PHORESIS SERUM: CPT | Performed by: INTERNAL MEDICINE

## 2018-05-03 PROCEDURE — 83883 ASSAY NEPHELOMETRY NOT SPEC: CPT | Performed by: INTERNAL MEDICINE

## 2018-05-03 PROCEDURE — 84165 PROTEIN E-PHORESIS SERUM: CPT | Performed by: INTERNAL MEDICINE

## 2018-05-03 ASSESSMENT — PAIN SCALES - GENERAL: PAINLEVEL: NO PAIN (0)

## 2018-05-03 NOTE — NURSING NOTE
done by RN, pt tolerated well, labs collected and sent, VS taken and pt checked in for next appt.   Jessica Le

## 2018-05-03 NOTE — LETTER
5/3/2018       RE: Vince Krueger  [id number] [facility] [address]     Dear Colleague,    Thank you for referring your patient, Vince Krueger, to the [facility]. Please see a copy of my visit note below.      HEMATOLOGY CLINIC VISIT    Vince Krueger is a 42-year-old male with a history of right humeral solitary plasmacytoma, who is here today in followup.  He was previously followed by Dr. Andre Panda, who has recently left the [facility].      Vince presented in 05/2017 with a several month history of right arm pain.  He was found to have a solitary plasmacytoma of the right humerus.  He underwent surgery in 05/2017 by Dr. Riaz Hudson followed by radiation therapy which he completed at [facility].  At the time of presentation, he had no evidence of myeloma.  Dr. Panda's plan had been to follow labs every 3 months for the first year and then spacing out followup visits every 6 months thereafter.      Overall, he is feeling well and has no new complaints or concerns today.      PHYSICAL EXAMINATION:  He appears well.   VITAL SIGNS:  He is afebrile.  Blood pressure is 121/76, pulse is 79 and regular.  Respirations are unlabored.  O2 sat 98% on room air.  His weight is 183 pounds, which is stable.   HEAD AND NECK:  Normal.   LUNGS:  Clear.   HEART:  Regular rate and rhythm, normal S1, S2, without rub, gallop or murmur.   ABDOMEN:  Benign.  No palpable lymphadenopathy.   EXTREMITIES:  No lower extremity edema.  No new skin rashes, lesions, bruises or petechia.  He has a right upper extremity scar which is tender overlying it; this is not new.      LABORATORY DATA:  Comprehensive metabolic panel shows normal liver and renal function.  CBC is normal.  Serum protein electrophoresis is pending.        ASSESSMENT AND PLAN:    History of solitary plasmacytoma of the right upper extremity, status post surgery followed by radiation.  Vince is now 1 year out from the completion of treatment  for solitary plasmacytoma involving the right humerus.  At the time of presentation, he had no evidence of multiple myeloma.  We are continuing to follow labs every 3 months.  Assuming the results of today's serum protein electrophoresis return normal, we can plan to see him back in 6 months with repeat labs.     ADDENDUM:  SPEP shows no monoclonal protein.  Quantitative immunoglobulin levels are normal.  Serum free light chain assay is normal.    RTC 6 months with labs.      Leonel Mac MD  Associate Professor of Medicine  Division of Hematology, Oncology, and Transplantation  Director, [facility]

## 2018-05-03 NOTE — PROGRESS NOTES
HEMATOLOGY CLINIC VISIT    Vince Krueger is a 42-year-old male with a history of right humeral solitary plasmacytoma, who is here today in followup.  He was previously followed by Dr. Andre Panda, who has recently left the [facility].      Vince presented in 05/2017 with a several month history of right arm pain.  He was found to have a solitary plasmacytoma of the right humerus.  He underwent surgery in 05/2017 by Dr. Riaz Hudson followed by radiation therapy which he completed at [facility].  At the time of presentation, he had no evidence of myeloma.  Dr. Panda's plan had been to follow labs every 3 months for the first year and then spacing out followup visits every 6 months thereafter.      Overall, he is feeling well and has no new complaints or concerns today.      PHYSICAL EXAMINATION:  He appears well.   VITAL SIGNS:  He is afebrile.  Blood pressure is 121/76, pulse is 79 and regular.  Respirations are unlabored.  O2 sat 98% on room air.  His weight is 183 pounds, which is stable.   HEAD AND NECK:  Normal.   LUNGS:  Clear.   HEART:  Regular rate and rhythm, normal S1, S2, without rub, gallop or murmur.   ABDOMEN:  Benign.  No palpable lymphadenopathy.   EXTREMITIES:  No lower extremity edema.  No new skin rashes, lesions, bruises or petechia.  He has a right upper extremity scar which is tender overlying it; this is not new.      LABORATORY DATA:  Comprehensive metabolic panel shows normal liver and renal function.  CBC is normal.  Serum protein electrophoresis is pending.        ASSESSMENT AND PLAN:    History of solitary plasmacytoma of the right upper extremity, status post surgery followed by radiation.  Vince is now 1 year out from the completion of treatment for solitary plasmacytoma involving the right humerus.  At the time of presentation, he had no evidence of multiple myeloma.  We are continuing to follow labs every 3 months.  Assuming the results of today's serum protein  electrophoresis return normal, we can plan to see him back in 6 months with repeat labs.     ADDENDUM:  SPEP shows no monoclonal protein.  Quantitative immunoglobulin levels are normal.  Serum free light chain assay is normal.    RTC 6 months with labs.      Leonel Mac MD  Associate Professor of Medicine  Division of Hematology, Oncology, and Transplantation  Director, [facility]

## 2018-05-03 NOTE — NURSING NOTE
"Oncology Rooming Note    May 3, 2018 2:32 PM   Vince Krueger is a 42 year old male who presents for:    Chief Complaint   Patient presents with     Blood Draw     Oncology Clinic Visit     Myeloma     Initial Vitals: /76  Pulse 79  Temp 98.1  F (36.7  C)  Resp 18  Ht 1.803 m (5' 11\")  Wt 83.1 kg (183 lb 4.8 oz)  SpO2 98%  BMI 25.57 kg/m2 Estimated body mass index is 25.57 kg/(m^2) as calculated from the following:    Height as of this encounter: 1.803 m (5' 11\").    Weight as of this encounter: 83.1 kg (183 lb 4.8 oz). Body surface area is 2.04 meters squared.  No Pain (0) Comment: Data Unavailable   No LMP for male patient.  Allergies reviewed: Yes  Medications reviewed: Yes    Medications: Medication refills not needed today.  Pharmacy name entered into EPIC: [id number] - [facility] [address]    Clinical concerns: No New Concerns    5 minutes for nursing intake (face to face time)     BERTRAND Camacho      "

## 2018-05-04 LAB
ALBUMIN SERPL ELPH-MCNC: 4.5 G/DL (ref 3.7–5.1)
ALPHA1 GLOB SERPL ELPH-MCNC: 0.3 G/DL (ref 0.2–0.4)
ALPHA2 GLOB SERPL ELPH-MCNC: 0.6 G/DL (ref 0.5–0.9)
B-GLOBULIN SERPL ELPH-MCNC: 0.9 G/DL (ref 0.6–1)
DEPRECATED CALCIDIOL+CALCIFEROL SERPL-MC: 29 UG/L (ref 20–75)
GAMMA GLOB SERPL ELPH-MCNC: 1.2 G/DL (ref 0.7–1.6)
IGA SERPL-MCNC: 268 MG/DL (ref 70–380)
IGG SERPL-MCNC: 1250 MG/DL (ref 695–1620)
IGM SERPL-MCNC: 103 MG/DL (ref 60–265)
KAPPA LC UR-MCNC: 0.81 MG/DL (ref 0.33–1.94)
KAPPA LC/LAMBDA SER: 0.52 {RATIO} (ref 0.26–1.65)
LAMBDA LC SERPL-MCNC: 1.56 MG/DL (ref 0.57–2.63)
M PROTEIN SERPL ELPH-MCNC: 0 G/DL
PROT PATTERN SERPL ELPH-IMP: NORMAL
PROT PATTERN SERPL IFE-IMP: NORMAL

## 2018-08-22 ENCOUNTER — ONCOLOGY VISIT (OUTPATIENT)
Dept: ONCOLOGY | Facility: CLINIC | Age: 43
End: 2018-08-22
Attending: PHYSICIAN ASSISTANT
Payer: COMMERCIAL

## 2018-08-22 ENCOUNTER — RADIANT APPOINTMENT (OUTPATIENT)
Dept: GENERAL RADIOLOGY | Facility: CLINIC | Age: 43
End: 2018-08-22
Attending: PHYSICIAN ASSISTANT
Payer: COMMERCIAL

## 2018-08-22 ENCOUNTER — RADIANT APPOINTMENT (OUTPATIENT)
Dept: CT IMAGING | Facility: CLINIC | Age: 43
End: 2018-08-22
Attending: PHYSICIAN ASSISTANT
Payer: COMMERCIAL

## 2018-08-22 ENCOUNTER — TELEPHONE (OUTPATIENT)
Dept: ONCOLOGY | Facility: CLINIC | Age: 43
End: 2018-08-22

## 2018-08-22 VITALS
SYSTOLIC BLOOD PRESSURE: 132 MMHG | WEIGHT: 180.2 LBS | HEIGHT: 71 IN | DIASTOLIC BLOOD PRESSURE: 89 MMHG | OXYGEN SATURATION: 98 % | BODY MASS INDEX: 25.23 KG/M2 | HEART RATE: 94 BPM | TEMPERATURE: 98 F | RESPIRATION RATE: 16 BRPM

## 2018-08-22 DIAGNOSIS — R07.81 RIB PAIN ON RIGHT SIDE: ICD-10-CM

## 2018-08-22 DIAGNOSIS — M79.621 PAIN OF RIGHT UPPER ARM: Primary | ICD-10-CM

## 2018-08-22 DIAGNOSIS — M79.621 PAIN OF RIGHT UPPER ARM: ICD-10-CM

## 2018-08-22 DIAGNOSIS — C90.30 PLASMACYTOMA OF BONE (H): Primary | ICD-10-CM

## 2018-08-22 DIAGNOSIS — M25.511 PAIN OF RIGHT SHOULDER REGION: ICD-10-CM

## 2018-08-22 DIAGNOSIS — Z85.79 HISTORY OF PLASMACYTOMA: ICD-10-CM

## 2018-08-22 DIAGNOSIS — C90.30 PLASMACYTOMA OF BONE (H): ICD-10-CM

## 2018-08-22 LAB
ALBUMIN SERPL-MCNC: 3.8 G/DL (ref 3.4–5)
ALP SERPL-CCNC: 94 U/L (ref 40–150)
ALT SERPL W P-5'-P-CCNC: 30 U/L (ref 0–70)
ANION GAP SERPL CALCULATED.3IONS-SCNC: 3 MMOL/L (ref 3–14)
AST SERPL W P-5'-P-CCNC: 18 U/L (ref 0–45)
BASOPHILS # BLD AUTO: 0 10E9/L (ref 0–0.2)
BASOPHILS NFR BLD AUTO: 0.4 %
BILIRUB SERPL-MCNC: 0.2 MG/DL (ref 0.2–1.3)
BUN SERPL-MCNC: 15 MG/DL (ref 7–30)
CALCIUM SERPL-MCNC: 9.5 MG/DL (ref 8.5–10.1)
CHLORIDE SERPL-SCNC: 104 MMOL/L (ref 94–109)
CO2 SERPL-SCNC: 31 MMOL/L (ref 20–32)
CREAT SERPL-MCNC: 0.98 MG/DL (ref 0.66–1.25)
DIFFERENTIAL METHOD BLD: NORMAL
EOSINOPHIL # BLD AUTO: 0.1 10E9/L (ref 0–0.7)
EOSINOPHIL NFR BLD AUTO: 1.1 %
ERYTHROCYTE [DISTWIDTH] IN BLOOD BY AUTOMATED COUNT: 13 % (ref 10–15)
GFR SERPL CREATININE-BSD FRML MDRD: 83 ML/MIN/1.7M2
GLUCOSE SERPL-MCNC: 105 MG/DL (ref 70–99)
HCT VFR BLD AUTO: 46.7 % (ref 40–53)
HGB BLD-MCNC: 15.1 G/DL (ref 13.3–17.7)
IMM GRANULOCYTES # BLD: 0 10E9/L (ref 0–0.4)
IMM GRANULOCYTES NFR BLD: 0.2 %
LYMPHOCYTES # BLD AUTO: 1.4 10E9/L (ref 0.8–5.3)
LYMPHOCYTES NFR BLD AUTO: 24.8 %
MCH RBC QN AUTO: 28.9 PG (ref 26.5–33)
MCHC RBC AUTO-ENTMCNC: 32.3 G/DL (ref 31.5–36.5)
MCV RBC AUTO: 89 FL (ref 78–100)
MONOCYTES # BLD AUTO: 0.5 10E9/L (ref 0–1.3)
MONOCYTES NFR BLD AUTO: 8.6 %
NEUTROPHILS # BLD AUTO: 3.6 10E9/L (ref 1.6–8.3)
NEUTROPHILS NFR BLD AUTO: 64.9 %
NRBC # BLD AUTO: 0 10*3/UL
NRBC BLD AUTO-RTO: 0 /100
PLATELET # BLD AUTO: 276 10E9/L (ref 150–450)
POTASSIUM SERPL-SCNC: 4.4 MMOL/L (ref 3.4–5.3)
PROT SERPL-MCNC: 8.1 G/DL (ref 6.8–8.8)
RBC # BLD AUTO: 5.23 10E12/L (ref 4.4–5.9)
SODIUM SERPL-SCNC: 138 MMOL/L (ref 133–144)
WBC # BLD AUTO: 5.6 10E9/L (ref 4–11)

## 2018-08-22 PROCEDURE — 82784 ASSAY IGA/IGD/IGG/IGM EACH: CPT | Performed by: PHYSICIAN ASSISTANT

## 2018-08-22 PROCEDURE — 00000402 ZZHCL STATISTIC TOTAL PROTEIN: Performed by: PHYSICIAN ASSISTANT

## 2018-08-22 PROCEDURE — 83883 ASSAY NEPHELOMETRY NOT SPEC: CPT | Performed by: PHYSICIAN ASSISTANT

## 2018-08-22 PROCEDURE — 86334 IMMUNOFIX E-PHORESIS SERUM: CPT | Performed by: PHYSICIAN ASSISTANT

## 2018-08-22 PROCEDURE — 99214 OFFICE O/P EST MOD 30 MIN: CPT | Mod: ZP | Performed by: PHYSICIAN ASSISTANT

## 2018-08-22 PROCEDURE — 85025 COMPLETE CBC W/AUTO DIFF WBC: CPT | Performed by: PHYSICIAN ASSISTANT

## 2018-08-22 PROCEDURE — 80053 COMPREHEN METABOLIC PANEL: CPT | Performed by: PHYSICIAN ASSISTANT

## 2018-08-22 PROCEDURE — 84165 PROTEIN E-PHORESIS SERUM: CPT | Performed by: PHYSICIAN ASSISTANT

## 2018-08-22 PROCEDURE — G0463 HOSPITAL OUTPT CLINIC VISIT: HCPCS | Mod: ZF

## 2018-08-22 RX ORDER — OXYCODONE HYDROCHLORIDE 5 MG/1
5 TABLET ORAL EVERY 6 HOURS PRN
Qty: 15 TABLET | Refills: 0 | Status: SHIPPED | OUTPATIENT
Start: 2018-08-22 | End: 2020-02-13

## 2018-08-22 ASSESSMENT — PAIN SCALES - GENERAL: PAINLEVEL: MILD PAIN (3)

## 2018-08-22 NOTE — PROGRESS NOTES
Oncology/Hematology Visit Note  Aug 22, 2018    Reason for Visit: Add on right arm pain    History of Present Illness: Vince Krueger is a 43 year old male with a history of right humeral solitary plasmacytoma. He previously followed with Dr. Panda and now sees Dr. Mac for follow-up. He was diagnosed in May 2017 after presenting with several months of right arm pain. He was found to have a solitary plasmacytoma of the right humerus. He underwent surgery with Dr. Hudson followed by radiation therapy. He had no evidence of myeloma at time of presentation. Plan was to follow his labs every 3 months the first year and then space out to 6 months following that. He last saw Dr. Mac 5/3/18 when he was feeling labs and all myeloma labs WNL.     He called in with worsening right arm pain which is why I am seeing him today.    Interval History:  Mr. Krueger returns to clinic today with his wife. He states that while he always has had mild pain in his right arm since surgery, since last Thursday he has had acute worsening of his pain. The pain is mainly located in his mid right humerus but it radiates into his right elbow and right shoulder. The pain varies in intensity-was severe last night but better today. He had numbness in his left hand last night but this is resolved. He notes mild weakness in the sense he has more pain when he has to use the arm. No problems with circulation. He cannot recall any trauma to the area or trigger. He still has full ROM of arm. He has been using Tylenol 1000mg TID along with PRN Advil and some old hydrocodone for pain control. The Tylenol tends to work well but there have been a couple of nights, including last night, where the pain was so severe he couldn't sleep. He also saw his chiropractor for this who felt it was not muscular in nature and recommended he call here.    He also admits to appx 1 months of right posterior rib pain that is worse with deep breath and is tender to  "palpation. No shortness of breath, other chest pain, or coughing. No fevers, chills, or sweats. No recent infections. He denies any other areas of new pains.    He states he is eating and drinking well. No urinary concerns. Has a history of GI symptoms with N/V and prior abram fundoplication procedure but this is a chronic issue and he denies wanting to see his prior GI team about it. No bleeding issues. No rashes or swelling.      Current Outpatient Prescriptions   Medication Sig Dispense Refill     blood glucose monitoring ([employer] TEST STRIPS) test strip Dispense meter, test strips, lancets covered by pt ins. Hypoglycemia.  Accu check Marjorie connect. Testing once daily       Ibuprofen (ADVIL PO) Take 200 mg by mouth every 6 hours as needed for moderate pain       loratadine (CLARITIN) 10 MG tablet Take 10 mg by mouth as needed        Multiple Vitamins-Minerals (MULTIVITAMIN ADULT) CHEW Take by mouth daily       omeprazole (RA OMEPRAZOLE) 20 MG tablet Take 20 mg by mouth         Physical Examination:  /89 (BP Location: Right arm, Patient Position: Chair, Cuff Size: Adult Regular)  Pulse 94  Temp 98  F (36.7  C) (Oral)  Resp 16  Ht 1.803 m (5' 10.98\")  Wt 81.7 kg (180 lb 3.2 oz)  SpO2 98%  BMI 25.14 kg/m2  Wt Readings from Last 10 Encounters:   05/03/18 83.1 kg (183 lb 4.8 oz)   01/31/18 82.1 kg (180 lb 14.4 oz)   12/14/17 82.8 kg (182 lb 9.6 oz)   09/27/17 80.9 kg (178 lb 4.8 oz)   07/27/17 78.9 kg (174 lb)   07/20/17 79.4 kg (175 lb)   07/13/17 78.5 kg (173 lb)   07/06/17 78.9 kg (174 lb)   06/29/17 78 kg (172 lb)   06/16/17 77.6 kg (171 lb)     Constitutional: Well-appearing male in no acute distress.  Eyes: EOMI, PERRL. No scleral icterus.  ENT: Oral mucosa is moist without lesions or thrush.   Lymphatic: Neck is supple without cervical or supraclavicular lymphadenopathy.   Cardiovascular: Regular rate and rhythm. No murmurs, gallops, or rubs. No peripheral edema. Bilateral radial pulses " 2+.  Respiratory: Clear to auscultation bilaterally. No wheezes or crackles. Mild tenderness in right posterior rib with breathing and on palpation.  Neurologic: Cranial nerves II through XII are grossly intact. Full ROM of right arm and fingers. Equal  strength.  Skin: No rashes, petechiae, or bruising noted on exposed skin.  Musculoskeletal: No obvious abnormalities to right arm. Mild tenderness to palpation of right mid humerus and right elbow. Full ROM of right arm.    Laboratory Data:  Results for ENRIQUETA KAY (MRN [id number]) as of 8/22/2018 18:19   8/22/2018 17:33   Sodium 138   Potassium 4.4   Chloride 104   Carbon Dioxide 31   Urea Nitrogen 15   Creatinine 0.98   GFR Estimate 83   GFR Estimate If Black >90   Calcium 9.5   Anion Gap 3   Albumin 3.8   Protein Total 8.1   Bilirubin Total 0.2   Alkaline Phosphatase 94   ALT 30   AST 18   Glucose 105 (H)   WBC 5.6   Hemoglobin 15.1   Hematocrit 46.7   Platelet Count 276   RBC Count 5.23   MCV 89   MCH 28.9   MCHC 32.3   RDW 13.0   Diff Method Automated Method   % Neutrophils 64.9   % Lymphocytes 24.8   % Monocytes 8.6   % Eosinophils 1.1   % Basophils 0.4   % Immature Granulocytes 0.2   Nucleated RBCs 0   Absolute Neutrophil 3.6   Absolute Lymphocytes 1.4   Absolute Monocytes 0.5   Absolute Eosinophils 0.1   Absolute Basophils 0.0   Abs Immature Granulocytes 0.0   Absolute Nucleated RBC 0.0       Assessment and Plan:  1. History of right humeral plasmocytoma, now with recurrent right arm and right rib pain  S/p surgery May 2017 and radiation to right humerus completed August 2018. No evidence of MM at presentation. Has been following with Dr. Mac with every 3 month labs with no signs of recurrence.    Unclear etiology of his new pains. Did obtain x-rays of right humerus and shoulder and while per my review it appears stable, will defer to radiologist for final read. Given right pleuritic rib pain will get CT to fully assess. No signs of acute  neurovascular injury to require urgent ortho evaluation.    CBC and CMP today all stable. Did recheck all MM labs which are pending.    Plan to follow-up with patient after all imaging and lab results back and I can discuss with Dr. Mac. Pending imaging results may need to see Dr. Hudson back or get MRI, but hold off on that for now.    In the meantime, continue Tylenol 1000mg TID, Advil PRN, and prescription for Oxycodone 5mg PRN #15 tabs given to use only for severe pain. Patient does not like pain meds and states he will only use these if absolutely necessary.    Surinder Bailey PA-C  [facility]  [address] [place] [address] [phone]

## 2018-08-22 NOTE — TELEPHONE ENCOUNTER
[facility] Telephone Triage Note    Assessment: Called patient after he sent in a symptomatic mychart message. He reported the following: Constant pain since 8/16 to RUE, shoulder/R back. Pain waxes and wanes from severe to mild. Last night pain was severe and he had difficulty sleeping. This morning describes the discomfort as a dull ache. He also says last night his R upper arm felf numb and tingling and upper arm feels tight. Likens the tightness to the sensation of having a blood pressure cuff squeezing the arm. He thinks the plate and screws he has to upper arm could be pressing on surrounding nerves. Saw chiropractor yesterday who felt the pain was either nerve or bone related. Did not believe it was due to a rotator cuff injury. Pain did decrease on 8/20-8/21 for no apparent reason. He has been taking 6-7 Tylenol 500mg per day. Per patient ibuprofen does not seem helpful. Due to hx of plasmacytoma to RUE offered patient clinic appointment with Surinder on 8/24. .    Recommendations: .  Follow up in clinic on 8/24. Patient said he may call his PCP to see if he can get an earlier appointment and xray. .    Follow-Up: Paged Surinder as I saw she has a 6:00pm appointment available today.     Per Surinder: Ok to see patient today, would want labs and xray prior. If patient does not want this appointment time or prefers to see PCP with xray that is also OK, would want images pushed to our clinic so we can order MRI if needed.     Called Vince back, he would like to have x-ray/labs/appt with Surinder today. Message to scheduling for 5:00pm x-ray, 5:30pm labs, 6:00pm appointment with Surinder.

## 2018-08-22 NOTE — MR AVS SNAPSHOT
After Visit Summary   8/22/2018    Vince Krueger    MRN: [id number]           Patient Information     Date Of Birth          1975        Visit Information        Provider Department      8/22/2018 6:00 PM Surinder Bailey PA [facility]        Today's Diagnoses     Plasmacytoma of bone (H)    -  1    Rib pain on right side           Follow-ups after your visit        Your next 10 appointments already scheduled     Aug 22, 2018  8:00 PM CDT   CT CHEST W/O CONTRAST with UCCT2   [facility] CT ([facility])    [address] [place] [address]  1st Floor  [facility] [id number] [phone]           Please bring any scans or X-rays taken at other hospitals, if similar tests were done. Also bring a list of your medicines, including vitamins, minerals and over-the-counter drugs. It is safest to leave personal items at home.  Be sure to tell your doctor:   If you have any allergies.   If there s any chance you are pregnant.   If you are breastfeeding.  You do not need to do anything special to prepare for this exam.  Please wear loose clothing, such as a sweat suit or jogging clothes. Avoid snaps, zippers and other metal. We may ask you to undress and put on a hospital gown.            Oct 04, 2018  2:45 PM CDT   [facility] Lab Draw with  [facility] LAB DRAW   [facility] Draw ([facility])    [address] [place] [address]  [address]  [facility] [id number] [phone]            Oct 04, 2018  3:15 PM CDT   (Arrive by 3:00 PM)   Return Visit with Leonel Mac MD   [facility]    [address] [place] [address]  [address]  [facility] [address] [phone]              Who to contact     If you have questions or need follow up information about today's clinic visit or your schedule please contact [facility] directly at [phone].  Normal or  "non-critical lab and imaging results will be communicated to you by MyChart, letter or phone within 4 business days after the clinic has received the results. If you do not hear from us within 7 days, please contact the clinic through Baboo or phone. If you have a critical or abnormal lab result, we will notify you by phone as soon as possible.  Submit refill requests through Baboo or call your pharmacy and they will forward the refill request to us. Please allow 3 business days for your refill to be completed.          Additional Information About Your Visit        Baboo Information     Baboo gives you secure access to your electronic health record. If you see a primary care provider, you can also send messages to your care team and make appointments. If you have questions, please call your primary care clinic.  If you do not have a primary care provider, please call [phone] and they will assist you.        Care EveryWhere ID     This is your Care EveryWhere ID. This could be used by other organizations to access your [facility] medical records  [id number]        Your Vitals Were     Pulse Temperature Respirations Height Pulse Oximetry BMI (Body Mass Index)    94 98  F (36.7  C) (Oral) 16 1.803 m (5' 10.98\") 98% 25.14 kg/m2       Blood Pressure from Last 3 Encounters:   08/22/18 132/89   05/03/18 121/76   01/31/18 133/81    Weight from Last 3 Encounters:   08/22/18 81.7 kg (180 lb 3.2 oz)   05/03/18 83.1 kg (183 lb 4.8 oz)   01/31/18 82.1 kg (180 lb 14.4 oz)                 Today's Medication Changes          These changes are accurate as of 8/22/18  6:25 PM.  If you have any questions, ask your nurse or doctor.               Start taking these medicines.        Dose/Directions    oxyCODONE IR 5 MG tablet   Commonly known as:  ROXICODONE   Used for:  Plasmacytoma of bone (H)   Started by:  Surinder Bailey PA        Dose:  5 mg   Take 1 tablet (5 mg) by mouth every 6 hours as needed for severe pain "   Quantity:  15 tablet   Refills:  0            Where to get your medicines      Some of these will need a paper prescription and others can be bought over the counter.  Ask your nurse if you have questions.     Bring a paper prescription for each of these medications     oxyCODONE IR 5 MG tablet               Information about OPIOIDS     PRESCRIPTION OPIOIDS: WHAT YOU NEED TO KNOW   We gave you an opioid (narcotic) pain medicine. It is important to manage your pain, but opioids are not always the best choice. You should first try all the other options your care team gave you. Take this medicine for as short a time (and as few doses) as possible.    Some activities can increase your pain, such as bandage changes or therapy sessions. It may help to take your pain medicine 30 to 60 minutes before these activities. Reduce your stress by getting enough sleep, working on hobbies you enjoy and practicing relaxation or meditation. Talk to your care team about ways to manage your pain beyond prescription opioids.    These medicines have risks:    DO NOT drive when on new or higher doses of pain medicine. These medicines can affect your alertness and reaction times, and you could be arrested for driving under the influence (DUI). If you need to use opioids long-term, talk to your care team about driving.    DO NOT operate heavy machinery    DO NOT do any other dangerous activities while taking these medicines.    DO NOT drink any alcohol while taking these medicines.     If the opioid prescribed includes acetaminophen, DO NOT take with any other medicines that contain acetaminophen. Read all labels carefully. Look for the word  acetaminophen  or  Tylenol.  Ask your pharmacist if you have questions or are unsure.    You can get addicted to pain medicines, especially if you have a history of addiction (chemical, alcohol or substance dependence). Talk to your care team about ways to reduce this risk.    All opioids tend to  cause constipation. Drink plenty of water and eat foods that have a lot of fiber, such as fruits, vegetables, prune juice, apple juice and high-fiber cereal. Take a laxative (Miralax, milk of magnesia, Colace, Senna) if you don t move your bowels at least every other day. Other side effects include upset stomach, sleepiness, dizziness, throwing up, tolerance (needing more of the medicine to have the same effect), physical dependence and slowed breathing.    Store your pills in a secure place, locked if possible. We will not replace any lost or stolen medicine. If you don t finish your medicine, please throw away (dispose) as directed by your pharmacist. The [place] Pollution Control Agency has more information about safe disposal: https://www."[employer].[email or web address]/living-[email or web address]/[email or web address]-medications         Primary Care Provider Office Phone # Fax #    Tanya MIGUEL ÁNGEL Wen [phone]       [facility] [address] [place] [address]        Equal Access to Services     [facility] : Hadii lois ku hadasho Soomaali, waaxda luqadaha, qaybta kaalmada adeegyada, leonardo pineda . So [facility] [phone].    ATENCIÓN: Si habla español, tiene a javier disposición servicios gratuitos de asistencia lingüística. Llame al [phone].    We comply with applicable federal civil rights laws and [place] laws. We do not discriminate on the basis of race, color, national origin, age, disability, sex, sexual orientation, or gender identity.            Thank you!     Thank you for choosing [facility]  for your care. Our goal is always to provide you with excellent care. Hearing back from our patients is one way we can continue to improve our services. Please take a few minutes to complete the written survey that you may receive in the mail after your visit with us. Thank you!             Your Updated Medication List - Protect others around you: Learn how to  safely use, store and throw away your medicines at www.disposemymeds.org.          This list is accurate as of 8/22/18  6:25 PM.  Always use your most recent med list.                   Brand Name Dispense Instructions for use Diagnosis    ADVIL PO      Take 200 mg by mouth every 6 hours as needed for moderate pain        KROGER TEST STRIPS test strip   Generic drug:  blood glucose monitoring      Dispense meter, test strips, lancets covered by pt ins. Hypoglycemia.  Accu check Marjorie connect. Testing once daily    Plasmacytoma of bone (H)       loratadine 10 MG tablet    CLARITIN     Take 10 mg by mouth as needed    Lesion of right humerus       MULTIVITAMIN ADULT Chew      Take by mouth daily        oxyCODONE IR 5 MG tablet    ROXICODONE    15 tablet    Take 1 tablet (5 mg) by mouth every 6 hours as needed for severe pain    Plasmacytoma of bone (H)       RA OMEPRAZOLE 20 MG tablet   Generic drug:  omeprazole      Take 20 mg by mouth    Plasmacytoma of bone (H)

## 2018-08-22 NOTE — NURSING NOTE
"Oncology Rooming Note    August 22, 2018 5:44 PM   Vince Krueger is a 43 year old male who presents for:    Chief Complaint   Patient presents with     Oncology Clinic Visit     return - myeloma - right arm pain      Initial Vitals: /89 (BP Location: Right arm, Patient Position: Chair, Cuff Size: Adult Regular)  Pulse 94  Temp 98  F (36.7  C) (Oral)  Resp 16  Ht 1.803 m (5' 10.98\")  Wt 81.7 kg (180 lb 3.2 oz)  SpO2 98%  BMI 25.14 kg/m2 Estimated body mass index is 25.14 kg/(m^2) as calculated from the following:    Height as of this encounter: 1.803 m (5' 10.98\").    Weight as of this encounter: 81.7 kg (180 lb 3.2 oz). Body surface area is 2.02 meters squared.  Mild Pain (3) Comment: Data Unavailable   No LMP for male patient.  Allergies reviewed: Yes  Medications reviewed: Yes    Medications: Medication refills not needed today.  Pharmacy name entered into EPIC: [id number] - [address]    Clinical concerns: right arm pain      6 minutes for nursing intake (face to face time)     Nataly Malik CMA            "

## 2018-08-22 NOTE — LETTER
8/22/2018      RE: Vince Krueger  [address] [facility] [address]       Oncology/Hematology Visit Note  Aug 22, 2018    Reason for Visit: Add on right arm pain    History of Present Illness: Vince Krueger is a 43 year old male with a history of right humeral solitary plasmacytoma. He previously followed with Dr. Panda and now sees Dr. Mac for follow-up. He was diagnosed in May 2017 after presenting with several months of right arm pain. He was found to have a solitary plasmacytoma of the right humerus. He underwent surgery with Dr. Hudson followed by radiation therapy. He had no evidence of myeloma at time of presentation. Plan was to follow his labs every 3 months the first year and then space out to 6 months following that. He last saw Dr. Mac 5/3/18 when he was feeling labs and all myeloma labs WNL.     He called in with worsening right arm pain which is why I am seeing him today.    Interval History:  Mr. Krueger returns to clinic today with his wife. He states that while he always has had mild pain in his right arm since surgery, since last Thursday he has had acute worsening of his pain. The pain is mainly located in his mid right humerus but it radiates into his right elbow and right shoulder. The pain varies in intensity-was severe last night but better today. He had numbness in his left hand last night but this is resolved. He notes mild weakness in the sense he has more pain when he has to use the arm. No problems with circulation. He cannot recall any trauma to the area or trigger. He still has full ROM of arm. He has been using Tylenol 1000mg TID along with PRN Advil and some old hydrocodone for pain control. The Tylenol tends to work well but there have been a couple of nights, including last night, where the pain was so severe he couldn't sleep. He also saw his chiropractor for this who felt it was not muscular in nature and recommended he call here.    He also admits to appx 1 months of  "right posterior rib pain that is worse with deep breath and is tender to palpation. No shortness of breath, other chest pain, or coughing. No fevers, chills, or sweats. No recent infections. He denies any other areas of new pains.    He states he is eating and drinking well. No urinary concerns. Has a history of GI symptoms with N/V and prior abram fundoplication procedure but this is a chronic issue and he denies wanting to see his prior GI team about it. No bleeding issues. No rashes or swelling.      Current Outpatient Prescriptions   Medication Sig Dispense Refill     blood glucose monitoring (KROGER TEST STRIPS) test strip Dispense meter, test strips, lancets covered by pt ins. Hypoglycemia.  Accu check Marjorie connect. Testing once daily       Ibuprofen (ADVIL PO) Take 200 mg by mouth every 6 hours as needed for moderate pain       loratadine (CLARITIN) 10 MG tablet Take 10 mg by mouth as needed        Multiple Vitamins-Minerals (MULTIVITAMIN ADULT) CHEW Take by mouth daily       omeprazole (RA OMEPRAZOLE) 20 MG tablet Take 20 mg by mouth         Physical Examination:  /89 (BP Location: Right arm, Patient Position: Chair, Cuff Size: Adult Regular)  Pulse 94  Temp 98  F (36.7  C) (Oral)  Resp 16  Ht 1.803 m (5' 10.98\")  Wt 81.7 kg (180 lb 3.2 oz)  SpO2 98%  BMI 25.14 kg/m2  Wt Readings from Last 10 Encounters:   05/03/18 83.1 kg (183 lb 4.8 oz)   01/31/18 82.1 kg (180 lb 14.4 oz)   12/14/17 82.8 kg (182 lb 9.6 oz)   09/27/17 80.9 kg (178 lb 4.8 oz)   07/27/17 78.9 kg (174 lb)   07/20/17 79.4 kg (175 lb)   07/13/17 78.5 kg (173 lb)   07/06/17 78.9 kg (174 lb)   06/29/17 78 kg (172 lb)   06/16/17 77.6 kg (171 lb)     Constitutional: Well-appearing male in no acute distress.  Eyes: EOMI, PERRL. No scleral icterus.  ENT: Oral mucosa is moist without lesions or thrush.   Lymphatic: Neck is supple without cervical or supraclavicular lymphadenopathy.   Cardiovascular: Regular rate and rhythm. No murmurs, " gallops, or rubs. No peripheral edema. Bilateral radial pulses 2+.  Respiratory: Clear to auscultation bilaterally. No wheezes or crackles. Mild tenderness in right posterior rib with breathing and on palpation.  Neurologic: Cranial nerves II through XII are grossly intact. Full ROM of right arm and fingers. Equal  strength.  Skin: No rashes, petechiae, or bruising noted on exposed skin.  Musculoskeletal: No obvious abnormalities to right arm. Mild tenderness to palpation of right mid humerus and right elbow. Full ROM of right arm.    Laboratory Data:  Results for ENRIQUETA KAY (MRN [id number]) as of 8/22/2018 18:19   8/22/2018 17:33   Sodium 138   Potassium 4.4   Chloride 104   Carbon Dioxide 31   Urea Nitrogen 15   Creatinine 0.98   GFR Estimate 83   GFR Estimate If Black >90   Calcium 9.5   Anion Gap 3   Albumin 3.8   Protein Total 8.1   Bilirubin Total 0.2   Alkaline Phosphatase 94   ALT 30   AST 18   Glucose 105 (H)   WBC 5.6   Hemoglobin 15.1   Hematocrit 46.7   Platelet Count 276   RBC Count 5.23   MCV 89   MCH 28.9   MCHC 32.3   RDW 13.0   Diff Method Automated Method   % Neutrophils 64.9   % Lymphocytes 24.8   % Monocytes 8.6   % Eosinophils 1.1   % Basophils 0.4   % Immature Granulocytes 0.2   Nucleated RBCs 0   Absolute Neutrophil 3.6   Absolute Lymphocytes 1.4   Absolute Monocytes 0.5   Absolute Eosinophils 0.1   Absolute Basophils 0.0   Abs Immature Granulocytes 0.0   Absolute Nucleated RBC 0.0       Assessment and Plan:  1. History of right humeral plasmocytoma, now with recurrent right arm and right rib pain  S/p surgery May 2017 and radiation to right humerus completed August 2018. No evidence of MM at presentation. Has been following with Dr. Mac with every 3 month labs with no signs of recurrence.    Unclear etiology of his new pains. Did obtain x-rays of right humerus and shoulder and while per my review it appears stable, will defer to radiologist for final read. Given right pleuritic rib  pain will get CT to fully assess. No signs of acute neurovascular injury to require urgent ortho evaluation.    CBC and CMP today all stable. Did recheck all MM labs which are pending.    Plan to follow-up with patient after all imaging and lab results back and I can discuss with Dr. Mac. Pending imaging results may need to see Dr. Hudson back or get MRI, but hold off on that for now.    In the meantime, continue Tylenol 1000mg TID, Advil PRN, and prescription for Oxycodone 5mg PRN #15 tabs given to use only for severe pain. Patient does not like pain meds and states he will only use these if absolutely necessary.    Surinder Bailey PA-C  [facility]  [address] [place], [place] [address] [phone]      ERIKA Fay

## 2018-08-23 ENCOUNTER — TELEPHONE (OUTPATIENT)
Dept: ONCOLOGY | Facility: CLINIC | Age: 43
End: 2018-08-23

## 2018-08-23 LAB
ALBUMIN SERPL ELPH-MCNC: 4.1 G/DL (ref 3.7–5.1)
ALPHA1 GLOB SERPL ELPH-MCNC: 0.4 G/DL (ref 0.2–0.4)
ALPHA2 GLOB SERPL ELPH-MCNC: 0.9 G/DL (ref 0.5–0.9)
B-GLOBULIN SERPL ELPH-MCNC: 1 G/DL (ref 0.6–1)
GAMMA GLOB SERPL ELPH-MCNC: 1.2 G/DL (ref 0.7–1.6)
IGA SERPL-MCNC: 311 MG/DL (ref 70–380)
IGG SERPL-MCNC: 1260 MG/DL (ref 695–1620)
IGM SERPL-MCNC: 102 MG/DL (ref 60–265)
KAPPA LC UR-MCNC: 0.84 MG/DL (ref 0.33–1.94)
KAPPA LC/LAMBDA SER: 0.68 {RATIO} (ref 0.26–1.65)
LAMBDA LC SERPL-MCNC: 1.23 MG/DL (ref 0.57–2.63)
M PROTEIN SERPL ELPH-MCNC: 0 G/DL
PROT PATTERN SERPL ELPH-IMP: NORMAL
PROT PATTERN SERPL IFE-IMP: NORMAL

## 2018-08-24 NOTE — TELEPHONE ENCOUNTER
8/23/18    Called patient to discuss his lab and imaging results. All MM labs were stable with no concerning findings. His right arm and shoulder x-rays were re-assuring and there were no masses or rib lesions on the CT Chest. I had discussed with Dr. Mac who agreed to refer patient back to Dr. Hudson to discuss the arm pain given our reassuring work-up from a plasmocytoma/MM perspective. We will plan on seeing him back in October as planned, but of course if new symptoms were to arise before then he should call. Patient was happy to hear everything came back negative and was agreeable to this plan. He appreciated the call. All questions answered.    Surinder Bailey PA-C

## 2018-08-29 ENCOUNTER — OFFICE VISIT (OUTPATIENT)
Dept: ORTHOPEDICS | Facility: CLINIC | Age: 43
End: 2018-08-29
Payer: COMMERCIAL

## 2018-08-29 VITALS — WEIGHT: 184.5 LBS | BODY MASS INDEX: 24.99 KG/M2 | HEIGHT: 72 IN

## 2018-08-29 DIAGNOSIS — C90.30 PLASMACYTOMA OF BONE (H): Primary | ICD-10-CM

## 2018-08-29 ASSESSMENT — ENCOUNTER SYMPTOMS
JAUNDICE: 0
HEARTBURN: 1
POLYDIPSIA: 0
WEIGHT LOSS: 0
WEIGHT GAIN: 0
BOWEL INCONTINENCE: 0
POLYPHAGIA: 0
ABDOMINAL PAIN: 0
HALLUCINATIONS: 0
JOINT SWELLING: 1
DECREASED APPETITE: 0
NECK PAIN: 1
NIGHT SWEATS: 0
DIARRHEA: 1
NAUSEA: 1
MUSCLE CRAMPS: 0
BACK PAIN: 1
FATIGUE: 1
ARTHRALGIAS: 1
RECTAL PAIN: 0
INCREASED ENERGY: 1
FEVER: 0
CONSTIPATION: 0
MUSCLE WEAKNESS: 1
VOMITING: 1
ALTERED TEMPERATURE REGULATION: 0
CHILLS: 0
STIFFNESS: 1
BLOATING: 0
MYALGIAS: 1
BLOOD IN STOOL: 0

## 2018-08-29 NOTE — NURSING NOTE
"Chief Complaint   Patient presents with     Right Arm - Pain, Follow Up For     Pain that radiates into back and down the shoulder to the hand       43 year old  1975    Ht 1.83 m (6' 0.05\")  Wt 83.7 kg (184 lb 8 oz)  BMI 24.99 kg/m2      Date/Surgery/Surgeon/Hospital:  5/19/17: P, Dr. Hudson, Biopsy, Curettage and Internal Fixation Right Humerus, and Supplemental Fixation with Zometa-Impregnated Cement                         Pain Assessment  Patient Currently in Pain: Yes  0-10 Pain Scale: 3  Primary Pain Location: Arm  Pain Orientation: Right  Pain Descriptors: Aching  Alleviating Factors: NSAIDS (Tylenol)  Aggravating Factors: Movement                         [id number] - [place], [place] - [address]    Allergies   Allergen Reactions     No Clinical Screening - See Comments Hives     Palms and bottom of feet itching and hives     Penicillin G Hives     Swelling and turned red     Penicillins Hives, Rash and Swelling     Current Outpatient Prescriptions   Medication     blood glucose monitoring (KROGER TEST STRIPS) test strip     Ibuprofen (ADVIL PO)     loratadine (CLARITIN) 10 MG tablet     Multiple Vitamins-Minerals (MULTIVITAMIN ADULT) CHEW     omeprazole (RA OMEPRAZOLE) 20 MG tablet     oxyCODONE IR (ROXICODONE) 5 MG tablet     No current facility-administered medications for this visit.          Questionnaires:  HOOS Hip Dysfunction & Osteoarthritis Outcome Questionnaire    No flowsheet data found.         KOOS Knee Survey Assessment    No flowsheet data found.         Promis 10 Assessment    No flowsheet data found.         Ortho Oxford Knee Questionnaire    No flowsheet data found.                 "

## 2018-08-29 NOTE — LETTER
8/29/2018       RE: Vince Kay  [address] [facility] [address]     Dear Colleague,    Thank you for referring your patient, Vince Kay, to the [facility]. Please see a copy of my visit note below.      Orthopaedic Oncology clinic visit - TOÑO Hudson MD    Requesting physician: Gerogi Hammond MD [facility]  Dr. Marlon Pulliam, [facility]  Dr. Radames Alvares [facility]  Tanya Wen NP primary care, [facility], [facility]     Diagnosis:  1. 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 5/19/2017, biopsy, tumor excision, ORIF with zometa impregnated cement (Tristan) [facility]  2. June-July 2017, external beam RT to R humerus. (dr. Jasmine) [facility]. 4500 cGy, 25 fractions.  No chemo    Interval History:  Began having R arm pain since 1.5 weeks, no trauma. Nocturnal pain (+).  Able to perform daily activities but painful. Taking tylenol only.  Also has had R posterior rib pain x 4 mos.  No injury.  Saw chiropractor.  Saw Dr. Mac, no evidence of relapse based upon blood labs.    XR (-).  Chest CT, small stable lung nodules, see below.    EXAM: incision healed.  Radial, median, ulnar nn intact motor function  Elbow -5-110 deg arc of motion  Forearm 90/90 sup and pronation  Shoulder forward flexion 170 . Abduction 160 .    Data:  Results for VINCE KAY (MRN [id number]) as of 8/29/2018 07:59   Ref. Range 5/3/2018 15:17 8/22/2018 17:33   Albumin Fraction Latest Ref Range: 3.7 - 5.1 g/dL 4.5 4.1   Alpha 1 Fraction Latest Ref Range: 0.2 - 0.4 g/dL 0.3 0.4   Alpha 2 Fraction Latest Ref Range: 0.5 - 0.9 g/dL 0.6 0.9   Beta Fraction Latest Ref Range: 0.6 - 1.0 g/dL 0.9 1.0   ELP Interpretation: Unknown Essentially pooja... Essentially pooja...   Gamma Fraction Latest Ref Range: 0.7 - 1.6 g/dL 1.2 1.2   IGA Latest Ref Range: 70 - 380 mg/dL 268 311   IGG Latest Ref Range:  695 - 1620 mg/dL 1250 1260   IGM Latest Ref Range: 60 - 265 mg/dL 103 102   Immunofixation ELP Unknown No monoclonal pro... No monoclonal pro...   Kappa Free Lt Chain Latest Ref Range: 0.33 - 1.94 mg/dL 0.81 0.84   Kappa Lambda Ratio Latest Ref Range: 0.26 - 1.65  0.52 0.68   Lambda Free Lt Chain Latest Ref Range: 0.57 - 2.63 mg/dL 1.56 1.23   Monoclonal Peak Latest Ref Range: 0.0 g/dL 0.0 0.0       XR: intact fixation. Some cortical lucencies noted and slightly larger than prior radiographs. No significant ostial lysis about the implant.   EXAM: CT CHEST W/O CONTRAST . 8/22/2018 6:48 PM     TECHNIQUE: Helical CT images from the thoracic inlet through the  upper abdomen were obtained without intravenous contrast.     COMPARISON: PET-CT 6/1/2017    HISTORY: history of plasmocytoma, new right pleuritic rib pain, r/o  myeloma recurrence; Plasmacytoma of bone (H); Rib pain on right side     FINDINGS:  CHEST:  LUNGS: No mass or consolidation. Unchanged sub-6 mm pulmonary nodules,  similar to PET-CT 6/1/2017, for example 3 mm possibly calcified right  lower lobe nodule (series 4, image 186) and 4 mm nodule along the left  major fissure possibly a lymph node (series 4, image 214). No pleural  effusion or pneumothorax. The airway is patent.    MEDIASTINUM: Heart size is within normal limits. No pericardial  effusion. No enlarged mediastinal or axillary lymph nodes. Visualized  thyroid is unremarkable.    UPPER ABDOMEN: No acute findings in the upper abdomen.    BONES/SOFT TISSUES: Mild degenerative changes of the spine. No acute  bony abnormality. No definite abnormal lytic lesion identified.    IMPRESSION:   1. No osseous abnormality identified in the chest. No chest wall mass.    2. Unchanged scattered sub-6 mm pulmonary nodules, similar to PET-CT  6/1/2017.    I have personally reviewed the examination and initial interpretation  and I agree with the findings.    NACHO RAUSCH MD   Principal   Name: NACHO  MIRACLE  Provider ID: [id number]  IMP:  R arm pain of uncertain etiology.  Concern would be for relapsed disease.  Some remodeling along cortical bone but otherwise unremarkable XR.  No evidence of non-union, mal-union or other musculoskeletal problem based on XR.    Plan:  1. Obtain MRI examination of R humerus with MARS sequence to evaluate for occult relapse of plasmacytoma.  IF MR suboptimal , may need to resort to doing CT instead.  2. Function of the right upper extremity is excellent although not symmetric with the contralateral side. I cannot think of any intervention that would improve the function of his right upper extremity other than aquatic strengthening exercises aquatic-based strengthening exercises and continued range of motion. Given the prior radiation and the extent of the surgical procedure I think he has an excellent outcome.       John Hudson MD  [facility] Family Professor  Oncology and Adult Reconstructive Surgery  Dept Orthopaedic Surgery, [facility]  [phone] office, [phone] pager  [email or web address]    Total Time = 25 min, 50% of which was spent in counseling and coordination of care as documented above.

## 2018-08-29 NOTE — PROGRESS NOTES
Orthopaedic Oncology clinic visit - TOÑO Hudson MD    Requesting physician: Georgi Hammond MD [facility]  Dr. Marlon Pulliam, [facility]  Dr. Radames Alvares [facility]  Tanya Wen NP primary care, [facility] DC, [facility]     Diagnosis:  1. 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 5/19/2017, biopsy, tumor excision, ORIF with zometa impregnated cement (Tristan) [facility]  2. June-July 2017, external beam RT to R humerus. (dr. Jasmine) [facility]. 4500 cGy, 25 fractions.  No chemo    Interval History:  Began having R arm pain since 1.5 weeks, no trauma. Nocturnal pain (+).  Able to perform daily activities but painful. Taking tylenol only.  Also has had R posterior rib pain x 4 mos.  No injury.  Saw chiropractor.  Saw Dr. Mac, no evidence of relapse based upon blood labs.    XR (-).  Chest CT, small stable lung nodules, see below.    EXAM: incision healed.  Radial, median, ulnar nn intact motor function  Elbow -5-110 deg arc of motion  Forearm 90/90 sup and pronation  Shoulder forward flexion 170 . Abduction 160 .    Data:  Results for ENRIQUETA KAY (MRN [id number]) as of 8/29/2018 07:59   Ref. Range 5/3/2018 15:17 8/22/2018 17:33   Albumin Fraction Latest Ref Range: 3.7 - 5.1 g/dL 4.5 4.1   Alpha 1 Fraction Latest Ref Range: 0.2 - 0.4 g/dL 0.3 0.4   Alpha 2 Fraction Latest Ref Range: 0.5 - 0.9 g/dL 0.6 0.9   Beta Fraction Latest Ref Range: 0.6 - 1.0 g/dL 0.9 1.0   ELP Interpretation: Unknown Essentially pooja... Essentially pooja...   Gamma Fraction Latest Ref Range: 0.7 - 1.6 g/dL 1.2 1.2   IGA Latest Ref Range: 70 - 380 mg/dL 268 311   IGG Latest Ref Range: 695 - 1620 mg/dL 1250 1260   IGM Latest Ref Range: 60 - 265 mg/dL 103 102   Immunofixation ELP Unknown No monoclonal pro... No monoclonal pro...   Kappa Free Lt Chain Latest Ref Range: 0.33 - 1.94 mg/dL 0.81 0.84   Kappa Lambda Ratio Latest Ref Range: 0.26 - 1.65  0.52 0.68    Lambda Free Lt Chain Latest Ref Range: 0.57 - 2.63 mg/dL 1.56 1.23   Monoclonal Peak Latest Ref Range: 0.0 g/dL 0.0 0.0       XR: intact fixation. Some cortical lucencies noted and slightly larger than prior radiographs. No significant ostial lysis about the implant.   EXAM: CT CHEST W/O CONTRAST . 8/22/2018 6:48 PM     TECHNIQUE: Helical CT images from the thoracic inlet through the  upper abdomen were obtained without intravenous contrast.     COMPARISON: PET-CT 6/1/2017    HISTORY: history of plasmocytoma, new right pleuritic rib pain, r/o  myeloma recurrence; Plasmacytoma of bone (H); Rib pain on right side     FINDINGS:  CHEST:  LUNGS: No mass or consolidation. Unchanged sub-6 mm pulmonary nodules,  similar to PET-CT 6/1/2017, for example 3 mm possibly calcified right  lower lobe nodule (series 4, image 186) and 4 mm nodule along the left  major fissure possibly a lymph node (series 4, image 214). No pleural  effusion or pneumothorax. The airway is patent.    MEDIASTINUM: Heart size is within normal limits. No pericardial  effusion. No enlarged mediastinal or axillary lymph nodes. Visualized  thyroid is unremarkable.    UPPER ABDOMEN: No acute findings in the upper abdomen.    BONES/SOFT TISSUES: Mild degenerative changes of the spine. No acute  bony abnormality. No definite abnormal lytic lesion identified.    IMPRESSION:   1. No osseous abnormality identified in the chest. No chest wall mass.    2. Unchanged scattered sub-6 mm pulmonary nodules, similar to PET-CT  6/1/2017.    I have personally reviewed the examination and initial interpretation  and I agree with the findings.    NACHO RAUSCH MD   Principal   Name: NACHO RAUSCH  Provider ID: [id number]  IMP:  R arm pain of uncertain etiology.  Concern would be for relapsed disease.  Some remodeling along cortical bone but otherwise unremarkable XR.  No evidence of non-union, mal-union or other musculoskeletal problem based on  XR.    Plan:  1. Obtain MRI examination of R humerus with MARS sequence to evaluate for occult relapse of plasmacytoma.  IF MR suboptimal , may need to resort to doing CT instead.  2. Function of the right upper extremity is excellent although not symmetric with the contralateral side. I cannot think of any intervention that would improve the function of his right upper extremity other than aquatic strengthening exercises aquatic-based strengthening exercises and continued range of motion. Given the prior radiation and the extent of the surgical procedure I think he has an excellent outcome.       John Hudson MD  [facility] Family Professor  Oncology and Adult Reconstructive Surgery  Dept Orthopaedic Surgery, [facility]  [phone] office, [phone] pager  [email or web address]    Total Time = 25 min, 50% of which was spent in counseling and coordination of care as documented above.        Answers for HPI/ROS submitted by the patient on 8/29/2018   General Symptoms: Yes  Skin Symptoms: No  HENT Symptoms: No  EYE SYMPTOMS: No  HEART SYMPTOMS: No  LUNG SYMPTOMS: No  INTESTINAL SYMPTOMS: Yes  URINARY SYMPTOMS: No  REPRODUCTIVE SYMPTOMS: No  SKELETAL SYMPTOMS: Yes  BLOOD SYMPTOMS: No  NERVOUS SYSTEM SYMPTOMS: No  MENTAL HEALTH SYMPTOMS: No  Fever: No  Loss of appetite: No  Weight loss: No  Weight gain: No  Fatigue: Yes  Night sweats: No  Chills: No  Increased stress: No  Excessive hunger: No  Excessive thirst: No  Feeling hot or cold when others believe the temperature is normal: No  Loss of height: No  Post-operative complications: No  Surgical site pain: Yes  Hallucinations: No  Change in or Loss of Energy: Yes  Hyperactivity: No  Confusion: No  Heart burn or indigestion: Yes  Nausea: Yes  Vomiting: Yes  Abdominal pain: No  Bloating: No  Constipation: No  Diarrhea: Yes  Blood in stool: No  Black stools: No  Rectal or Anal pain: No  Fecal incontinence: No  Yellowing of skin or eyes: No  Vomit with blood:  No  Change in stools: No  Back pain: Yes  Muscle aches: Yes  Neck pain: Yes  Swollen joints: Yes  Joint pain: Yes  Bone pain: Yes  Muscle cramps: No  Muscle weakness: Yes  Joint stiffness: Yes  Bone fracture: No  PHQ-2 Score: 0

## 2018-08-29 NOTE — MR AVS SNAPSHOT
After Visit Summary   8/29/2018    Vince Kureger    MRN: [id number]           Patient Information     Date Of Birth          1975        Visit Information        Provider Department      8/29/2018 7:45 AM John Hudson MD [facility]        Today's Diagnoses     Plasmacytoma of bone (H)    -  1      Care Instructions    MRI right humerus - call with results          Follow-ups after your visit        Your next 10 appointments already scheduled     Sep 06, 2018 10:15 AM CDT   MR HUMERUS UPPER ARM RIGHT W CONTRAST with MGMR1   [facility] ([facility])    [address] [place] [address] [phone]           Take your medicines as usual, unless your doctor tells you not to. Bring a list of your current medicines to your exam (including vitamins, minerals and over-the-counter drugs).  You may or may not receive intravenous (IV) contrast for this exam pending the discretion of the Radiologist.  You do not need to do anything special to prepare.  The MRI machine uses a strong magnet. Please wear clothes without metal (snaps, zippers). A sweatsuit works well, or we may give you a hospital gown.  Please remove any body piercings and hair extensions before you arrive. You will also remove watches, jewelry, hairpins, wallets, dentures, partial dental plates and hearing aids. You may wear contact lenses, and you may be able to wear your rings. We have a safe place to keep your personal items, but it is safer to leave them at home.  **IMPORTANT** THE INSTRUCTIONS BELOW ARE ONLY FOR THOSE PATIENTS WHO HAVE BEEN PRESCRIBED SEDATION OR GENERAL ANESTHESIA DURING THEIR MRI PROCEDURE:  IF YOUR DOCTOR PRESCRIBED ORAL SEDATION (take medicine to help you relax during your exam):   You must get the medicine from your doctor (oral medication) before you arrive. Bring the medicine to the exam. Do not take it at home. You ll be told when to take it upon  arriving for your exam.   Arrive one hour early. Bring someone who can take you home after the test. Your medicine will make you sleepy. After the exam, you may not drive, take a bus or take a taxi by yourself.  IF YOUR DOCTOR PRESCRIBED IV SEDATION:   Arrive one hour early. Bring someone who can take you home after the test. Your medicine will make you sleepy. After the exam, you may not drive, take a bus or take a taxi by yourself.   No eating 6 hours before your exam. You may have clear liquids up until 4 hours before your exam. (Clear liquids include water, clear tea, black coffee and fruit juice without pulp.)  IF YOUR DOCTOR PRESCRIBED ANESTHESIA (be asleep for your exam):   Arrive 1 1/2 hours early. Bring someone who can take you home after the test. You may not drive, take a bus or take a taxi by yourself.   No eating 8 hours before your exam. You may have clear liquids up until 4 hours before your exam. (Clear liquids include water, clear tea, black coffee and fruit juice without pulp.)   You will spend four to five hours in the recovery room.  Please call the Imaging Department at your exam site with any questions.            Oct 04, 2018  2:45 PM CDT   [facility] Lab Draw with  [facility] DRAW   [facility] ([facility])    [address] [place] [address]  [address] [place] [address] [phone]            Oct 04, 2018  3:15 PM CDT   (Arrive by 3:00 PM)   Return Visit with Leonel Mac MD   [facility] ([facility])    [address] [place] [address]  [address]  [facility] [address] [phone]              Future tests that were ordered for you today     Open Future Orders        Priority Expected Expires Ordered    MR Humerus Upper Arm Right w Contrast Routine  8/29/2019 8/29/2018            Who to contact     Please call your clinic at [phone] to:    Ask questions about your health    Make or cancel  "appointments    Discuss your medicines    Learn about your test results    Speak to your doctor            Additional Information About Your Visit        [employer] Information     [employer] gives you secure access to your electronic health record. If you see a primary care provider, you can also send messages to your care team and make appointments. If you have questions, please call your primary care clinic.  If you do not have a primary care provider, please call [phone] and they will assist you.      [employer] is an electronic gateway that provides easy, online access to your medical records. With [employer], you can request a clinic appointment, read your test results, renew a prescription or communicate with your care team.     To access your existing account, please contact your [facility] or call [phone] for assistance.        Care EveryWhere ID     This is your Care EveryWhere ID. This could be used by other organizations to access your [place] medical records  [id number]        Your Vitals Were     Height BMI (Body Mass Index)                1.83 m (6' 0.05\") 24.99 kg/m2           Blood Pressure from Last 3 Encounters:   08/22/18 132/89   05/03/18 121/76   01/31/18 133/81    Weight from Last 3 Encounters:   08/29/18 83.7 kg (184 lb 8 oz)   08/22/18 81.7 kg (180 lb 3.2 oz)   05/03/18 83.1 kg (183 lb 4.8 oz)               Primary Care Provider Office Phone # Fax #    Tanya WenMIGUEL ÁNGEL [phone]       [facility] [address] [place]  [facility] [address]        Equal Access to Services     GUY CASTRO : Hadii lois soteloo [place], waaxda luqadaha, qaybta kaalmada sulaiman, leonardo garcia. So [facility] [phone].    ATENCIÓN: Si habla español, tiene a javier disposición servicios gratuitos de asistencia lingüística. Llame al [phone].    We comply with applicable federal civil rights laws and [place] laws. We do not " discriminate on the basis of race, color, national origin, age, disability, sex, sexual orientation, or gender identity.            Thank you!     Thank you for choosing [facility]  for your care. Our goal is always to provide you with excellent care. Hearing back from our patients is one way we can continue to improve our services. Please take a few minutes to complete the written survey that you may receive in the mail after your visit with us. Thank you!             Your Updated Medication List - Protect others around you: Learn how to safely use, store and throw away your medicines at www.disposemymeds.org.          This list is accurate as of 8/29/18  8:19 AM.  Always use your most recent med list.                   Brand Name Dispense Instructions for use Diagnosis    ADVIL PO      Take 200 mg by mouth every 6 hours as needed for moderate pain        [employer] TEST STRIPS test strip   Generic drug:  blood glucose monitoring      Dispense meter, test strips, lancets covered by pt ins. Hypoglycemia.  Accu check Marjorie connect. Testing once daily    Plasmacytoma of bone (H)       loratadine 10 MG tablet    CLARITIN     Take 10 mg by mouth as needed    Lesion of right humerus       MULTIVITAMIN ADULT Chew      Take by mouth daily        oxyCODONE IR 5 MG tablet    ROXICODONE    15 tablet    Take 1 tablet (5 mg) by mouth every 6 hours as needed for severe pain    Plasmacytoma of bone (H)       RA OMEPRAZOLE 20 MG tablet   Generic drug:  omeprazole      Take 20 mg by mouth    Plasmacytoma of bone (H)

## 2018-09-06 ENCOUNTER — RADIANT APPOINTMENT (OUTPATIENT)
Dept: MRI IMAGING | Facility: CLINIC | Age: 43
End: 2018-09-06
Attending: ORTHOPAEDIC SURGERY
Payer: COMMERCIAL

## 2018-09-06 DIAGNOSIS — C90.30 PLASMACYTOMA OF BONE (H): ICD-10-CM

## 2018-09-06 PROCEDURE — 73218 MRI UPPER EXTREMITY W/O DYE: CPT | Mod: RT | Performed by: RADIOLOGY

## 2018-09-06 RX ORDER — GADOBUTROL 604.72 MG/ML
10 INJECTION INTRAVENOUS ONCE
Status: DISCONTINUED | OUTPATIENT
Start: 2018-09-06 | End: 2018-09-06 | Stop reason: ALTCHOICE

## 2018-09-17 NOTE — PROGRESS NOTES
Vince, I reviewed the MRI scan and unfortunately the ability for us to see any evidence of the tumor regrowing is markedly compromised by the presence of the metal implants in your arm.  For this reason I think we will need to do a CT scan instead.  Also I think would be helpful if we ask her oncologist whether not there is any laboratory evidence in your blood markers of the tumor regrowing.  Rina Allen can facilitate scheduling your CT scan and will call you  John Hudson MD  9/17/2018  5:46 PM

## 2018-09-18 DIAGNOSIS — C90.30 PLASMACYTOMA OF BONE (H): Primary | ICD-10-CM

## 2018-09-24 ENCOUNTER — MYC MEDICAL ADVICE (OUTPATIENT)
Dept: ONCOLOGY | Facility: CLINIC | Age: 43
End: 2018-09-24

## 2018-09-25 ENCOUNTER — RADIANT APPOINTMENT (OUTPATIENT)
Dept: CT IMAGING | Facility: CLINIC | Age: 43
End: 2018-09-25
Attending: ORTHOPAEDIC SURGERY
Payer: COMMERCIAL

## 2018-09-25 DIAGNOSIS — C90.30 PLASMACYTOMA OF BONE (H): ICD-10-CM

## 2018-09-25 PROCEDURE — 73200 CT UPPER EXTREMITY W/O DYE: CPT | Mod: RT | Performed by: RADIOLOGY

## 2018-09-27 ENCOUNTER — TEAM CONFERENCE (OUTPATIENT)
Dept: ORTHOPEDICS | Facility: CLINIC | Age: 43
End: 2018-09-27

## 2018-09-27 NOTE — PROGRESS NOTES
Mr. Krueger, the CT scan shows some evidence of bony the erosion along the posterior cortex of the humerus.  While this is a concern, I noted that it was also present on the first CT scan performed after your humerus surgery.  The finding may not be new.  I have queried Dr. Partida to see if you think there is any blood laboratory testing evidence of your myeloma recurring.    Bottom line is that , unless your symptoms warrant any type of intervention now, I would recommend serial CT scans to monitor the area in your humerus bone for any evidence of progressive resorption of the bone.    John Hudson MD  9/27/2018  4:50 PM

## 2018-09-27 NOTE — TELEPHONE ENCOUNTER
Inbox message to Dr. Mac:    Leonel, this patient your PA recently and sent the patient to me with concerns about myeloma recurrence in his right humerus after prior curettage and internal fixation.  There is a an area of bony erosion along the posterior cortex which was also present after surgery so it is unclear if this represents a new finding that would be suspicious for recurrence.  I have completed both CT and MRI scan of the right humerus without any definitive answer.    Is there any laboratory evidence of his myeloma recurring?    Thanks, Ed

## 2019-02-13 DIAGNOSIS — C90.30 PLASMACYTOMA OF BONE (H): Primary | ICD-10-CM

## 2019-02-14 ENCOUNTER — APPOINTMENT (OUTPATIENT)
Dept: LAB | Facility: CLINIC | Age: 44
End: 2019-02-14
Attending: INTERNAL MEDICINE
Payer: COMMERCIAL

## 2019-02-14 ENCOUNTER — ONCOLOGY VISIT (OUTPATIENT)
Dept: ONCOLOGY | Facility: CLINIC | Age: 44
End: 2019-02-14
Attending: INTERNAL MEDICINE
Payer: COMMERCIAL

## 2019-02-14 VITALS
OXYGEN SATURATION: 97 % | WEIGHT: 174.7 LBS | SYSTOLIC BLOOD PRESSURE: 120 MMHG | BODY MASS INDEX: 23.66 KG/M2 | HEART RATE: 73 BPM | TEMPERATURE: 98.3 F | HEIGHT: 72 IN | RESPIRATION RATE: 16 BRPM | DIASTOLIC BLOOD PRESSURE: 76 MMHG

## 2019-02-14 DIAGNOSIS — C90.30 PLASMACYTOMA OF BONE (H): ICD-10-CM

## 2019-02-14 LAB
ALBUMIN SERPL-MCNC: 3.8 G/DL (ref 3.4–5)
ALP SERPL-CCNC: 73 U/L (ref 40–150)
ALT SERPL W P-5'-P-CCNC: 24 U/L (ref 0–70)
ANION GAP SERPL CALCULATED.3IONS-SCNC: 7 MMOL/L (ref 3–14)
AST SERPL W P-5'-P-CCNC: 18 U/L (ref 0–45)
BASOPHILS # BLD AUTO: 0 10E9/L (ref 0–0.2)
BASOPHILS NFR BLD AUTO: 0.6 %
BILIRUB SERPL-MCNC: 0.3 MG/DL (ref 0.2–1.3)
BUN SERPL-MCNC: 13 MG/DL (ref 7–30)
CALCIUM SERPL-MCNC: 8.7 MG/DL (ref 8.5–10.1)
CHLORIDE SERPL-SCNC: 104 MMOL/L (ref 94–109)
CO2 SERPL-SCNC: 27 MMOL/L (ref 20–32)
CREAT SERPL-MCNC: 0.93 MG/DL (ref 0.66–1.25)
DIFFERENTIAL METHOD BLD: ABNORMAL
EOSINOPHIL # BLD AUTO: 0 10E9/L (ref 0–0.7)
EOSINOPHIL NFR BLD AUTO: 0.6 %
ERYTHROCYTE [DISTWIDTH] IN BLOOD BY AUTOMATED COUNT: 13.3 % (ref 10–15)
GFR SERPL CREATININE-BSD FRML MDRD: >90 ML/MIN/{1.73_M2}
GLUCOSE SERPL-MCNC: 100 MG/DL (ref 70–99)
HCT VFR BLD AUTO: 47.6 % (ref 40–53)
HGB BLD-MCNC: 14.7 G/DL (ref 13.3–17.7)
IMM GRANULOCYTES # BLD: 0 10E9/L (ref 0–0.4)
IMM GRANULOCYTES NFR BLD: 0.4 %
LYMPHOCYTES # BLD AUTO: 1.3 10E9/L (ref 0.8–5.3)
LYMPHOCYTES NFR BLD AUTO: 24.2 %
MCH RBC QN AUTO: 27.2 PG (ref 26.5–33)
MCHC RBC AUTO-ENTMCNC: 30.9 G/DL (ref 31.5–36.5)
MCV RBC AUTO: 88 FL (ref 78–100)
MONOCYTES # BLD AUTO: 0.4 10E9/L (ref 0–1.3)
MONOCYTES NFR BLD AUTO: 6.9 %
NEUTROPHILS # BLD AUTO: 3.5 10E9/L (ref 1.6–8.3)
NEUTROPHILS NFR BLD AUTO: 67.3 %
NRBC # BLD AUTO: 0 10*3/UL
NRBC BLD AUTO-RTO: 0 /100
PLATELET # BLD AUTO: 251 10E9/L (ref 150–450)
POTASSIUM SERPL-SCNC: 4 MMOL/L (ref 3.4–5.3)
PROT SERPL-MCNC: 7.4 G/DL (ref 6.8–8.8)
RBC # BLD AUTO: 5.41 10E12/L (ref 4.4–5.9)
SODIUM SERPL-SCNC: 138 MMOL/L (ref 133–144)
WBC # BLD AUTO: 5.3 10E9/L (ref 4–11)

## 2019-02-14 PROCEDURE — 36415 COLL VENOUS BLD VENIPUNCTURE: CPT

## 2019-02-14 PROCEDURE — G0463 HOSPITAL OUTPT CLINIC VISIT: HCPCS | Mod: ZF

## 2019-02-14 PROCEDURE — 00000402 ZZHCL STATISTIC TOTAL PROTEIN: Performed by: INTERNAL MEDICINE

## 2019-02-14 PROCEDURE — 99214 OFFICE O/P EST MOD 30 MIN: CPT | Mod: ZP | Performed by: INTERNAL MEDICINE

## 2019-02-14 PROCEDURE — 80053 COMPREHEN METABOLIC PANEL: CPT | Performed by: INTERNAL MEDICINE

## 2019-02-14 PROCEDURE — 82784 ASSAY IGA/IGD/IGG/IGM EACH: CPT | Performed by: INTERNAL MEDICINE

## 2019-02-14 PROCEDURE — 84165 PROTEIN E-PHORESIS SERUM: CPT | Performed by: INTERNAL MEDICINE

## 2019-02-14 PROCEDURE — 83883 ASSAY NEPHELOMETRY NOT SPEC: CPT | Performed by: INTERNAL MEDICINE

## 2019-02-14 PROCEDURE — 85025 COMPLETE CBC W/AUTO DIFF WBC: CPT | Performed by: INTERNAL MEDICINE

## 2019-02-14 RX ORDER — LANCETS
EACH MISCELLANEOUS
COMMUNITY
Start: 2017-04-06 | End: 2024-03-21

## 2019-02-14 ASSESSMENT — MIFFLIN-ST. JEOR: SCORE: 1716.18

## 2019-02-14 NOTE — PROGRESS NOTES
HEMATOLOGY CLINIC VISIT    Feb 14, 2019    Reason for Visit: Follow up for solitary plasmacytoma, right humerus    History of Present Illness: Vince Krueger is a 43 year old male with a history of right humeral solitary plasmacytoma. He previously followed with Dr. Panda and now sees Dr. Mac for follow-up. He was diagnosed in May 2017 after presenting with several months of right arm pain. He was found to have a solitary plasmacytoma of the right humerus. He underwent surgery with Dr. Hudson followed by radiation therapy. He had no evidence of myeloma at time of presentation. Plan was to follow his labs every 3 months the first year and then space out to 6 months following that.     He did have an episode of worsening arm pain in August 2018, this was evaluated by Dr. Hudson of orthopedic surgery, and no intervention or  was felt indicated at that time and the pain resolved on it's own.      Interval History:  Vince has been doing well from a hematology oncology standpoint.  He has an occasional twinge of pain in the right shoulder, as well as some chronic pain related to the previous radiation and surgery.  There has been no significant change in that for the last several months.      However he has had a lot of issues related to acid reflux and GI problems.  He previously had a Nissen fundoplication for acid reflux and he feels that that surgery is no longer working.  He has been following with a GI doctor down at [facility], and they started him on a PPI and he will be following up with them next several weeks.  His symptoms range from mild abdominal pain to nausea and vomiting with frequent rumination.  He also has had  diarrhea issues since his surgery from dumping syndrome.      Remainder of a complete ROS is negative.    Current Outpatient Medications   Medication Sig Dispense Refill     blood glucose monitoring ([employer] TEST STRIPS) test strip Dispense meter, test strips, lancets covered  by pt ins. Hypoglycemia.  Accu check Marjorie connect. Testing once daily       Ibuprofen (ADVIL PO) Take 200 mg by mouth every 6 hours as needed for moderate pain       loratadine (CLARITIN) 10 MG tablet Take 10 mg by mouth as needed        Multiple Vitamins-Minerals (MULTIVITAMIN ADULT) CHEW Take by mouth daily       omeprazole (RA OMEPRAZOLE) 20 MG tablet Take 20 mg by mouth       oxyCODONE IR (ROXICODONE) 5 MG tablet Take 1 tablet (5 mg) by mouth every 6 hours as needed for severe pain 15 tablet 0       Physical Examination:  There were no vitals taken for this visit.  Wt Readings from Last 10 Encounters:   08/29/18 83.7 kg (184 lb 8 oz)   08/22/18 81.7 kg (180 lb 3.2 oz)   05/03/18 83.1 kg (183 lb 4.8 oz)   01/31/18 82.1 kg (180 lb 14.4 oz)   12/14/17 82.8 kg (182 lb 9.6 oz)   09/27/17 80.9 kg (178 lb 4.8 oz)   07/27/17 78.9 kg (174 lb)   07/20/17 79.4 kg (175 lb)   07/13/17 78.5 kg (173 lb)   07/06/17 78.9 kg (174 lb)     General: awake, sitting in chair, appears comfortable, in NAD  HEENT: MMM, EOM intact, sclerae clear and anicteric, mild erythema posterior oropharynx  CV: RRR, no murmurs appreciated  Resp: Clear to auscultation bilaterally, breathing comfortably on RA, no wheezes, rhonchi  Abd: Soft, mild tenderness in RUQ on deep palpation, BS+, no masses appreciated  MSK:  Warm, FROM, no joint swelling  Skin: no rash or lesions on limited exam  Neuro: CN2-12 grossly intact, no lateralizing symptoms or focal neurologic deficits  Psych: Mood and affect WNL    Laboratory Data:  CBC RESULTS:   Recent Labs   Lab Test 02/14/19  1454   WBC 5.3   RBC 5.41   HGB 14.7   HCT 47.6   MCV 88   MCH 27.2   MCHC 30.9*   RDW 13.3         SPEP pending    Assessment and Plan:  History of solitary plasmacytoma of the right upper extremity, status post surgery followed by radiation.   Vince is now almost 2 years out from the completion of treatment for solitary plasmacytoma involving the right humerus.  At the time of  presentation, he had no evidence of multiple myeloma.  He has had negative SPEPs, and if today's returns negative as well, will plan for repeat lab testing in 6 months and follow up in 1 year, essentially following him as MGUS.    Patient seen and discussed with attending physician, Dr. Mac.     Alla Wren MD  Heme/Onc PGY6  02/14/2019        HEMATOLOGY STAFF:  Seen with fellow, whose note reflects our joint evaluation, assessment, and plan.    Labs today:  Comprehensive metabolic panel and CBC are normal.  SPEP shows no evidence of monoclonal protein.  Quantitative immunoglobulin levels are normal.  Serum free light chain assay is normal.    Plan as outlined above.      Leonel Mac MD  Associate Professor of Medicine  Division of Hematology, Oncology, and Transplantation  Director, [facility]

## 2019-02-14 NOTE — NURSING NOTE
"Oncology Rooming Note    February 14, 2019 3:05 PM   Vince Krueger is a 43 year old male who presents for:    Chief Complaint   Patient presents with     Blood Draw     Labs drawn via VPT by RN in lab. VS taken. Pt checked in for next appt     RECHECK     onc mYELOMA      Initial Vitals: /76 (BP Location: Left arm, Patient Position: Sitting, Cuff Size: Adult Regular)   Pulse 73   Temp 98.3  F (36.8  C) (Oral)   Resp 16   Ht 1.83 m (6' 0.05\")   Wt 79.2 kg (174 lb 11.2 oz)   SpO2 97%   BMI 23.66 kg/m   Estimated body mass index is 23.66 kg/m  as calculated from the following:    Height as of this encounter: 1.83 m (6' 0.05\").    Weight as of this encounter: 79.2 kg (174 lb 11.2 oz). Body surface area is 2.01 meters squared.  Data Unavailable Comment: Data Unavailable   No LMP for male patient.  Allergies reviewed: Yes  Medications reviewed: Yes    Medications: Medication refills not needed today.  Pharmacy name entered into EPIC: [id number] - [address]    Clinical concerns: NONE      6 minutes for nursing intake (face to face time)     Christine IVY Parekh              "

## 2019-02-14 NOTE — LETTER
2/14/2019     RE: Vince Krueger  [address]  [facility] [address]     Dear Colleague,    Thank you for referring your patient, Vince Krueger, to the [facility]. Please see a copy of my visit note below.    HEMATOLOGY CLINIC VISIT    Feb 14, 2019    Reason for Visit: Follow up for solitary plasmacytoma, right humerus    History of Present Illness: Vince Krueger is a 43 year old male with a history of right humeral solitary plasmacytoma. He previously followed with Dr. Panda and now sees Dr. Mac for follow-up. He was diagnosed in May 2017 after presenting with several months of right arm pain. He was found to have a solitary plasmacytoma of the right humerus. He underwent surgery with Dr. Hudson followed by radiation therapy. He had no evidence of myeloma at time of presentation. Plan was to follow his labs every 3 months the first year and then space out to 6 months following that.     He did have an episode of worsening arm pain in August 2018, this was evaluated by Dr. Hudson of orthopedic surgery, and no intervention or  was felt indicated at that time and the pain resolved on it's own.      Interval History:  Vince has been doing well from a hematology oncology standpoint.  He has an occasional twinge of pain in the right shoulder, as well as some chronic pain related to the previous radiation and surgery.  There has been no significant change in that for the last several months.      However he has had a lot of issues related to acid reflux and GI problems.  He previously had a Nissen fundoplication for acid reflux and he feels that that surgery is no longer working.  He has been following with a GI doctor down at [facility], and they started him on a PPI and he will be following up with them next several weeks.  His symptoms range from mild abdominal pain to nausea and vomiting with frequent rumination.  He also has had  diarrhea issues since his surgery from  dumping syndrome.      Remainder of a complete ROS is negative.    Current Outpatient Medications   Medication Sig Dispense Refill     blood glucose monitoring ([employer] TEST STRIPS) test strip Dispense meter, test strips, lancets covered by pt ins. Hypoglycemia.  Accu check Marjorie connect. Testing once daily       Ibuprofen (ADVIL PO) Take 200 mg by mouth every 6 hours as needed for moderate pain       loratadine (CLARITIN) 10 MG tablet Take 10 mg by mouth as needed        Multiple Vitamins-Minerals (MULTIVITAMIN ADULT) CHEW Take by mouth daily       omeprazole (RA OMEPRAZOLE) 20 MG tablet Take 20 mg by mouth       oxyCODONE IR (ROXICODONE) 5 MG tablet Take 1 tablet (5 mg) by mouth every 6 hours as needed for severe pain 15 tablet 0       Physical Examination:  There were no vitals taken for this visit.  Wt Readings from Last 10 Encounters:   08/29/18 83.7 kg (184 lb 8 oz)   08/22/18 81.7 kg (180 lb 3.2 oz)   05/03/18 83.1 kg (183 lb 4.8 oz)   01/31/18 82.1 kg (180 lb 14.4 oz)   12/14/17 82.8 kg (182 lb 9.6 oz)   09/27/17 80.9 kg (178 lb 4.8 oz)   07/27/17 78.9 kg (174 lb)   07/20/17 79.4 kg (175 lb)   07/13/17 78.5 kg (173 lb)   07/06/17 78.9 kg (174 lb)     General: awake, sitting in chair, appears comfortable, in NAD  HEENT: MMM, EOM intact, sclerae clear and anicteric, mild erythema posterior oropharynx  CV: RRR, no murmurs appreciated  Resp: Clear to auscultation bilaterally, breathing comfortably on RA, no wheezes, rhonchi  Abd: Soft, mild tenderness in RUQ on deep palpation, BS+, no masses appreciated  MSK:  Warm, FROM, no joint swelling  Skin: no rash or lesions on limited exam  Neuro: CN2-12 grossly intact, no lateralizing symptoms or focal neurologic deficits  Psych: Mood and affect WNL    Laboratory Data:  CBC RESULTS:   Recent Labs   Lab Test 02/14/19  1454   WBC 5.3   RBC 5.41   HGB 14.7   HCT 47.6   MCV 88   MCH 27.2   MCHC 30.9*   RDW 13.3         SPEP pending    Assessment and Plan:  History of  solitary plasmacytoma of the right upper extremity, status post surgery followed by radiation.   Vince is now almost 2 years out from the completion of treatment for solitary plasmacytoma involving the right humerus.  At the time of presentation, he had no evidence of multiple myeloma.   He has had negative SPEPs, and if today's returns negative as well, will plan for repeat lab testing in 6 months and follow up in 1 year, essentially following him as MGUS.    Patient seen and discussed with attending physician, Dr. Mac.     Alla Wren MD  Heme/Onc PGY6  02/14/2019      HEMATOLOGY STAFF:  Seen with fellow, whose note reflects our joint evaluation, assessment, and plan.    Labs today:  Comprehensive metabolic panel and CBC are normal.  SPEP shows no evidence of monoclonal protein.  Quantitative immunoglobulin levels are normal.  Serum free light chain assay is normal.    Plan as outlined above.      Leonel Mac MD  Associate Professor of Medicine  Division of Hematology, Oncology, and Transplantation  Director, [facility]

## 2019-02-14 NOTE — NURSING NOTE
Chief Complaint   Patient presents with     Blood Draw     Labs drawn via VPT by RN in lab. VS taken. Pt checked in for next appt     Labs collected from venipuncture by RN. Vitals taken. Checked in for appointment(s).    Harini JEREZ RN PHN BSN  BMT/Oncology Lab

## 2019-02-15 LAB
ALBUMIN SERPL ELPH-MCNC: 4.1 G/DL (ref 3.7–5.1)
ALPHA1 GLOB SERPL ELPH-MCNC: 0.3 G/DL (ref 0.2–0.4)
ALPHA2 GLOB SERPL ELPH-MCNC: 0.7 G/DL (ref 0.5–0.9)
B-GLOBULIN SERPL ELPH-MCNC: 0.9 G/DL (ref 0.6–1)
GAMMA GLOB SERPL ELPH-MCNC: 1.2 G/DL (ref 0.7–1.6)
IGA SERPL-MCNC: 304 MG/DL (ref 70–380)
IGG SERPL-MCNC: 1400 MG/DL (ref 695–1620)
IGM SERPL-MCNC: 99 MG/DL (ref 60–265)
KAPPA LC UR-MCNC: 1.12 MG/DL (ref 0.33–1.94)
KAPPA LC/LAMBDA SER: 0.85 {RATIO} (ref 0.26–1.65)
LAMBDA LC SERPL-MCNC: 1.32 MG/DL (ref 0.57–2.63)
M PROTEIN SERPL ELPH-MCNC: 0 G/DL
PROT PATTERN SERPL ELPH-IMP: NORMAL

## 2019-02-16 DIAGNOSIS — C90.31 SOLITARY PLASMACYTOMA IN REMISSION (H): Primary | ICD-10-CM

## 2019-08-16 DIAGNOSIS — C90.30 PLASMACYTOMA OF BONE (H): ICD-10-CM

## 2019-08-16 DIAGNOSIS — C90.31 SOLITARY PLASMACYTOMA IN REMISSION (H): ICD-10-CM

## 2019-08-16 LAB
ALBUMIN SERPL-MCNC: 4 G/DL (ref 3.4–5)
ALP SERPL-CCNC: 77 U/L (ref 40–150)
ALT SERPL W P-5'-P-CCNC: 24 U/L (ref 0–70)
ANION GAP SERPL CALCULATED.3IONS-SCNC: 5 MMOL/L (ref 3–14)
AST SERPL W P-5'-P-CCNC: 19 U/L (ref 0–45)
BASOPHILS # BLD AUTO: 0 10E9/L (ref 0–0.2)
BASOPHILS NFR BLD AUTO: 0.5 %
BILIRUB SERPL-MCNC: 0.4 MG/DL (ref 0.2–1.3)
BUN SERPL-MCNC: 9 MG/DL (ref 7–30)
CALCIUM SERPL-MCNC: 9.2 MG/DL (ref 8.5–10.1)
CHLORIDE SERPL-SCNC: 108 MMOL/L (ref 94–109)
CO2 SERPL-SCNC: 28 MMOL/L (ref 20–32)
CREAT SERPL-MCNC: 0.8 MG/DL (ref 0.66–1.25)
DIFFERENTIAL METHOD BLD: NORMAL
EOSINOPHIL # BLD AUTO: 0 10E9/L (ref 0–0.7)
EOSINOPHIL NFR BLD AUTO: 0.7 %
ERYTHROCYTE [DISTWIDTH] IN BLOOD BY AUTOMATED COUNT: 14.1 % (ref 10–15)
GFR SERPL CREATININE-BSD FRML MDRD: >90 ML/MIN/{1.73_M2}
GLUCOSE SERPL-MCNC: 90 MG/DL (ref 70–99)
HCT VFR BLD AUTO: 46.9 % (ref 40–53)
HGB BLD-MCNC: 15.2 G/DL (ref 13.3–17.7)
IMM GRANULOCYTES # BLD: 0 10E9/L (ref 0–0.4)
IMM GRANULOCYTES NFR BLD: 0.2 %
LYMPHOCYTES # BLD AUTO: 1.3 10E9/L (ref 0.8–5.3)
LYMPHOCYTES NFR BLD AUTO: 21.6 %
MCH RBC QN AUTO: 27.9 PG (ref 26.5–33)
MCHC RBC AUTO-ENTMCNC: 32.4 G/DL (ref 31.5–36.5)
MCV RBC AUTO: 86 FL (ref 78–100)
MONOCYTES # BLD AUTO: 0.4 10E9/L (ref 0–1.3)
MONOCYTES NFR BLD AUTO: 6.7 %
NEUTROPHILS # BLD AUTO: 4.1 10E9/L (ref 1.6–8.3)
NEUTROPHILS NFR BLD AUTO: 70.3 %
PLATELET # BLD AUTO: 268 10E9/L (ref 150–450)
POTASSIUM SERPL-SCNC: 4.1 MMOL/L (ref 3.4–5.3)
PROT SERPL-MCNC: 7.8 G/DL (ref 6.8–8.8)
RBC # BLD AUTO: 5.45 10E12/L (ref 4.4–5.9)
SODIUM SERPL-SCNC: 141 MMOL/L (ref 133–144)
WBC # BLD AUTO: 5.8 10E9/L (ref 4–11)

## 2019-08-16 PROCEDURE — 85025 COMPLETE CBC W/AUTO DIFF WBC: CPT | Performed by: INTERNAL MEDICINE

## 2019-08-16 PROCEDURE — 83883 ASSAY NEPHELOMETRY NOT SPEC: CPT | Mod: 59 | Performed by: INTERNAL MEDICINE

## 2019-08-16 PROCEDURE — 36415 COLL VENOUS BLD VENIPUNCTURE: CPT | Performed by: INTERNAL MEDICINE

## 2019-08-16 PROCEDURE — 84165 PROTEIN E-PHORESIS SERUM: CPT | Performed by: INTERNAL MEDICINE

## 2019-08-16 PROCEDURE — 82784 ASSAY IGA/IGD/IGG/IGM EACH: CPT | Performed by: INTERNAL MEDICINE

## 2019-08-16 PROCEDURE — 83883 ASSAY NEPHELOMETRY NOT SPEC: CPT | Performed by: INTERNAL MEDICINE

## 2019-08-16 PROCEDURE — 80053 COMPREHEN METABOLIC PANEL: CPT | Performed by: INTERNAL MEDICINE

## 2019-08-16 PROCEDURE — 00000402 ZZHCL STATISTIC TOTAL PROTEIN: Performed by: INTERNAL MEDICINE

## 2019-08-19 LAB
ALBUMIN SERPL ELPH-MCNC: 4.4 G/DL (ref 3.7–5.1)
ALPHA1 GLOB SERPL ELPH-MCNC: 0.3 G/DL (ref 0.2–0.4)
ALPHA2 GLOB SERPL ELPH-MCNC: 0.6 G/DL (ref 0.5–0.9)
B-GLOBULIN SERPL ELPH-MCNC: 0.9 G/DL (ref 0.6–1)
GAMMA GLOB SERPL ELPH-MCNC: 1.3 G/DL (ref 0.7–1.6)
IGA SERPL-MCNC: 284 MG/DL (ref 70–380)
IGG SERPL-MCNC: 1310 MG/DL (ref 695–1620)
IGM SERPL-MCNC: 92 MG/DL (ref 60–265)
KAPPA LC UR-MCNC: 1.74 MG/DL (ref 0.33–1.94)
KAPPA LC/LAMBDA SER: 1.47 {RATIO} (ref 0.26–1.65)
LAMBDA LC SERPL-MCNC: 1.18 MG/DL (ref 0.57–2.63)
M PROTEIN SERPL ELPH-MCNC: 0 G/DL
PROT PATTERN SERPL ELPH-IMP: NORMAL

## 2019-09-16 NOTE — TELEPHONE ENCOUNTER
Bed: 19  Expected date:   Expected time:   Means of arrival:   Comments:  EMS   Faxed 2nd request for records

## 2019-10-01 NOTE — MR AVS SNAPSHOT
After Visit Summary   5/3/2018    Vince Krueger    MRN: [id number]           Patient Information     Date Of Birth          1975        Visit Information        Provider Department      5/3/2018 2:45 PM Leonel Mac MD [facility]        Today's Diagnoses     Plasmacytoma of bone (H)    -  1       Follow-ups after your visit        Your next 10 appointments already scheduled     Oct 04, 2018  2:45 PM CDT   [facility] Draw [facility]  [facility]   [facility] Draw ([facility])    [address] [place] [address]  [address]  [facility] [id number] [phone]            Oct 04, 2018  3:15 PM CDT   (Arrive by 3:00 PM)   Return Visit with Leonel Mac MD   [facility] ([facility])    [address] [place] [address]  [address] [place] [id number] [phone]              Who to contact     If you have questions or need follow up information about today's clinic visit or your schedule please contact [facility] directly at [phone].  Normal or non-critical lab and imaging results will be communicated to you by MyChart, letter or phone within 4 business days after the clinic has received the results. If you do not hear from us within 7 days, please contact the clinic through [employer] or phone. If you have a critical or abnormal lab result, we will notify you by phone as soon as possible.  Submit refill requests through [employer] or call your pharmacy and they will forward the refill request to us. Please allow 3 business days for your refill to be completed.          Additional Information About Your Visit        MyChart Information     Net[employer] gives you secure access to your electronic health record. If you see a primary care provider, you can also send messages to your care team and make appointments. If you have questions, please call your primary care clinic.  If you do not    Patient:   ROSALVA AUSTIN            MRN: [id number]            FIN: [id number]              Age:   33 years     Sex:  MALE     :  [birth year]   Associated Diagnoses:   None   Author:   ABIGAIL DANIELS     Chief Complaint   End-stage renal disease; possible AV access infection     History of Present Illness             The patient presents with Patient is a 33-year-old male with a history of end-stage renal disease and hypertension.  He is normally dialyzed on Monday, Wednesday, Friday at the [place] dialysis unit.  His last dialysis treatment was on 2019.  Patient has had issues with his AV access in the past including difficulty with cannulation as well as possible infection.  He has had progressive swelling noted in the area of the AV  access.  When he presented for dialysis yesterday, he was referred to the emergency room due to concerns over infection of the access.  In the emergency room, he was noted to be hyperkalemic and was given medical therapy for this.  Today, the patient's potassium is 5.3.  He does complain of shortness of breath however..       Review of Systems   Cardiovascular:  Peripheral edema.    Respiratory:  Shortness of breath.    Gastrointestinal:  Abdominal discomfort noted.    Genitourinary:  Negative.      Histories   Past Med History: Past Medical History   Abnormal gait  Anxiety disorder  Bladder incontinence  Chest pain  Chronic depression  Chronic pain  Dependence on hemodialysis  ESRD - End stage renal disease  ESRD on hemodialysis  Epididymo-orchitis  GERD (gastroesophageal reflux disease)  H/O hematuria  HTN (hypertension)  Hx of pyelonephritis  Hx of spina bifida  Hypertension  Obstructive sleep apnea  Snoring  Spina bifida    Family History:    Entire family history is negative., Negative for renal disease   Procedure History:    AVF Placement on 2009 at 23 Years.  Spina Bifida with hydrocephalus  shunt.  Leg Surgery for spina bifida.  nephrectomy.  Hx  "have a primary care provider, please call [phone] and they will assist you.        Care EveryWhere ID     This is your Care EveryWhere ID. This could be used by other organizations to access your [facility] medical records  [id number]        Your Vitals Were     Pulse Temperature Respirations Height Pulse Oximetry BMI (Body Mass Index)    79 98.1  F (36.7  C) 18 1.803 m (5' 11\") 98% 25.57 kg/m2       Blood Pressure from Last 3 Encounters:   05/03/18 121/76   01/31/18 133/81   09/27/17 114/74    Weight from Last 3 Encounters:   05/03/18 83.1 kg (183 lb 4.8 oz)   01/31/18 82.1 kg (180 lb 14.4 oz)   12/14/17 82.8 kg (182 lb 9.6 oz)              We Performed the Following     *CBC with platelets differential     Comprehensive metabolic panel     Kappa and lambda light chain     Protein electrophoresis     Protein Immunofixation Serum     Vitamin D Deficiency        Primary Care Provider Office Phone # Fax #    Tanya Wen, MIGUEL ÁNGEL [phone]       [facility] [address] [place] [facility] [id number]        Equal Access to Services     [facility] : Hadii aad ku hadasho Soomaali, waaxda luqadaha, qaybta kaalmada adeegyada, leonardo garcia. So [facility] [phone].    ATENCIÓN: Si habla español, tiene a javier disposición servicios gratuitos de asistencia lingüística. [facility] [phone].    We comply with applicable federal civil rights laws and [place] laws. We do not discriminate on the basis of race, color, national origin, age, disability, sex, sexual orientation, or gender identity.            Thank you!     Thank you for choosing [facility]  for your care. Our goal is always to provide you with excellent care. Hearing back from our patients is one way we can continue to improve our services. Please take a few minutes to complete the written survey that you may receive in the mail after your visit with us. Thank you!             Your Updated " Peritoneal Dialysis Catheter.   Social History       Alcohol  Details: Use: None.  Details: Use: None.  Substance Abuse  Details: Use: None.  Tobacco  Details: Smoked/Smokeless Tobacco Last 30 Days: No.  Smoking Tobacco Use: Never smoker.  Smokeless Tobacco Use Never.  Details: Used in Last 12 Months: No.  Use: Never smoker.  .       Health Status   Allergies:    Allergic Reactions (All)  Severity Not Documented  Felodipine- Palpitations.  Latex- Sob.  Plendil- \ and unknown.   Current medications:  (Selected)   Inpatient Medications  Ordered  Artificial Tears ophthalmic solution (preserved): 1 drop, Each Eye, TID, Routine, Order Start: 10/01/19 9:00:00 CDT, Ophth Soln  Coreg oral 6.25 tablet: 6.25 mg = 1 tab, Oral, Q12H, Hold for HR less than 60, and/or SBP less than 90, Routine, Order Start: 09/30/19 22:39:00 CDT, Tab  Lipitor oral 10 mg tablet: 10 mg = 1 tab, Oral, Q Bedtime, Routine, Order Start: 09/30/19 22:40:00 CDT, Tab  MiraLax oral powder for solution: 17 gm = 1 packet, Oral, Daily, Routine, Order Start: 10/01/19 9:00:00 CDT, Oral Soln  Hawley oral 325-10 mg tablet: 1 tab, Oral, Q6H, PRN pain moderate, Routine, Order Start: 09/30/19 21:46:00 CDT, Tab  Renvela (carbonate) oral 800 mg tablet: 2,400 mg = 3 tab, Oral, TID [with meals], Routine, Order Start: 10/01/19 8:00:00 CDT, Tab  Sensipar oral 30 mg tablet: 120 mg = 4 tab, Oral, Q Evening, Routine, Order Start: 09/30/19 22:45:00 CDT, Tab  Vancomycin - Pharmacist to Dose: Rx to Dose, IVPB, As Directed PRN, PRN Other (see order comments), Rationale: Therapeutic (documented infection), Indication: Skin/Soft tissue infection, RATE: 0 mL/hr, Infuse over 0 hr, 1 mL TOTAL Volume, Routine, Order Start: 10/01/19 3:51:00 CDT, x...  Vancomycin IV Dosing - Pharmacist to Dose COMMUNICATION.: COMMUNICATION ORDER, Order Start: 10/01/19 9:00:00 CDT  Vitamin D3 oral 1,000 unit tablet: 5,000 unit = 5 tab, Oral, Daily, Routine, Order Start: 10/01/19 9:00:00 CDT, Tab  Zoloft  Medication List - Protect others around you: Learn how to safely use, store and throw away your medicines at www.disposemymeds.org.          This list is accurate as of 5/3/18 11:59 PM.  Always use your most recent med list.                   Brand Name Dispense Instructions for use Diagnosis    ADVIL PO      Take 200 mg by mouth every 6 hours as needed for moderate pain        [employer] TEST STRIPS test strip   Generic drug:  blood glucose monitoring      Dispense meter, test strips, lancets covered by pt ins. Hypoglycemia.  Accu check Marjorie connect. Testing once daily    Plasmacytoma of bone (H)       loratadine 10 MG tablet    CLARITIN     Take 10 mg by mouth as needed    Lesion of right humerus       MULTIVITAMIN ADULT Chew      Take by mouth daily        RA OMEPRAZOLE 20 MG tablet   Generic drug:  omeprazole      Take 20 mg by mouth    Plasmacytoma of bone (H)          oral 50 mg tablet: 200 mg = 2 tab, Oral, Q Bedtime, Routine, Order Start: 09/30/19 22:44:00 CDT, Tab  acetaminophen (Tylenol).: 650 mg = 2 tab, Oral, Q6H, PRN pain mild, Routine, Order Start: 09/30/19 21:45:00 CDT, Tab  calcium acetate oral 667 mg capsule [elemental Ca 169 mg]: 1,334 mg = 2 cap, Oral, BID [with lunch & dinner], Routine, Order Start: 10/01/19 12:00:00 CDT, Cap  cefepime injection (Maxipime): 1,000 mg, IVPB, Q24H, Rationale: Therapeutic (documented infection), Indication: Skin/Soft tissue infection, RATE: 200 mL/hr, Infuse over 30 minutes, mL TOTAL Volume 100, Routine, Order Start: 10/01/19 19:00:00 CDT, x 7 days, Order Stop: 10/07/19 19:0...  docusate sodium oral 100 mg capsule: 100 mg = 1 cap, Oral, BID, PRN constipation, Routine, Order Start: 09/30/19 21:46:00 CDT, Cap  heparin subcutaneous injection 5,000 unit: 5,000 unit = 1 mL, Subcutaneous, Q8H, Routine, Order Start: 09/30/19 22:00:00 CDT, Injection  lanthanum carbonate oral 500 mg chewable tablet: 1,000 mg = 2 tab, Oral, Q4H, PRN abdominal pain, Routine, Order Start: 09/30/19 21:47:00 CDT, Tab Chew  loperamide oral 2 mg capsule (Imodium): 2 mg = 1 cap, Oral, TID, PRN loose stools, Routine, Order Start: 09/30/19 21:47:00 CDT, Cap  meclizine oral 12.5 mg tablet (Antivert): 12.5 mg = 1 tab, Oral, TID, PRN dizziness, Routine, Order Start: 09/30/19 21:47:00 CDT, Tab  multivitamin renal oral tablet (Nephro-Genaro Rx / Vol-Care Rx): 1 tab, Oral, Daily, Routine, Order Start: 10/01/19 9:00:00 CDT, Tab  mupirocin topical 2% ointment (Bactroban): Apply to Other (see order comments), 1 application, Topical, TID, Routine, Order Start: 10/01/19 9:00:00 CDT, Ointment  omeprazole oral (PriLOSEC) [auto-sub to pantoprazole oral]: 40 mg = 1 tab, Oral, Daily, Routine, Order Start: 10/01/19 9:00:00 CDT, Tab DR, Sub for omeprazole 20 mg Daily.  ondansetron (Zofran).: 4 mg = 2 mL, Slow IV Push, Q8H, PRN nausea, Routine, Order Start: 09/30/19 21:45:00 CDT, Injection   traZODone oral 50 mg tablet (Desyrel): 150 mg = 3 tab, Oral, Q Bedtime, Routine, Order Start: 09/30/19 22:45:00 CDT, Tab  zinc sulfate oral 220 mg capsule: 220 mg = 1 cap, Oral, Daily, Routine, Order Start: 10/01/19 9:00:00 CDT, Cap  Documented Medications  Documented  Artificial Tears ophthalmic solution (preserved): = 1 drop, Each Eye, TID, Solution, # 10 mL, Maintenance  Coreg oral 3.125 mg tablet: 3.125 mg = 1 tab, Oral, Q Mon, Wed & Fri, during dialysis, Tab, Maintenance  Coreg oral 6.25 tablet: 6.25 mg = 1 tab, Oral, BID, hold dose before dialysis, Tab, Maintenance  Lipitor oral 10 mg tablet: 10 mg = 1 tab, Oral, Q Bedtime, Tab, Maintenance  MiraLax oral powder for solution: 17 gm = 17 gm, Oral, Daily, dissolve in water before taking, Powder Recon, # 255 gm, Maintenance  Mircera 150 mcg/0.3 mL injectable solution: 150 mcg, IV, Q2 Weeks, with dialysis at [facility], Maintenance  Norco oral 325-10 mg tablet: = 1 tab, Oral, Q6H, PRN for pain, 0 Refills, Maintenance  Maria E-Genaro oral tablet: = 1 tab, Oral, Daily, Tab, # 30 tab, Maintenance  Renvela (carbonate) 2.4 g oral powder for reconstitution: 2.4 gm = 1 each, Oral, TID [with meals], Powder Recon, # 90 each, Maintenance  Sensipar 60 mg oral tablet: 120 mg = 2 tab, Oral, Q Evening, Tab, Maintenance  Vitamin D3 5000 intl units oral capsule: 5,000 unit = 1 cap, Oral, Daily, with food, Cap, # 100 cap, Maintenance  Zoloft: 200 mg, Oral, Q Bedtime, Maintenance  acetaminophen: 325 mg, Oral, Q6H, PRN as needed for pain, Maintenance  calcitriol (vitamin D3) oral 0.25 mcg capsule: 0.25 mcg = 1 cap, Oral, Q Mon, Wed & Fri, with dialysis at [facility], Cap, Maintenance  calcium acetate oral 667 mg tablet [elemental Ca 169 mg]: 1,334 mg = 2 tab, Oral, BID [with lunch & dinner], Tab, Maintenance  docusate sodium 100 mg oral tablet: 100 mg = 1 tab, Oral, BID, PRN for constipation, Tab, # 100 tab, Maintenance  heparin injection 1,000 unit/mL: See Instructions,  6000 unit bolus followed by 1000 unit/hr with dialysis at [facility]  lanthanum carbonate oral 1,000 mg chewable tablet: 1,000 mg = 1 tab, Oral, Q4H, PRN Other (see order comments), mix with applesauce, Maintenance  loperamide oral 2 mg capsule (Imodium): 2 mg = 1 cap, Oral, TID, PRN for loose stool, Cap, Maintenance  meclizine oral 12.5 mg tablet (Antivert): 12.5 mg = 1 tab, Oral, TID, PRN for dizziness, Tab, Maintenance  mupirocin topical 2% ointment (Bactroban): = 1 application, Topical, TID, to right buttock, Ointment, Maintenance  omeprazole oral 20 mg DR capsule: 20 mg = 1 cap, Oral, Daily, Maintenance  traZODone oral 150 mg tablet (Desyrel Dividose): 150 mg = 1 tab, Oral, Q Bedtime, Tab, Maintenance  zinc sulfate oral 220 mg capsule: 220 mg = 1 cap, Oral, Daily, Cap, Maintenance,   Medications (25) Active  Scheduled: (17)  *Antibiotic Dosing Communication  1 each, N/A, Daily  Atorvastatin 10 mg tab  10 mg 1 tab, Oral, Q Bedtime  Calcium acetate 667 mg cap [Ca 169 mg]  1,334 mg 2 cap, Oral, BID [with lunch & dinner]  Carvedilol 6.25 mg tab  6.25 mg 1 tab, Oral, Q12H  cefepime  1,000 mg, IVPB, Q24H  Cholecalciferol 1,000 unit (25 mcg) tab  5,000 unit 5 tab, Oral, Daily  Cinacalcet 30 mg tab  120 mg 4 tab, Oral, Q Evening  Heparin 5,000 unit/1 mL inj  5,000 unit 1 mL, Subcutaneous, Q8H  Hypromellose tears 0.3% (w dextran) ophth soln 15 mL  1 drop, Each Eye, TID  Multivitamin B complex with C-folic acid renal tab  1 tab, Oral, Daily  Mupirocin 2% ointment 22 gm  1 application, Topical, TID  Pantoprazole 40 mg DR tab  40 mg 1 tab, Oral, Daily  Polyethylene glycol 3350 oral recon powder 17 gm packet UD  17 gm 1 packet, Oral, Daily  Sertraline 100 mg tab  200 mg 2 tab, Oral, Q Bedtime  Sevelamer carbonate 800 mg tab  2,400 mg 3 tab, Oral, TID [with meals]  TraZODone 50 mg tab  150 mg 3 tab, Oral, Q Bedtime  Zinc sulfate 220 mg cap [zinc 50 mg]  220 mg 1 cap, Oral, Daily  Continuous: (0)  PRN:  (8)  Acetaminophen 325 mg tab  650 mg 2 tab, Oral, Q6H  Docusate sodium 100 mg cap  100 mg 1 cap, Oral, BID  Hydrocodone-acetaminophen  mg tab  1 tab, Oral, Q6H  Lanthanum carbonate 500 mg chew tab  1,000 mg 2 tab, Oral, Q4H  Loperamide 2 mg cap  2 mg 1 cap, Oral, TID  Meclizine 12.5 mg tab  12.5 mg 1 tab, Oral, TID  Ondansetron 4 mg/2 mL inj SDV  4 mg 2 mL, Slow IV Push, Q8H  vancomycin  Rx to Dose, IVPB, As Directed PRN      Physical Examination   VS/Measurements       Vitals between:   30-SEP-2019 09:44:35   TO   01-OCT-2019 09:44:35                   LAST RESULT MINIMUM MAXIMUM  Temperature 35.4 35.4 37.0  Heart Rate 80 80 89  Respiratory Rate 16 14 18  NISBP           107 107 137  NIDBP           72 66 83  NIMBP           92 80 100  SpO2                    98 97 99  FiO2                    0.21 0.21 0.21  , Measurements from flowsheet : Height and Weight   10/01/19 03:06 CDT CLINICALWEIGHT 153.8 kg    Weight Method Measured    Weight Scale Bed   09/30/19 21:41 CDT CLINICALWEIGHT 152.5 kg    Weight Method Measured    MEDDOSEWT 152.5 kg    CLINICALHEIGHT 165 cm    Height Method Stated    Weight Scale Bed    BSA - Medical History 2.47    BMI-Medical History 56 kg/m2   09/30/19 15:10 CDT CLINICALWEIGHT 154.6 kg    Weight Method Measured    MEDDOSEWT 154.6 kg   09/30/19 14:59 CDT CLINICALWEIGHT 154.1 kg    Weight Method Measured    MEDDOSEWT 154.1 kg     ,   I & O between:  30-SEP-2019 09:44 TO 01-OCT-2019 09:44  Med Dosing Weight:  152.5  kg   30-SEP-2019  24 Hour Intake:   740.00  ( 4.85 mL/kg )  24 Hour Output:   0.00           24 Hour Urine/Stool Output:   0.0  24 Hour Balance:   740.00           24 Hour Urine Output:   0.00  ( 0.00 mL/kg/hr )     General:  No acute distress.    HENT:  Dry oral mucosa.    Respiratory:  Decreased breath sounds at bilateral bases.    Cardiovascular:  Normal rate, Regular rhythm, Trace edema.    Gastrointestinal:  Soft, Mild tenderness noted.    Musculoskeletal:  Left upper  arm AV access noted; areas of denuded skin noted.  Faint bruit audible..   Neurologic:  Alert.    Cognition and Speech:  Oriented.    Psychiatric:  Cooperative.      Review / Management   Laboratory results:       Labs between:  30-SEP-2019 09:44 to 01-OCT-2019 09:44    CBC:                 WBC  HgB  Hct  Plt  MCV  RDW   01-OCT-2019 7.8  (L) 9.3  (L) 30.3  230  88.9  (H) 18.3   30-SEP-2019 8.8  (L) 9.7  (L) 32.0  254  89.6  (H) 18.2     DIFF:                 Seg  Neutroph//ABS  Lymph//ABS  Mono//ABS  EOS/ABS  01-OCT-2019 NOT APPLICABLE  71 // 5.6 19 // 1.5 7 // 0.5 1 // 0.1  30-SEP-2019 NOT APPLICABLE  75 // 6.6 16 // 1.4 7 // 0.6 2 // 0.2    BMP:                 Na  Cl  BUN  Glu   01-OCT-2019 137  99  (H) 105  91                              K  CO2  Cr  Ca                              (H) 5.3  23  (H) 12.20  (L) 8.0   BMP:                 Na  Cl  BUN  Glu   30-SEP-2019 (L) 132  99  (H) 99  87                              K  CO2  Cr  Ca                              (H) 5.9  23  (H) 11.60  (L) 7.9     CMP:                 AST  ALT  AlkPhos  Bili  Albumin   30-SEP-2019 7  21  (H) 291  0.3  (L) 3.0                  .    Radiology results               Result title:  US VASC EXT UPPR VENOUS DPLX LT  Result status:  Final  Verified by:  RAMAKRISHNA THOMPSON on 09/30/2019 7:39  IMPRESSION:1.  Limited exam.  No evidence for acute venous thrombosis detected within the limitations of the exam.**The absence of findings should not deter follow-up or further evaluation of concerning clinical findings.    Result title:  US ABDOMEN LTD  Result status:  Final  Verified by:  RAMAKRISHNA THOMPSON on 09/30/2019 7:44  IMPRESSION:1.  Very limited exam.2.  Contracted gallbladder.  No convincing large shadowing gallstones noted.  Small gallstones cannot be excluded due to the limitations of the exam.3.  Inadequate evaluation of the liver due to very poor acoustic windows.  Further imaging can be obtained as clinically directed.**The absence of  findings should not deter follow-up or further evaluation of concerning clinical findings.          Impression and Plan   Dx and Plan:  Diagnosis     IMPRESSION:  1.  End-stage renal disease.  Patient is normally dialyzed on Monday, Wednesday, Friday.  His last dialysis treatment was on 9/27/2019.  He did miss his usual dialysis treatment yesterday due to concerns over AV access.  He does have excess volume on exam.  Potassium level was elevated yesterday but is improved today.  It is still above target however.  2.  Hyperkalemia.  This is secondary to potassium ingestion and renal failure  Potassium level is improved today with medical therapy.  3.  AV access dysfunction/possible infection.  Work-up for this is ongoing.  4.  Hypertension.  5.  History of spina bifida.    RECOMMENDATIONS:  1.  Continue to monitor chemistries.  2.  Continue empiric antibiotics.  Consider infectious disease consultation.  3.  Surgery to evaluate AV access as well.  4.  Patient is to be dialyzed today.  He may require placement of temporary catheter.  5.  Monitor intake and output..     .

## 2019-10-16 NOTE — DISCHARGE INSTRUCTIONS
Same-Day Surgery   Adult Discharge Orders & Instructions     For 24 hours after surgery:  1. Get plenty of rest.  A responsible adult must stay with you for at least 24 hours after you leave the hospital.   2. Pain medication can slow your reflexes. Do not drive or use heavy equipment.  If you have weakness or tingling, don't drive or use heavy equipment until this feeling goes away.  3. Mixing alcohol and pain medication can cause dizziness and slow your breathing. It can even be fatal. Do not drink alcohol while taking pain medication.  4. Avoid strenuous or risky activities.  Ask for help when climbing stairs.   5. You may feel lightheaded.  If so, sit for a few minutes before standing.  Have someone help you get up.   6. If you have nausea (feel sick to your stomach), drink only clear liquids such as apple juice, ginger ale, broth or 7-Up.  Rest may also help.  Be sure to drink enough fluids.  Move to a regular diet as you feel able. Take pain medications with a small amount of solid food, such as toast or crackers, to avoid nausea.   7. A slight fever is normal. Call the doctor if your fever is over 100 F (37.7 C) (taken under the tongue) or lasts longer than 24 hours.  8. You may have a dry mouth, muscle aches, trouble sleeping or a sore throat. These symptoms should go away after 24 hours.  9. Do not make important or legal decisions.   Pain Management:      1. Take pain medication (if prescribed) for pain as directed by your physician.        2. WARNING: If the pain medication you have been prescribed contains Tylenol (acetaminophen), DO NOT take additional doses of Tylenol (acetaminophen).     Call your doctor for any of the followin.  Signs of infection (fever, growing tenderness at the surgery site, severe pain, a large amount of drainage or bleeding, foul-smelling drainage, redness, swelling).    2.  It has been over 8 to 10 hours since surgery and you are still not able to urinate (pee).    3.   Vaccine Information Statement(s) was given today. This has been reviewed, questions answered, and verbal consent given by Patient for injection(s) and administration of Influenza (Inactivated).    1. Does the patient have a moderate to severe fever?  No  2. Has the patient had a serious reaction to a flu shot before?   No  3. Has the patient ever had [place] Syndrome within 6 weeks of a previous flu shot?  No  4. Is the patient less that 6 months of age?  No    Patient is eligible to receive the vaccine based on all questions being answered as 'No'.    Patient tolerated without incident. See immunization grid for documentation.         Headache for over 24 hours.      To contact a doctor, call Dr. MATTHIEU Hudson's clinic # [phone] or:      [phone] and ask for the Resident On Call for: Orthopaedic Resident (answered 24 hours a day)      Emergency Department:  [place] [place] [facility]: [phone]  [place] [facility]: [phone]               Rev. 10/2014            Tips for taking pain medications  To get the best pain relief possible , remember these points:      Take pain medications as directed, before pain becomes severe      Pain medication can upset your stomach: taking it with food may help      Constipation is a common side effect of pain medication. Drink plenty of  Fluids      Eat foods high in fiber. Take a stool softener  if recommended by your doctor or  Pharmacist.        Do not drink alcohol, drive or operate machinery while taking pain medications.      Ask about other ways to control pain, such as with heat, ice or relaxation.

## 2020-02-06 DIAGNOSIS — C90.30 PLASMACYTOMA OF BONE (H): ICD-10-CM

## 2020-02-06 DIAGNOSIS — C90.31 SOLITARY PLASMACYTOMA IN REMISSION (H): ICD-10-CM

## 2020-02-06 LAB
ALBUMIN SERPL-MCNC: 4.1 G/DL (ref 3.4–5)
ALP SERPL-CCNC: 70 U/L (ref 40–150)
ALT SERPL W P-5'-P-CCNC: 21 U/L (ref 0–70)
ANION GAP SERPL CALCULATED.3IONS-SCNC: 4 MMOL/L (ref 3–14)
AST SERPL W P-5'-P-CCNC: 19 U/L (ref 0–45)
BASOPHILS # BLD AUTO: 0 10E9/L (ref 0–0.2)
BASOPHILS NFR BLD AUTO: 0.4 %
BILIRUB SERPL-MCNC: 0.3 MG/DL (ref 0.2–1.3)
BUN SERPL-MCNC: 12 MG/DL (ref 7–30)
CALCIUM SERPL-MCNC: 10 MG/DL (ref 8.5–10.1)
CHLORIDE SERPL-SCNC: 106 MMOL/L (ref 94–109)
CO2 SERPL-SCNC: 28 MMOL/L (ref 20–32)
CREAT SERPL-MCNC: 0.8 MG/DL (ref 0.66–1.25)
DIFFERENTIAL METHOD BLD: NORMAL
EOSINOPHIL # BLD AUTO: 0.1 10E9/L (ref 0–0.7)
EOSINOPHIL NFR BLD AUTO: 0.9 %
ERYTHROCYTE [DISTWIDTH] IN BLOOD BY AUTOMATED COUNT: 13.2 % (ref 10–15)
GFR SERPL CREATININE-BSD FRML MDRD: >90 ML/MIN/{1.73_M2}
GLUCOSE SERPL-MCNC: 100 MG/DL (ref 70–99)
HCT VFR BLD AUTO: 49.4 % (ref 40–53)
HGB BLD-MCNC: 15.9 G/DL (ref 13.3–17.7)
IMM GRANULOCYTES # BLD: 0 10E9/L (ref 0–0.4)
IMM GRANULOCYTES NFR BLD: 0.3 %
LYMPHOCYTES # BLD AUTO: 1.5 10E9/L (ref 0.8–5.3)
LYMPHOCYTES NFR BLD AUTO: 21.6 %
MCH RBC QN AUTO: 28.4 PG (ref 26.5–33)
MCHC RBC AUTO-ENTMCNC: 32.2 G/DL (ref 31.5–36.5)
MCV RBC AUTO: 88 FL (ref 78–100)
MONOCYTES # BLD AUTO: 0.6 10E9/L (ref 0–1.3)
MONOCYTES NFR BLD AUTO: 8.2 %
NEUTROPHILS # BLD AUTO: 4.8 10E9/L (ref 1.6–8.3)
NEUTROPHILS NFR BLD AUTO: 68.6 %
PLATELET # BLD AUTO: 255 10E9/L (ref 150–450)
POTASSIUM SERPL-SCNC: 4.1 MMOL/L (ref 3.4–5.3)
PROT SERPL-MCNC: 8.3 G/DL (ref 6.8–8.8)
RBC # BLD AUTO: 5.6 10E12/L (ref 4.4–5.9)
SODIUM SERPL-SCNC: 138 MMOL/L (ref 133–144)
WBC # BLD AUTO: 7 10E9/L (ref 4–11)

## 2020-02-06 PROCEDURE — 80053 COMPREHEN METABOLIC PANEL: CPT | Performed by: INTERNAL MEDICINE

## 2020-02-06 PROCEDURE — 84165 PROTEIN E-PHORESIS SERUM: CPT | Performed by: INTERNAL MEDICINE

## 2020-02-06 PROCEDURE — 83883 ASSAY NEPHELOMETRY NOT SPEC: CPT | Mod: 59 | Performed by: INTERNAL MEDICINE

## 2020-02-06 PROCEDURE — 00000402 ZZHCL STATISTIC TOTAL PROTEIN: Performed by: INTERNAL MEDICINE

## 2020-02-06 PROCEDURE — 36415 COLL VENOUS BLD VENIPUNCTURE: CPT | Performed by: INTERNAL MEDICINE

## 2020-02-06 PROCEDURE — 82784 ASSAY IGA/IGD/IGG/IGM EACH: CPT | Mod: 59 | Performed by: INTERNAL MEDICINE

## 2020-02-06 PROCEDURE — 85025 COMPLETE CBC W/AUTO DIFF WBC: CPT | Performed by: INTERNAL MEDICINE

## 2020-02-07 LAB
ALBUMIN SERPL ELPH-MCNC: 4.4 G/DL (ref 3.7–5.1)
ALPHA1 GLOB SERPL ELPH-MCNC: 0.3 G/DL (ref 0.2–0.4)
ALPHA2 GLOB SERPL ELPH-MCNC: 0.7 G/DL (ref 0.5–0.9)
B-GLOBULIN SERPL ELPH-MCNC: 0.9 G/DL (ref 0.6–1)
GAMMA GLOB SERPL ELPH-MCNC: 1.2 G/DL (ref 0.7–1.6)
IGA SERPL-MCNC: 293 MG/DL (ref 84–499)
IGG SERPL-MCNC: 1311 MG/DL (ref 610–1616)
IGM SERPL-MCNC: 107 MG/DL (ref 35–242)
KAPPA LC UR-MCNC: 1.35 MG/DL (ref 0.33–1.94)
KAPPA LC/LAMBDA SER: 1.69 {RATIO} (ref 0.26–1.65)
LAMBDA LC SERPL-MCNC: 0.8 MG/DL (ref 0.57–2.63)
M PROTEIN SERPL ELPH-MCNC: 0 G/DL
PROT PATTERN SERPL ELPH-IMP: NORMAL

## 2020-02-10 NOTE — PROGRESS NOTES
[facility] PHYSICIANS  SPECIALIZING IN BREAKTHROUGHS  Radiation Oncology    On Treatment Visit Note      Vince Krueger      Date: 2017   MRN: [id number]   : 1975  Diagnosis: plasmacytoma of right humerus      Reason for Visit:  On Radiation Treatment Visit     Treatment Summary to Date  Treatment Site: right humerus Current Dose: 720/4500 cGy Fractions:       Chemotherapy  Chemo concurrent with radx?: No (Dr. Panda)    Subjective:   Has more mobility in arm (working on that since surgery).  No problem w/ tx.      Nursing ROS:   Nutrition Alteration  Diet Type: Patient's Preference  Skin  Skin Reaction: 0 - No changes  Skin Intervention: skin changes and skin cares reviewed, Aquaphor samples provided        Cardiovascular  Respiratory effort: 1 - Normal - without distress        Psychosocial  Mood - Anxiety: 0 - Normal  Mood - Depression: 0 - Normal  Pyschosocial Note: energy level at baseline  Pain Assessment  0-10 Pain Scale: 0      Objective:   Wt 78 kg (172 lb)  BMI 23.33 kg/m2   No change.     Labs:  CBC RESULTS:   Recent Labs   Lab Test  17   1602   WBC  6.3   RBC  4.67   HGB  13.7   HCT  41.4   MCV  89   MCH  29.3   MCHC  33.1   RDW  14.1   PLT  178     ELECTROLYTES:  Recent Labs   Lab Test  17   1506   NA  138   POTASSIUM  4.5   CHLORIDE  103   NIKOLAY  9.1   CO2  29   BUN  12   CR  0.88   GLC  130*       Assessment:    Tolerating radiation therapy well.  All questions and concerns addressed.    Plan:   1. Continue current therapy.        Mosaiq chart and setup information reviewed      Medication Review  Med list reviewed with patient?: Yes  Med list printed and given: Offered and declined    Educational Topic Discussed  Education Instructions: Radiation therapy side effects: fatigue, skin changes and skin cares, Aquaphor samples provided      Chel Jasmine MD       Patient unable to complete

## 2020-02-13 ENCOUNTER — ONCOLOGY VISIT (OUTPATIENT)
Dept: ONCOLOGY | Facility: CLINIC | Age: 45
End: 2020-02-13
Attending: INTERNAL MEDICINE
Payer: COMMERCIAL

## 2020-02-13 VITALS
OXYGEN SATURATION: 98 % | WEIGHT: 176.4 LBS | DIASTOLIC BLOOD PRESSURE: 78 MMHG | SYSTOLIC BLOOD PRESSURE: 119 MMHG | BODY MASS INDEX: 23.89 KG/M2 | TEMPERATURE: 98.3 F | HEART RATE: 73 BPM | HEIGHT: 72 IN | RESPIRATION RATE: 15 BRPM

## 2020-02-13 DIAGNOSIS — C90.31 SOLITARY PLASMACYTOMA IN REMISSION (H): Primary | ICD-10-CM

## 2020-02-13 PROCEDURE — G0463 HOSPITAL OUTPT CLINIC VISIT: HCPCS | Mod: ZF

## 2020-02-13 PROCEDURE — 99213 OFFICE O/P EST LOW 20 MIN: CPT | Mod: ZP | Performed by: INTERNAL MEDICINE

## 2020-02-13 ASSESSMENT — MIFFLIN-ST. JEOR: SCORE: 1718.9

## 2020-02-13 ASSESSMENT — PAIN SCALES - GENERAL: PAINLEVEL: NO PAIN (0)

## 2020-02-13 NOTE — PROGRESS NOTES
HEMATOLOGY CLINIC VISIT    Reason for Visit: Follow up for solitary plasmacytoma, right humerus    History of Present Illness: Vince Krueger is a 44 year old male with a history of right humeral solitary plasmacytoma. He was diagnosed in May 2017 after presenting with several months of right arm pain. He was found to have a solitary plasmacytoma of the right humerus. He underwent surgery with Dr. Hudson followed by radiation therapy. He had no evidence of myeloma at time of presentation and had completed all treatment by August 2017.  Monoclonal protein spike was 1.6 at time of diagnosis and decreased to 0 following completion of treatment.    Since I last saw him a year ago, he has remained well.  He has been struggling more, however, with acid reflux symptoms.  He had a Nissen fundoplication procedure about 6 years ago.  He is scheduled for repeat surgical evaluation regarding another procedure to help correct this.  Otherwise his complete review systems is negative.    Exam: He appears healthy.  Detailed exam was not performed today.    Labs:  Metabolic panel is normal.  CBC is normal.  Serum protein electrophoresis continues to demonstrate no evidence of monoclonal protein.     Assessment and Plan:  History of solitary plasmacytoma of the right upper extremity, status post surgery followed by radiation.   Vince is now approximately 2.5 years out from the completion of treatment (surgery followed by radiation) for solitary plasmacytoma involving the right humerus.  At the time of presentation, he had no evidence of multiple myeloma.  His monoclonal protein spike decreased from 1.6 at the time of presentation to 0 following treatment and has remained there ever since.  Our plan is to continue monitoring labs every 6 months, and see him back in clinic in 12 months.    If he needs undergo another surgical procedure to manage his severe gastroesophageal reflux, there is no contraindication from our  perspective.      Total time 15 minutes, all in counseling and coordination of care.          Leonel Mac MD  Associate Professor of Medicine  Division of Hematology, Oncology, and Transplantation  Director, [facility]

## 2020-02-13 NOTE — NURSING NOTE
"Oncology Rooming Note    February 13, 2020 3:21 PM   Vince Krueger is a 44 year old male who presents for:    Chief Complaint   Patient presents with     Oncology Clinic Visit     Return; Myeloma     Initial Vitals: /78   Pulse 73   Temp 98.3  F (36.8  C) (Oral)   Resp 15   Ht 1.83 m (6' 0.05\")   Wt 80 kg (176 lb 6.4 oz)   SpO2 98%   BMI 23.89 kg/m   Estimated body mass index is 23.89 kg/m  as calculated from the following:    Height as of this encounter: 1.83 m (6' 0.05\").    Weight as of this encounter: 80 kg (176 lb 6.4 oz). Body surface area is 2.02 meters squared.  No Pain (0) Comment: Data Unavailable   No LMP for male patient.  Allergies reviewed: Yes  Medications reviewed: Yes    Medications: Medication refills not needed today.  Pharmacy name entered into EPIC: [id number] - [address]    Clinical concerns: No new concerns        Shruthi Biswas CMA              "

## 2020-02-13 NOTE — LETTER
2/13/2020       RE: Vince Krueger  [address] [facility] [address]     Dear Colleague,    Thank you for referring your patient, Vince Krueger, to the [facility]. Please see a copy of my visit note below.      HEMATOLOGY CLINIC VISIT    Reason for Visit: Follow up for solitary plasmacytoma, right humerus    History of Present Illness: Vince Krueger is a 44 year old male with a history of right humeral solitary plasmacytoma. He was diagnosed in May 2017 after presenting with several months of right arm pain. He was found to have a solitary plasmacytoma of the right humerus. He underwent surgery with Dr. Hudson followed by radiation therapy. He had no evidence of myeloma at time of presentation and had completed all treatment by August 2017.  Monoclonal protein spike was 1.6 at time of diagnosis and decreased to 0 following completion of treatment.    Since I last saw him a year ago, he has remained well.  He has been struggling more, however, with acid reflux symptoms.  He had a Nissen fundoplication procedure about 6 years ago.  He is scheduled for repeat surgical evaluation regarding another procedure to help correct this.  Otherwise his complete review systems is negative.    Exam: He appears healthy.  Detailed exam was not performed today.    Labs:  Metabolic panel is normal.  CBC is normal.  Serum protein electrophoresis continues to demonstrate no evidence of monoclonal protein.     Assessment and Plan:  History of solitary plasmacytoma of the right upper extremity, status post surgery followed by radiation.   Vince is now approximately 2.5 years out from the completion of treatment (surgery followed by radiation) for solitary plasmacytoma involving the right humerus.  At the time of presentation, he had no evidence of multiple myeloma.  His monoclonal protein spike decreased from 1.6 at the time of presentation to 0 following treatment and has remained there ever since.  Our plan is  to continue monitoring labs every 6 months, and see him back in clinic in 12 months.    If he needs undergo another surgical procedure to manage his severe gastroesophageal reflux, there is no contraindication from our perspective.      Total time 15 minutes, all in counseling and coordination of care.          Leonel Mac MD  Associate Professor of Medicine  Division of Hematology, Oncology, and Transplantation  Director, [facility]

## 2020-03-10 ENCOUNTER — HEALTH MAINTENANCE LETTER (OUTPATIENT)
Age: 45
End: 2020-03-10

## 2020-08-04 DIAGNOSIS — C90.31 SOLITARY PLASMACYTOMA IN REMISSION (H): ICD-10-CM

## 2020-08-04 LAB
ALBUMIN SERPL-MCNC: 3.9 G/DL (ref 3.4–5)
ALP SERPL-CCNC: 80 U/L (ref 40–150)
ALT SERPL W P-5'-P-CCNC: 73 U/L (ref 0–70)
ANION GAP SERPL CALCULATED.3IONS-SCNC: 2 MMOL/L (ref 3–14)
AST SERPL W P-5'-P-CCNC: 27 U/L (ref 0–45)
BASOPHILS # BLD AUTO: 0 10E9/L (ref 0–0.2)
BASOPHILS NFR BLD AUTO: 0.6 %
BILIRUB SERPL-MCNC: 0.3 MG/DL (ref 0.2–1.3)
BUN SERPL-MCNC: 10 MG/DL (ref 7–30)
CALCIUM SERPL-MCNC: 9.2 MG/DL (ref 8.5–10.1)
CHLORIDE SERPL-SCNC: 105 MMOL/L (ref 94–109)
CO2 SERPL-SCNC: 31 MMOL/L (ref 20–32)
CREAT SERPL-MCNC: 0.95 MG/DL (ref 0.66–1.25)
DIFFERENTIAL METHOD BLD: NORMAL
EOSINOPHIL # BLD AUTO: 0.1 10E9/L (ref 0–0.7)
EOSINOPHIL NFR BLD AUTO: 1.7 %
ERYTHROCYTE [DISTWIDTH] IN BLOOD BY AUTOMATED COUNT: 13.1 % (ref 10–15)
GFR SERPL CREATININE-BSD FRML MDRD: >90 ML/MIN/{1.73_M2}
GLUCOSE SERPL-MCNC: 97 MG/DL (ref 70–99)
HCT VFR BLD AUTO: 45.5 % (ref 40–53)
HGB BLD-MCNC: 14.7 G/DL (ref 13.3–17.7)
IMM GRANULOCYTES # BLD: 0 10E9/L (ref 0–0.4)
IMM GRANULOCYTES NFR BLD: 0.2 %
LYMPHOCYTES # BLD AUTO: 1.7 10E9/L (ref 0.8–5.3)
LYMPHOCYTES NFR BLD AUTO: 31.3 %
MCH RBC QN AUTO: 28 PG (ref 26.5–33)
MCHC RBC AUTO-ENTMCNC: 32.3 G/DL (ref 31.5–36.5)
MCV RBC AUTO: 87 FL (ref 78–100)
MONOCYTES # BLD AUTO: 0.5 10E9/L (ref 0–1.3)
MONOCYTES NFR BLD AUTO: 9 %
NEUTROPHILS # BLD AUTO: 3.1 10E9/L (ref 1.6–8.3)
NEUTROPHILS NFR BLD AUTO: 57.2 %
PLATELET # BLD AUTO: 242 10E9/L (ref 150–450)
POTASSIUM SERPL-SCNC: 4.6 MMOL/L (ref 3.4–5.3)
PROT SERPL-MCNC: 7.7 G/DL (ref 6.8–8.8)
RBC # BLD AUTO: 5.25 10E12/L (ref 4.4–5.9)
SODIUM SERPL-SCNC: 138 MMOL/L (ref 133–144)
WBC # BLD AUTO: 5.3 10E9/L (ref 4–11)

## 2020-08-04 PROCEDURE — 82784 ASSAY IGA/IGD/IGG/IGM EACH: CPT | Performed by: INTERNAL MEDICINE

## 2020-08-04 PROCEDURE — 83883 ASSAY NEPHELOMETRY NOT SPEC: CPT | Performed by: INTERNAL MEDICINE

## 2020-08-04 PROCEDURE — 80053 COMPREHEN METABOLIC PANEL: CPT | Performed by: INTERNAL MEDICINE

## 2020-08-04 PROCEDURE — 85025 COMPLETE CBC W/AUTO DIFF WBC: CPT | Performed by: INTERNAL MEDICINE

## 2020-08-04 PROCEDURE — 36415 COLL VENOUS BLD VENIPUNCTURE: CPT | Performed by: INTERNAL MEDICINE

## 2020-08-04 PROCEDURE — 00000402 ZZHCL STATISTIC TOTAL PROTEIN: Performed by: INTERNAL MEDICINE

## 2020-08-04 PROCEDURE — 84165 PROTEIN E-PHORESIS SERUM: CPT | Performed by: INTERNAL MEDICINE

## 2020-08-05 LAB
ALBUMIN SERPL ELPH-MCNC: 4.4 G/DL (ref 3.7–5.1)
ALPHA1 GLOB SERPL ELPH-MCNC: 0.3 G/DL (ref 0.2–0.4)
ALPHA2 GLOB SERPL ELPH-MCNC: 0.7 G/DL (ref 0.5–0.9)
B-GLOBULIN SERPL ELPH-MCNC: 0.9 G/DL (ref 0.6–1)
GAMMA GLOB SERPL ELPH-MCNC: 1.2 G/DL (ref 0.7–1.6)
IGA SERPL-MCNC: 279 MG/DL (ref 84–499)
IGG SERPL-MCNC: 1357 MG/DL (ref 610–1616)
IGM SERPL-MCNC: 88 MG/DL (ref 35–242)
KAPPA LC UR-MCNC: 1.59 MG/DL (ref 0.33–1.94)
KAPPA LC/LAMBDA SER: 1.59 {RATIO} (ref 0.26–1.65)
LAMBDA LC SERPL-MCNC: 1 MG/DL (ref 0.57–2.63)
M PROTEIN SERPL ELPH-MCNC: 0 G/DL
PROT PATTERN SERPL ELPH-IMP: NORMAL

## 2021-01-15 ENCOUNTER — HEALTH MAINTENANCE LETTER (OUTPATIENT)
Age: 46
End: 2021-01-15

## 2021-01-17 DIAGNOSIS — C90.31 SOLITARY PLASMACYTOMA IN REMISSION (H): Primary | ICD-10-CM

## 2021-01-21 NOTE — PROGRESS NOTES
Patient Education     Confusion  Confusion or delirium is a change in a person’s ability to think clearly. There may be trouble recognizing familiar people and places or knowing what day it is. Memory, judgment, and decision-making may also be affected. In severe cases, the person may have limited or no response to being spoken to. Confusion usually appears over a few days and can vary throughout the day. It can last weeks to months.  Confusion is usually a sign of an underlying problem. It may occur suddenly. Or it may develop gradually over time. Causes of confusion include brain injury, medicines, alcohol, withdrawal from certain medicines or illegal drugs, and infection. Heart attack and stroke may cause it. Confusion can also be a sign of dementia or a mental illness.  Treatment will depend on the cause of the problem. If the issue is a medicine, stopping the medicine may help. Thiamine supplement may help with very little risk of side effects. Haloperidol is useful but people with Parkinson disease should not use it. Benzodiazepines are only used in people undergoing alcohol withdrawal.  Home care  · Be sure someone is with the confused person at all times. He or she should not be left alone or unsupervised.  · Tell the healthcare provider about all medicines that the person takes. These include prescription, over-the-counter, herbs, and supplements.  · Dehydration can increase confusion. Ask the healthcare provider how much fluid the person should be drinking. Offer liquids and ensure that they are taken.  · Keep all medicines in a secure place under the caregiver’s control. To prevent overdose, a confused person should take medicines only under the supervision of a caregiver.  · To help a person with confusion:  ? Establish a daily routine. Change can be a source of stress for someone with confusion. Make and keep a time schedule for common tasks such as bathing, dressing, taking medicines, meals, going  Dr Love ordered OT to see pt to educate pt on ROM and pendulum exercises. OT is unable to get pt on schedule this AM and is unsure if pt can get scheduled this afternoon. MD notified and stated that pt can d/c without being seen by OT and does not need outpatient OT/PT. Pt was given handouts and instructed on how to do exercises. Questions answered.    for walks, shopping, naps and bed time. Make sure that the person has glasses and hearing aids if needed.  ? Don't use physical restraints.  ? Speak slowly and clearly with a gentle tone of voice. Use short simple words and sentences. Ask one question at a time. Don'tt interrupt, criticize or argue. Be calm and supportive. Use friendly facial expressions. Use pointing and touching to help communicate. If there has been loss of long-term memory, don't ask questions about past events. This would only cause frustration for the person.  ? Use lists, signs, family photos, clocks and calendars as memory aids. Label cabinets and drawers. Try to distract, not confront, the person. When he or she becomes frustrated or upset, redirect attention to eating or some other activity of interest.  ? If this proves to be due to a permanent condition, talk to the healthcare provider or a  about getting a Power of  for healthcare and for financial decisions. It is best to do this while the person can still sign legal documents and make his or her own decisions. Otherwise, a court order will be required.  Follow-up care  Follow up with the person's healthcare provider or as advised for further testing or changes in medical care.  When to seek medical advice  Call the healthcare provider for any of the following:  · Frequent falling  · Refusal to eat or drink  · Increased drowsiness  · Nausea or vomiting  · Unexplained fever over 100.4º F (38.0º C) or as directed by the healthcare provider  Call 911  Call emergency services right away if any of the following occur:  · Violent behavior or behavior too hard to manage at home  · New hallucinations or delusions  · complains of severe headache or numbness or weakness of the face, arm, or leg  · Slurred speech or trouble speaking, walking, or seeing  · Fainting spell, dizziness, or seizure  Date Last Reviewed: 3/1/2018  © 7918-5901 [employer]. [address] [place] [address]  Road, [place] [address]. All rights reserved. This information is not intended as a substitute for professional medical care. Always follow your healthcare professional's instructions.           Patient Education     Symptoms With Uncertain Cause (Adult)  You have been examined, and tests may have been done. However, the exact cause of your symptoms is still not certain. Watch for any new symptoms or worsening of your condition. Another exam or more testing at a later time may be needed. Unless told otherwise, you can go back to your normal routine. Continue to take prescribed medicines as directed. Contact your healthcare provider if you have questions or concerns.   Follow-up care  Follow up with your healthcare provider if your symptoms do not begin to improve in the next few days, or as advised by our staff.   When to seek medical advice  Call your healthcare provider if your symptoms get worse or if new symptoms appear.  Date Last Reviewed: 10/1/2017  © 7175-8446 [employer] [address] [place] [address]. All rights reserved. This information is not intended as a substitute for professional medical care. Always follow your healthcare professional's instructions.

## 2021-01-28 DIAGNOSIS — C90.31 SOLITARY PLASMACYTOMA IN REMISSION (H): ICD-10-CM

## 2021-01-28 LAB
ALBUMIN SERPL-MCNC: 4.3 G/DL (ref 3.4–5)
ALP SERPL-CCNC: 60 U/L (ref 40–150)
ALT SERPL W P-5'-P-CCNC: 26 U/L (ref 0–70)
ANION GAP SERPL CALCULATED.3IONS-SCNC: 1 MMOL/L (ref 3–14)
AST SERPL W P-5'-P-CCNC: 16 U/L (ref 0–45)
BASOPHILS # BLD AUTO: 0 10E9/L (ref 0–0.2)
BASOPHILS NFR BLD AUTO: 0.4 %
BILIRUB SERPL-MCNC: 0.4 MG/DL (ref 0.2–1.3)
BUN SERPL-MCNC: 11 MG/DL (ref 7–30)
CALCIUM SERPL-MCNC: 9.2 MG/DL (ref 8.5–10.1)
CHLORIDE SERPL-SCNC: 106 MMOL/L (ref 94–109)
CO2 SERPL-SCNC: 31 MMOL/L (ref 20–32)
CREAT SERPL-MCNC: 1 MG/DL (ref 0.66–1.25)
DIFFERENTIAL METHOD BLD: NORMAL
EOSINOPHIL # BLD AUTO: 0.1 10E9/L (ref 0–0.7)
EOSINOPHIL NFR BLD AUTO: 1 %
ERYTHROCYTE [DISTWIDTH] IN BLOOD BY AUTOMATED COUNT: 14.1 % (ref 10–15)
GFR SERPL CREATININE-BSD FRML MDRD: 89 ML/MIN/{1.73_M2}
GLUCOSE SERPL-MCNC: 107 MG/DL (ref 70–99)
HCT VFR BLD AUTO: 49.2 % (ref 40–53)
HGB BLD-MCNC: 15.7 G/DL (ref 13.3–17.7)
IMM GRANULOCYTES # BLD: 0 10E9/L (ref 0–0.4)
IMM GRANULOCYTES NFR BLD: 0.2 %
LYMPHOCYTES # BLD AUTO: 1.4 10E9/L (ref 0.8–5.3)
LYMPHOCYTES NFR BLD AUTO: 27.8 %
MCH RBC QN AUTO: 28.1 PG (ref 26.5–33)
MCHC RBC AUTO-ENTMCNC: 31.9 G/DL (ref 31.5–36.5)
MCV RBC AUTO: 88 FL (ref 78–100)
MONOCYTES # BLD AUTO: 0.4 10E9/L (ref 0–1.3)
MONOCYTES NFR BLD AUTO: 8.2 %
NEUTROPHILS # BLD AUTO: 3.1 10E9/L (ref 1.6–8.3)
NEUTROPHILS NFR BLD AUTO: 62.4 %
PLATELET # BLD AUTO: 236 10E9/L (ref 150–450)
POTASSIUM SERPL-SCNC: 4.2 MMOL/L (ref 3.4–5.3)
PROT SERPL-MCNC: 7.7 G/DL (ref 6.8–8.8)
RBC # BLD AUTO: 5.59 10E12/L (ref 4.4–5.9)
SODIUM SERPL-SCNC: 138 MMOL/L (ref 133–144)
WBC # BLD AUTO: 5 10E9/L (ref 4–11)

## 2021-01-28 PROCEDURE — 82784 ASSAY IGA/IGD/IGG/IGM EACH: CPT | Mod: 59 | Performed by: INTERNAL MEDICINE

## 2021-01-28 PROCEDURE — 80053 COMPREHEN METABOLIC PANEL: CPT | Performed by: INTERNAL MEDICINE

## 2021-01-28 PROCEDURE — 83883 ASSAY NEPHELOMETRY NOT SPEC: CPT | Mod: 59 | Performed by: INTERNAL MEDICINE

## 2021-01-28 PROCEDURE — 82784 ASSAY IGA/IGD/IGG/IGM EACH: CPT | Performed by: INTERNAL MEDICINE

## 2021-01-28 PROCEDURE — 99N1036 PR STATISTIC TOTAL PROTEIN: Performed by: INTERNAL MEDICINE

## 2021-01-28 PROCEDURE — 84165 PROTEIN E-PHORESIS SERUM: CPT | Performed by: PATHOLOGY

## 2021-01-28 PROCEDURE — 36415 COLL VENOUS BLD VENIPUNCTURE: CPT | Performed by: INTERNAL MEDICINE

## 2021-01-28 PROCEDURE — 85025 COMPLETE CBC W/AUTO DIFF WBC: CPT | Performed by: INTERNAL MEDICINE

## 2021-01-28 PROCEDURE — 83883 ASSAY NEPHELOMETRY NOT SPEC: CPT | Performed by: INTERNAL MEDICINE

## 2021-01-29 LAB
ALBUMIN SERPL ELPH-MCNC: 4.7 G/DL (ref 3.7–5.1)
ALPHA1 GLOB SERPL ELPH-MCNC: 0.3 G/DL (ref 0.2–0.4)
ALPHA2 GLOB SERPL ELPH-MCNC: 0.6 G/DL (ref 0.5–0.9)
B-GLOBULIN SERPL ELPH-MCNC: 0.9 G/DL (ref 0.6–1)
GAMMA GLOB SERPL ELPH-MCNC: 1.2 G/DL (ref 0.7–1.6)
IGA SERPL-MCNC: 251 MG/DL (ref 84–499)
IGG SERPL-MCNC: 1187 MG/DL (ref 610–1616)
IGM SERPL-MCNC: 82 MG/DL (ref 35–242)
KAPPA LC UR-MCNC: 1.33 MG/DL (ref 0.33–1.94)
KAPPA LC/LAMBDA SER: 1.2 {RATIO} (ref 0.26–1.65)
LAMBDA LC SERPL-MCNC: 1.11 MG/DL (ref 0.57–2.63)
M PROTEIN SERPL ELPH-MCNC: 0 G/DL
PROT PATTERN SERPL ELPH-IMP: NORMAL

## 2021-02-04 ENCOUNTER — VIRTUAL VISIT (OUTPATIENT)
Dept: ONCOLOGY | Facility: CLINIC | Age: 46
End: 2021-02-04
Attending: INTERNAL MEDICINE
Payer: COMMERCIAL

## 2021-02-04 DIAGNOSIS — C90.31 SOLITARY PLASMACYTOMA IN REMISSION (H): Primary | ICD-10-CM

## 2021-02-04 PROCEDURE — 999N001193 HC VIDEO/TELEPHONE VISIT; NO CHARGE

## 2021-02-04 PROCEDURE — 99213 OFFICE O/P EST LOW 20 MIN: CPT | Mod: 95 | Performed by: INTERNAL MEDICINE

## 2021-02-04 RX ORDER — ERYTHROMYCIN 250 MG/1
TABLET, COATED ORAL
COMMUNITY
Start: 2021-02-01 | End: 2024-03-21

## 2021-02-04 NOTE — PROGRESS NOTES
Vince is a 45 year old who is being evaluated via a billable video visit.      How would you like to obtain your AVS? MyChart  If the video visit is dropped, the invitation should be resent by: Send to e-mail at: [email or web address]  Will anyone else be joining your video visit? No      I have reviewed and updated the patient's allergies and medication list.    Concerns: No new concerns.   Refills: None needed.       Vitals - Patient Reported  Weight (Patient Reported): 74.8 kg (165 lb)  Height (Patient Reported): 182.9 cm (6')  BMI (Based on Pt Reported Ht/Wt): 22.38  Pain Score: Moderate Pain (5)  Pain Loc: Low Back    Merle Saldivar CMA      Video Start Time: 5:00 pm  Video-Visit Details    Type of service:  Video Visit    Video End Time: 5:20 pm    Originating Location (pt. Location): home    Distant Location (provider location):  office     Platform used for Video Visit: Gabriel

## 2021-02-04 NOTE — PROGRESS NOTES
HEMATOLOGY CLINIC VISIT    NOTE:  Due to the ongoing COVID-19 pandemic, this visit was conducted by video, with the patient's approval.    Vince Krueger is a 45 year old male with a history of right humeral solitary plasmacytoma. He was diagnosed in May 2017 after presenting with several months of right arm pain. He was found to have a solitary plasmacytoma of the right humerus. He underwent surgery with Dr. Hudson followed by radiation therapy. He had no evidence of myeloma at time of presentation and had completed all treatment by August 2017.  Monoclonal protein spike was 1.6 at time of diagnosis and decreased to 0 following completion of treatment.    Since I last saw him a year ago, he has remained well.  He did undergo surgery about 7 months ago down in [place] to correct a hiatal hernia and revise a previous Nissen fundoplication procedure which had been done several years ago.  Otherwise he feels well and review of systems is negative    Exam: He appears healthy.  Detailed exam was not performed today (video visit).    Labs: Liver and renal function are normal.  CBC is normal.  Serum protein electrophoresis continues to demonstrate no evidence of monoclonal protein.  Quantitative immunoglobulin levels are normal.  Kappa lambda ratio is normal.      Assessment and Plan:  History of solitary plasmacytoma of the right upper extremity, status post surgery followed by radiation.   Vince is now approximately more than 3 years out from the completion of treatment (surgery followed by radiation) for solitary plasmacytoma involving the right humerus.  At the time of presentation, he had no evidence of multiple myeloma.  His monoclonal protein spike decreased from 1.6 at the time of presentation to 0 following treatment and has remained there ever since.  Our plan is to continue monitoring labs every 6 months, and see him back in clinic in 12 months.    Total time 20 minutes, all in counseling and coordination of  care.        Leonel Mac MD  Associate Professor of Medicine  Division of Hematology, Oncology, and Transplantation  Director, [facility]

## 2021-02-04 NOTE — LETTER
2/4/2021         RE: Vince Krueger  [address] [facility] [address]        Dear Colleague,    Thank you for referring your patient, Vince Krueger, to the [facility]. Please see a copy of my visit note below.    Vince is a 45 year old who is being evaluated via a billable video visit.      How would you like to obtain your AVS? MyChart  If the video visit is dropped, the invitation should be resent by: Send to e-mail at: [email or web address]  Will anyone else be joining your video visit? No      I have reviewed and updated the patient's allergies and medication list.    Concerns: No new concerns.   Refills: None needed.       Vitals - Patient Reported  Weight (Patient Reported): 74.8 kg (165 lb)  Height (Patient Reported): 182.9 cm (6')  BMI (Based on Pt Reported Ht/Wt): 22.38  Pain Score: Moderate Pain (5)  Pain Loc: Low Back    Merle Saldivar CMA      Video Start Time: 5:00 pm  Video-Visit Details    Type of service:  Video Visit    Video End Time: 5:20 pm    Originating Location (pt. Location): home    Distant Location (provider location):  office     Platform used for Video Visit: [facility]      HEMATOLOGY CLINIC VISIT    NOTE:  Due to the ongoing COVID-19 pandemic, this visit was conducted by video, with the patient's approval.    Vince Krueger is a 45 year old male with a history of right humeral solitary plasmacytoma. He was diagnosed in May 2017 after presenting with several months of right arm pain. He was found to have a solitary plasmacytoma of the right humerus. He underwent surgery with Dr. Hudson followed by radiation therapy. He had no evidence of myeloma at time of presentation and had completed all treatment by August 2017.  Monoclonal protein spike was 1.6 at time of diagnosis and decreased to 0 following completion of treatment.    Since I last saw him a year ago, he has remained well.  He did undergo surgery about 7 months ago down in [place] to correct a  hiatal hernia and revise a previous Nissen fundoplication procedure which had been done several years ago.  Otherwise he feels well and review of systems is negative    Exam: He appears healthy.  Detailed exam was not performed today (video visit).    Labs: Liver and renal function are normal.  CBC is normal.  Serum protein electrophoresis continues to demonstrate no evidence of monoclonal protein.  Quantitative immunoglobulin levels are normal.  Kappa lambda ratio is normal.      Assessment and Plan:  History of solitary plasmacytoma of the right upper extremity, status post surgery followed by radiation.   Vince is now approximately more than 3 years out from the completion of treatment (surgery followed by radiation) for solitary plasmacytoma involving the right humerus.  At the time of presentation, he had no evidence of multiple myeloma.  His monoclonal protein spike decreased from 1.6 at the time of presentation to 0 following treatment and has remained there ever since.  Our plan is to continue monitoring labs every 6 months, and see him back in clinic in 12 months.    Total time 20 minutes, all in counseling and coordination of care.        Leonel Mac MD  Associate Professor of Medicine  Division of Hematology, Oncology, and Transplantation  Director, [facility]

## 2021-03-30 NOTE — TELEPHONE ENCOUNTER
Pt had surgery 5/20/17.  Pt reports 2 days h/o rash on operative arm (hand spared), plus back, chest and behind knees.  Mild itching, no throat/facial swelling.  Patient tried benadryl but still has rash.  Pt has been off oxycodone for a few days, only taking tylenol.  Pt does have h/o allergies to PCN.  Not currently on Abx.  I explained to pt that most common cause in this scenario would be the surgical soap scrub.  Recommend trying to wash the arm (not incision) with wash rag and soapy water.  Also try Claritin daily, for less sedation, can still take Benadryl at night if needed.  This may take several days to resolve.  Can use small amounts of hydrocortisone cream on local spots that are itchy, but not over entire body.  Patient understands.  He will f/u with Dr. Hudson on Tuesday and will give them update on rash.  All questions answered.   The patient is a 89y Female complaining of weakness.

## 2021-04-24 ENCOUNTER — HEALTH MAINTENANCE LETTER (OUTPATIENT)
Age: 46
End: 2021-04-24

## 2021-08-04 ENCOUNTER — LAB (OUTPATIENT)
Dept: LAB | Facility: CLINIC | Age: 46
End: 2021-08-04
Payer: COMMERCIAL

## 2021-08-04 DIAGNOSIS — C90.31 SOLITARY PLASMACYTOMA IN REMISSION (H): ICD-10-CM

## 2021-08-04 LAB
ALBUMIN SERPL-MCNC: 4.3 G/DL (ref 3.4–5)
ALP SERPL-CCNC: 62 U/L (ref 40–150)
ALT SERPL W P-5'-P-CCNC: 29 U/L (ref 0–70)
ANION GAP SERPL CALCULATED.3IONS-SCNC: 5 MMOL/L (ref 3–14)
AST SERPL W P-5'-P-CCNC: 22 U/L (ref 0–45)
BASOPHILS # BLD AUTO: 0 10E3/UL (ref 0–0.2)
BASOPHILS NFR BLD AUTO: 0 %
BILIRUB SERPL-MCNC: 0.5 MG/DL (ref 0.2–1.3)
BUN SERPL-MCNC: 11 MG/DL (ref 7–30)
CALCIUM SERPL-MCNC: 8.9 MG/DL (ref 8.5–10.1)
CHLORIDE BLD-SCNC: 104 MMOL/L (ref 94–109)
CO2 SERPL-SCNC: 28 MMOL/L (ref 20–32)
CREAT SERPL-MCNC: 1.11 MG/DL (ref 0.66–1.25)
EOSINOPHIL # BLD AUTO: 0.1 10E3/UL (ref 0–0.7)
EOSINOPHIL NFR BLD AUTO: 1 %
ERYTHROCYTE [DISTWIDTH] IN BLOOD BY AUTOMATED COUNT: 13.2 % (ref 10–15)
GFR SERPL CREATININE-BSD FRML MDRD: 79 ML/MIN/1.73M2
GLUCOSE BLD-MCNC: 109 MG/DL (ref 70–99)
HCT VFR BLD AUTO: 48.9 % (ref 40–53)
HGB BLD-MCNC: 15.9 G/DL (ref 13.3–17.7)
IMM GRANULOCYTES # BLD: 0 10E3/UL
IMM GRANULOCYTES NFR BLD: 0 %
LYMPHOCYTES # BLD AUTO: 1.7 10E3/UL (ref 0.8–5.3)
LYMPHOCYTES NFR BLD AUTO: 34 %
MCH RBC QN AUTO: 29 PG (ref 26.5–33)
MCHC RBC AUTO-ENTMCNC: 32.5 G/DL (ref 31.5–36.5)
MCV RBC AUTO: 89 FL (ref 78–100)
MONOCYTES # BLD AUTO: 0.5 10E3/UL (ref 0–1.3)
MONOCYTES NFR BLD AUTO: 9 %
NEUTROPHILS # BLD AUTO: 2.8 10E3/UL (ref 1.6–8.3)
NEUTROPHILS NFR BLD AUTO: 56 %
NRBC # BLD AUTO: 0 10E3/UL
NRBC BLD AUTO-RTO: 0 /100
PLATELET # BLD AUTO: 244 10E3/UL (ref 150–450)
POTASSIUM BLD-SCNC: 4.5 MMOL/L (ref 3.4–5.3)
PROT SERPL-MCNC: 8 G/DL (ref 6.8–8.8)
RBC # BLD AUTO: 5.48 10E6/UL (ref 4.4–5.9)
SODIUM SERPL-SCNC: 137 MMOL/L (ref 133–144)
TOTAL PROTEIN SERUM FOR ELP: 7.5 G/DL (ref 6.8–8.8)
WBC # BLD AUTO: 5.1 10E3/UL (ref 4–11)

## 2021-08-04 PROCEDURE — 82784 ASSAY IGA/IGD/IGG/IGM EACH: CPT

## 2021-08-04 PROCEDURE — 80053 COMPREHEN METABOLIC PANEL: CPT

## 2021-08-04 PROCEDURE — 36415 COLL VENOUS BLD VENIPUNCTURE: CPT

## 2021-08-04 PROCEDURE — 85025 COMPLETE CBC W/AUTO DIFF WBC: CPT

## 2021-08-04 PROCEDURE — 83883 ASSAY NEPHELOMETRY NOT SPEC: CPT

## 2021-08-04 PROCEDURE — 84165 PROTEIN E-PHORESIS SERUM: CPT | Performed by: PATHOLOGY

## 2021-08-04 PROCEDURE — 84155 ASSAY OF PROTEIN SERUM: CPT | Mod: 59

## 2021-08-05 LAB
ALBUMIN SERPL ELPH-MCNC: 4.5 G/DL (ref 3.7–5.1)
ALPHA1 GLOB SERPL ELPH-MCNC: 0.3 G/DL (ref 0.2–0.4)
ALPHA2 GLOB SERPL ELPH-MCNC: 0.6 G/DL (ref 0.5–0.9)
B-GLOBULIN SERPL ELPH-MCNC: 0.9 G/DL (ref 0.6–1)
GAMMA GLOB SERPL ELPH-MCNC: 1.3 G/DL (ref 0.7–1.6)
IGA SERPL-MCNC: 288 MG/DL (ref 84–499)
IGG SERPL-MCNC: 1264 MG/DL (ref 610–1616)
IGM SERPL-MCNC: 92 MG/DL (ref 35–242)
KAPPA LC FREE SER-MCNC: 1.33 MG/DL (ref 0.33–1.94)
KAPPA LC FREE/LAMBDA FREE SER NEPH: 1.45 {RATIO} (ref 0.26–1.65)
LAMBDA LC FREE SERPL-MCNC: 0.92 MG/DL (ref 0.57–2.63)
M PROTEIN SERPL ELPH-MCNC: 0 G/DL
PROT PATTERN SERPL ELPH-IMP: NORMAL

## 2021-10-09 ENCOUNTER — HEALTH MAINTENANCE LETTER (OUTPATIENT)
Age: 46
End: 2021-10-09

## 2022-02-03 ENCOUNTER — LAB (OUTPATIENT)
Dept: LAB | Facility: CLINIC | Age: 47
End: 2022-02-03
Payer: COMMERCIAL

## 2022-02-03 DIAGNOSIS — C90.31 SOLITARY PLASMACYTOMA IN REMISSION (H): ICD-10-CM

## 2022-02-03 LAB
ALBUMIN SERPL-MCNC: 4.3 G/DL (ref 3.4–5)
ALP SERPL-CCNC: 78 U/L (ref 40–150)
ALT SERPL W P-5'-P-CCNC: 35 U/L (ref 0–70)
ANION GAP SERPL CALCULATED.3IONS-SCNC: 5 MMOL/L (ref 3–14)
AST SERPL W P-5'-P-CCNC: 18 U/L (ref 0–45)
BASOPHILS # BLD AUTO: 0 10E3/UL (ref 0–0.2)
BASOPHILS NFR BLD AUTO: 0 %
BILIRUB SERPL-MCNC: 0.4 MG/DL (ref 0.2–1.3)
BUN SERPL-MCNC: 14 MG/DL (ref 7–30)
CALCIUM SERPL-MCNC: 9.4 MG/DL (ref 8.5–10.1)
CHLORIDE BLD-SCNC: 100 MMOL/L (ref 94–109)
CO2 SERPL-SCNC: 30 MMOL/L (ref 20–32)
CREAT SERPL-MCNC: 1 MG/DL (ref 0.66–1.25)
EOSINOPHIL # BLD AUTO: 0.2 10E3/UL (ref 0–0.7)
EOSINOPHIL NFR BLD AUTO: 3 %
ERYTHROCYTE [DISTWIDTH] IN BLOOD BY AUTOMATED COUNT: 12.8 % (ref 10–15)
GFR SERPL CREATININE-BSD FRML MDRD: >90 ML/MIN/1.73M2
GLUCOSE BLD-MCNC: 105 MG/DL (ref 70–99)
HCT VFR BLD AUTO: 50.4 % (ref 40–53)
HGB BLD-MCNC: 16.2 G/DL (ref 13.3–17.7)
IMM GRANULOCYTES # BLD: 0 10E3/UL
IMM GRANULOCYTES NFR BLD: 0 %
LYMPHOCYTES # BLD AUTO: 1.8 10E3/UL (ref 0.8–5.3)
LYMPHOCYTES NFR BLD AUTO: 28 %
MCH RBC QN AUTO: 28.5 PG (ref 26.5–33)
MCHC RBC AUTO-ENTMCNC: 32.1 G/DL (ref 31.5–36.5)
MCV RBC AUTO: 89 FL (ref 78–100)
MONOCYTES # BLD AUTO: 0.6 10E3/UL (ref 0–1.3)
MONOCYTES NFR BLD AUTO: 9 %
NEUTROPHILS # BLD AUTO: 3.9 10E3/UL (ref 1.6–8.3)
NEUTROPHILS NFR BLD AUTO: 60 %
NRBC # BLD AUTO: 0 10E3/UL
NRBC BLD AUTO-RTO: 0 /100
PLATELET # BLD AUTO: 277 10E3/UL (ref 150–450)
POTASSIUM BLD-SCNC: 3.8 MMOL/L (ref 3.4–5.3)
PROT SERPL-MCNC: 8.7 G/DL (ref 6.8–8.8)
RBC # BLD AUTO: 5.68 10E6/UL (ref 4.4–5.9)
SODIUM SERPL-SCNC: 135 MMOL/L (ref 133–144)
TOTAL PROTEIN SERUM FOR ELP: 8 G/DL (ref 6.8–8.8)
WBC # BLD AUTO: 6.4 10E3/UL (ref 4–11)

## 2022-02-03 PROCEDURE — 82784 ASSAY IGA/IGD/IGG/IGM EACH: CPT

## 2022-02-03 PROCEDURE — 80053 COMPREHEN METABOLIC PANEL: CPT

## 2022-02-03 PROCEDURE — 83521 IG LIGHT CHAINS FREE EACH: CPT

## 2022-02-03 PROCEDURE — 36415 COLL VENOUS BLD VENIPUNCTURE: CPT

## 2022-02-03 PROCEDURE — 84165 PROTEIN E-PHORESIS SERUM: CPT | Performed by: PATHOLOGY

## 2022-02-03 PROCEDURE — 84155 ASSAY OF PROTEIN SERUM: CPT | Mod: 59

## 2022-02-03 PROCEDURE — 85025 COMPLETE CBC W/AUTO DIFF WBC: CPT

## 2022-02-04 LAB
ALBUMIN SERPL ELPH-MCNC: 4.8 G/DL (ref 3.7–5.1)
ALPHA1 GLOB SERPL ELPH-MCNC: 0.3 G/DL (ref 0.2–0.4)
ALPHA2 GLOB SERPL ELPH-MCNC: 0.7 G/DL (ref 0.5–0.9)
B-GLOBULIN SERPL ELPH-MCNC: 1 G/DL (ref 0.6–1)
GAMMA GLOB SERPL ELPH-MCNC: 1.2 G/DL (ref 0.7–1.6)
IGA SERPL-MCNC: 281 MG/DL (ref 84–499)
IGG SERPL-MCNC: 1277 MG/DL (ref 610–1616)
IGM SERPL-MCNC: 96 MG/DL (ref 35–242)
KAPPA LC FREE SER-MCNC: 1.92 MG/DL (ref 0.33–1.94)
KAPPA LC FREE/LAMBDA FREE SER NEPH: 1.52 {RATIO} (ref 0.26–1.65)
LAMBDA LC FREE SERPL-MCNC: 1.26 MG/DL (ref 0.57–2.63)
M PROTEIN SERPL ELPH-MCNC: 0 G/DL
PROT PATTERN SERPL ELPH-IMP: NORMAL

## 2022-02-10 ENCOUNTER — VIRTUAL VISIT (OUTPATIENT)
Dept: ONCOLOGY | Facility: CLINIC | Age: 47
End: 2022-02-10
Attending: INTERNAL MEDICINE
Payer: COMMERCIAL

## 2022-02-10 DIAGNOSIS — C90.31 SOLITARY PLASMACYTOMA IN REMISSION (H): Primary | ICD-10-CM

## 2022-02-10 PROCEDURE — 99213 OFFICE O/P EST LOW 20 MIN: CPT | Mod: 95 | Performed by: INTERNAL MEDICINE

## 2022-02-10 NOTE — LETTER
2/10/2022     RE: Vince Krugeer  [address] [facility] [address]    Dear Colleague,    Thank you for referring your patient, Vince Krueger, to the [facility]. Please see a copy of my visit note below.    Vince is a 46 year old who is being evaluated via a billable video visit.      If the video visit is dropped, the invitation should be resent by: Text to cell phone: [phone]  Will anyone else be joining your video visit? Yes: Wife, Kemi, will be w/ Pt.. How would they like to receive their invitation? Text to cell phone: n/a        Video-Visit Details    Type of service:  Video Visit    Originating Location (pt. Location): Home    Distant Location (provider location):  Office     Platform used for Video Visit: [facility]      HEMATOLOGY CLINIC VISIT    NOTE:  Due to the ongoing COVID-19 pandemic, this visit was conducted by video, with the patient's approval.    Vince Krueger is a 46 year old male with a history of right humeral solitary plasmacytoma, scheduled today for routine follow-up.    Background history:   He was diagnosed in May 2017 after presenting with several months of right arm pain. He was found to have a solitary plasmacytoma of the right humerus. He underwent surgery followed by radiation therapy. He had no evidence of myeloma at time of presentation and had completed all treatment by August 2017.  Monoclonal protein spike was 1.6 at time of diagnosis and decreased to 0 following completion of treatment.  Free kappa light chains were also elevated at time of diagnosis and returned to normal following completion of therapy.    Interval history:  Since I last saw him a year ago, he has remained well overall.  He had surgery couple of years ago to correct a hiatal hernia and revise a previous Nissen fundoplication procedure.  He has been having some recurrent issues related to that and is being evaluated at [facility].  No other questions or concerns related to  today's visit.    Exam:   He appears healthy.  Detailed exam was not performed today (video visit).    Labs:   Liver and renal function are normal.  CBC is normal.  Serum protein electrophoresis continues to demonstrate no evidence of monoclonal protein.  Quantitative immunoglobulin levels are normal.  Kappa lambda ratio is normal.      Assessment and Plan:  History of solitary plasmacytoma of the right upper extremity  -- diagnosed May 2017  -- s/p surgery followed by radiation (completed Aug 2017)  Vince is now more than 4 years out from the completion of treatment (surgery followed by radiation) for solitary plasmacytoma involving the right humerus.  At the time of presentation, he had no evidence of multiple myeloma.  His monoclonal protein spike decreased from 1.6 at the time of presentation to 0 following treatment and has remained there ever since.  [place] free light chains were also elevated at time of diagnosis and have remained in the normal range since completion of treatment.  Our plan is to continue monitoring labs every 6 months, and see him back in 12 months.  He will call with any new questions or concerns in the meantime.    Total time 20 minutes, including review of medical records and labs, video visit, and documentation.      Leonel Mac MD  Professor of Medicine  Division of Hematology, Oncology, and Transplantation  Director, [facility]

## 2022-02-10 NOTE — PROGRESS NOTES
Vince is a 46 year old who is being evaluated via a billable video visit.      If the video visit is dropped, the invitation should be resent by: Text to cell phone: [phone]  Will anyone else be joining your video visit? Yes: Wife, Kemi, will be w/ Pt.. How would they like to receive their invitation? Text to cell phone: n/a        Video-Visit Details    Type of service:  Video Visit    Originating Location (pt. Location): Home    Distant Location (provider location):  Office     Platform used for Video Visit: Gabriel

## 2022-02-10 NOTE — PROGRESS NOTES
HEMATOLOGY CLINIC VISIT    NOTE:  Due to the ongoing COVID-19 pandemic, this visit was conducted by video, with the patient's approval.    Vince Krueger is a 46 year old male with a history of right humeral solitary plasmacytoma, scheduled today for routine follow-up.    Background history:   He was diagnosed in May 2017 after presenting with several months of right arm pain. He was found to have a solitary plasmacytoma of the right humerus. He underwent surgery followed by radiation therapy. He had no evidence of myeloma at time of presentation and had completed all treatment by August 2017.  Monoclonal protein spike was 1.6 at time of diagnosis and decreased to 0 following completion of treatment.  Free kappa light chains were also elevated at time of diagnosis and returned to normal following completion of therapy.    Interval history:  Since I last saw him a year ago, he has remained well overall.  He had surgery couple of years ago to correct a hiatal hernia and revise a previous Nissen fundoplication procedure.  He has been having some recurrent issues related to that and is being evaluated at [facility].  No other questions or concerns related to today's visit.    Exam:   He appears healthy.  Detailed exam was not performed today (video visit).    Labs:   Liver and renal function are normal.  CBC is normal.  Serum protein electrophoresis continues to demonstrate no evidence of monoclonal protein.  Quantitative immunoglobulin levels are normal.  Kappa lambda ratio is normal.      Assessment and Plan:  History of solitary plasmacytoma of the right upper extremity  -- diagnosed May 2017  -- s/p surgery followed by radiation (completed Aug 2017)  Vince is now more than 4 years out from the completion of treatment (surgery followed by radiation) for solitary plasmacytoma involving the right humerus.  At the time of presentation, he had no evidence of multiple myeloma.  His monoclonal protein spike decreased from 1.6 at  the time of presentation to 0 following treatment and has remained there ever since.  [place] free light chains were also elevated at time of diagnosis and have remained in the normal range since completion of treatment.  Our plan is to continue monitoring labs every 6 months, and see him back in 12 months.  He will call with any new questions or concerns in the meantime.    Total time 20 minutes, including review of medical records and labs, video visit, and documentation.      Leonel Mac MD  Professor of Medicine  Division of Hematology, Oncology, and Transplantation  Director, [facility]

## 2022-05-21 ENCOUNTER — HEALTH MAINTENANCE LETTER (OUTPATIENT)
Age: 47
End: 2022-05-21

## 2022-08-11 ENCOUNTER — LAB (OUTPATIENT)
Dept: LAB | Facility: CLINIC | Age: 47
End: 2022-08-11
Payer: COMMERCIAL

## 2022-08-11 DIAGNOSIS — C90.31 SOLITARY PLASMACYTOMA IN REMISSION (H): ICD-10-CM

## 2022-08-11 LAB
ALBUMIN SERPL-MCNC: 4.1 G/DL (ref 3.4–5)
ALP SERPL-CCNC: 62 U/L (ref 40–150)
ALT SERPL W P-5'-P-CCNC: 29 U/L (ref 0–70)
ANION GAP SERPL CALCULATED.3IONS-SCNC: 3 MMOL/L (ref 3–14)
AST SERPL W P-5'-P-CCNC: 20 U/L (ref 0–45)
BASOPHILS # BLD AUTO: 0 10E3/UL (ref 0–0.2)
BASOPHILS NFR BLD AUTO: 0 %
BILIRUB SERPL-MCNC: 0.5 MG/DL (ref 0.2–1.3)
BUN SERPL-MCNC: 14 MG/DL (ref 7–30)
CALCIUM SERPL-MCNC: 9 MG/DL (ref 8.5–10.1)
CHLORIDE BLD-SCNC: 105 MMOL/L (ref 94–109)
CO2 SERPL-SCNC: 30 MMOL/L (ref 20–32)
CREAT SERPL-MCNC: 0.97 MG/DL (ref 0.66–1.25)
EOSINOPHIL # BLD AUTO: 0.1 10E3/UL (ref 0–0.7)
EOSINOPHIL NFR BLD AUTO: 1 %
ERYTHROCYTE [DISTWIDTH] IN BLOOD BY AUTOMATED COUNT: 13.7 % (ref 10–15)
GFR SERPL CREATININE-BSD FRML MDRD: >90 ML/MIN/1.73M2
GLUCOSE BLD-MCNC: 109 MG/DL (ref 70–99)
HCT VFR BLD AUTO: 49 % (ref 40–53)
HGB BLD-MCNC: 15.7 G/DL (ref 13.3–17.7)
IMM GRANULOCYTES # BLD: 0 10E3/UL
IMM GRANULOCYTES NFR BLD: 0 %
LYMPHOCYTES # BLD AUTO: 1.8 10E3/UL (ref 0.8–5.3)
LYMPHOCYTES NFR BLD AUTO: 33 %
MCH RBC QN AUTO: 29 PG (ref 26.5–33)
MCHC RBC AUTO-ENTMCNC: 32 G/DL (ref 31.5–36.5)
MCV RBC AUTO: 91 FL (ref 78–100)
MONOCYTES # BLD AUTO: 0.5 10E3/UL (ref 0–1.3)
MONOCYTES NFR BLD AUTO: 8 %
NEUTROPHILS # BLD AUTO: 3.2 10E3/UL (ref 1.6–8.3)
NEUTROPHILS NFR BLD AUTO: 58 %
NRBC # BLD AUTO: 0 10E3/UL
NRBC BLD AUTO-RTO: 0 /100
PLATELET # BLD AUTO: 241 10E3/UL (ref 150–450)
POTASSIUM BLD-SCNC: 4 MMOL/L (ref 3.4–5.3)
PROT SERPL-MCNC: 7.7 G/DL (ref 6.8–8.8)
RBC # BLD AUTO: 5.41 10E6/UL (ref 4.4–5.9)
SODIUM SERPL-SCNC: 138 MMOL/L (ref 133–144)
TOTAL PROTEIN SERUM FOR ELP: 7.1 G/DL (ref 6.4–8.3)
WBC # BLD AUTO: 5.5 10E3/UL (ref 4–11)

## 2022-08-11 PROCEDURE — 83521 IG LIGHT CHAINS FREE EACH: CPT

## 2022-08-11 PROCEDURE — 85025 COMPLETE CBC W/AUTO DIFF WBC: CPT

## 2022-08-11 PROCEDURE — 84155 ASSAY OF PROTEIN SERUM: CPT | Mod: 59

## 2022-08-11 PROCEDURE — 84165 PROTEIN E-PHORESIS SERUM: CPT

## 2022-08-11 PROCEDURE — 36415 COLL VENOUS BLD VENIPUNCTURE: CPT

## 2022-08-11 PROCEDURE — 80053 COMPREHEN METABOLIC PANEL: CPT

## 2022-08-11 PROCEDURE — 82784 ASSAY IGA/IGD/IGG/IGM EACH: CPT

## 2022-08-12 LAB
ALBUMIN SERPL ELPH-MCNC: 4.4 G/DL (ref 3.7–5.1)
ALPHA1 GLOB SERPL ELPH-MCNC: 0.3 G/DL (ref 0.2–0.4)
ALPHA2 GLOB SERPL ELPH-MCNC: 0.6 G/DL (ref 0.5–0.9)
B-GLOBULIN SERPL ELPH-MCNC: 0.8 G/DL (ref 0.6–1)
GAMMA GLOB SERPL ELPH-MCNC: 1 G/DL (ref 0.7–1.6)
IGA SERPL-MCNC: 256 MG/DL (ref 84–499)
IGG SERPL-MCNC: 1210 MG/DL (ref 610–1616)
IGM SERPL-MCNC: 84 MG/DL (ref 35–242)
KAPPA LC FREE SER-MCNC: 1.7 MG/DL (ref 0.33–1.94)
KAPPA LC FREE/LAMBDA FREE SER NEPH: 1.5 {RATIO} (ref 0.26–1.65)
LAMBDA LC FREE SERPL-MCNC: 1.13 MG/DL (ref 0.57–2.63)
M PROTEIN SERPL ELPH-MCNC: 0 G/DL
PROT PATTERN SERPL ELPH-IMP: NORMAL

## 2022-09-11 ENCOUNTER — HEALTH MAINTENANCE LETTER (OUTPATIENT)
Age: 47
End: 2022-09-11

## 2022-12-08 ENCOUNTER — LAB (OUTPATIENT)
Dept: LAB | Facility: CLINIC | Age: 47
End: 2022-12-08
Payer: COMMERCIAL

## 2022-12-08 DIAGNOSIS — C90.31 SOLITARY PLASMACYTOMA IN REMISSION (H): ICD-10-CM

## 2022-12-08 LAB
ALBUMIN SERPL-MCNC: 4.2 G/DL (ref 3.4–5)
ALP SERPL-CCNC: 65 U/L (ref 40–150)
ALT SERPL W P-5'-P-CCNC: 36 U/L (ref 0–70)
ANION GAP SERPL CALCULATED.3IONS-SCNC: 4 MMOL/L (ref 3–14)
AST SERPL W P-5'-P-CCNC: 22 U/L (ref 0–45)
BASOPHILS # BLD AUTO: 0 10E3/UL (ref 0–0.2)
BASOPHILS NFR BLD AUTO: 1 %
BILIRUB SERPL-MCNC: 0.5 MG/DL (ref 0.2–1.3)
BUN SERPL-MCNC: 13 MG/DL (ref 7–30)
CALCIUM SERPL-MCNC: 9.5 MG/DL (ref 8.5–10.1)
CHLORIDE BLD-SCNC: 102 MMOL/L (ref 94–109)
CO2 SERPL-SCNC: 30 MMOL/L (ref 20–32)
CREAT SERPL-MCNC: 0.89 MG/DL (ref 0.66–1.25)
EOSINOPHIL # BLD AUTO: 0.1 10E3/UL (ref 0–0.7)
EOSINOPHIL NFR BLD AUTO: 1 %
ERYTHROCYTE [DISTWIDTH] IN BLOOD BY AUTOMATED COUNT: 13.2 % (ref 10–15)
GFR SERPL CREATININE-BSD FRML MDRD: >90 ML/MIN/1.73M2
GLUCOSE BLD-MCNC: 140 MG/DL (ref 70–99)
HCT VFR BLD AUTO: 48.5 % (ref 40–53)
HGB BLD-MCNC: 15.7 G/DL (ref 13.3–17.7)
IMM GRANULOCYTES # BLD: 0 10E3/UL
IMM GRANULOCYTES NFR BLD: 0 %
LYMPHOCYTES # BLD AUTO: 1.3 10E3/UL (ref 0.8–5.3)
LYMPHOCYTES NFR BLD AUTO: 24 %
MCH RBC QN AUTO: 28.7 PG (ref 26.5–33)
MCHC RBC AUTO-ENTMCNC: 32.4 G/DL (ref 31.5–36.5)
MCV RBC AUTO: 89 FL (ref 78–100)
MONOCYTES # BLD AUTO: 0.5 10E3/UL (ref 0–1.3)
MONOCYTES NFR BLD AUTO: 9 %
NEUTROPHILS # BLD AUTO: 3.3 10E3/UL (ref 1.6–8.3)
NEUTROPHILS NFR BLD AUTO: 65 %
NRBC # BLD AUTO: 0 10E3/UL
NRBC BLD AUTO-RTO: 0 /100
PLATELET # BLD AUTO: 244 10E3/UL (ref 150–450)
POTASSIUM BLD-SCNC: 4 MMOL/L (ref 3.4–5.3)
PROT SERPL-MCNC: 7.9 G/DL (ref 6.8–8.8)
RBC # BLD AUTO: 5.47 10E6/UL (ref 4.4–5.9)
SODIUM SERPL-SCNC: 136 MMOL/L (ref 133–144)
TOTAL PROTEIN SERUM FOR ELP: 7.2 G/DL (ref 6.4–8.3)
WBC # BLD AUTO: 5.2 10E3/UL (ref 4–11)

## 2022-12-08 PROCEDURE — 82784 ASSAY IGA/IGD/IGG/IGM EACH: CPT

## 2022-12-08 PROCEDURE — 85025 COMPLETE CBC W/AUTO DIFF WBC: CPT

## 2022-12-08 PROCEDURE — 36415 COLL VENOUS BLD VENIPUNCTURE: CPT

## 2022-12-08 PROCEDURE — 80053 COMPREHEN METABOLIC PANEL: CPT

## 2022-12-08 PROCEDURE — 84155 ASSAY OF PROTEIN SERUM: CPT | Mod: 59

## 2022-12-08 PROCEDURE — 83521 IG LIGHT CHAINS FREE EACH: CPT

## 2022-12-08 PROCEDURE — 84165 PROTEIN E-PHORESIS SERUM: CPT

## 2022-12-09 LAB
ALBUMIN SERPL ELPH-MCNC: 4.6 G/DL (ref 3.7–5.1)
ALPHA1 GLOB SERPL ELPH-MCNC: 0.3 G/DL (ref 0.2–0.4)
ALPHA2 GLOB SERPL ELPH-MCNC: 0.6 G/DL (ref 0.5–0.9)
B-GLOBULIN SERPL ELPH-MCNC: 0.8 G/DL (ref 0.6–1)
GAMMA GLOB SERPL ELPH-MCNC: 1 G/DL (ref 0.7–1.6)
IGA SERPL-MCNC: 234 MG/DL (ref 84–499)
IGG SERPL-MCNC: 1245 MG/DL (ref 610–1616)
IGM SERPL-MCNC: 89 MG/DL (ref 35–242)
KAPPA LC FREE SER-MCNC: 1.7 MG/DL (ref 0.33–1.94)
KAPPA LC FREE/LAMBDA FREE SER NEPH: 1.6 {RATIO} (ref 0.26–1.65)
LAMBDA LC FREE SERPL-MCNC: 1.06 MG/DL (ref 0.57–2.63)
M PROTEIN SERPL ELPH-MCNC: 0 G/DL
PROT PATTERN SERPL ELPH-IMP: NORMAL

## 2022-12-29 ENCOUNTER — VIRTUAL VISIT (OUTPATIENT)
Dept: ONCOLOGY | Facility: CLINIC | Age: 47
End: 2022-12-29
Attending: INTERNAL MEDICINE
Payer: COMMERCIAL

## 2022-12-29 VITALS — WEIGHT: 160 LBS | BODY MASS INDEX: 21.67 KG/M2 | HEIGHT: 72 IN

## 2022-12-29 DIAGNOSIS — C90.31 SOLITARY PLASMACYTOMA IN REMISSION (H): Primary | ICD-10-CM

## 2022-12-29 PROCEDURE — 99213 OFFICE O/P EST LOW 20 MIN: CPT | Mod: GT | Performed by: INTERNAL MEDICINE

## 2022-12-29 PROCEDURE — G0463 HOSPITAL OUTPT CLINIC VISIT: HCPCS | Mod: PN,GT | Performed by: INTERNAL MEDICINE

## 2022-12-29 ASSESSMENT — PAIN SCALES - GENERAL: PAINLEVEL: NO PAIN (0)

## 2022-12-29 NOTE — LETTER
12/29/2022         RE: Vince Krueger  [address] [facility] [place] [address]        Dear Colleague,    Thank you for referring your patient, Vince Krueger, to the [facility]. Please see a copy of my visit note below.    Vince is a 47 year old who is being evaluated via a billable video visit.      How would you like to obtain your AVS? MyChart  If the video visit is dropped, the invitation should be resent by: Send to e-mail at: [email or web address]  Will anyone else be joining your video visit? Yes, wife will join.        Video-Visit Details    Type of service:  Video Visit     Originating Location (pt. Location): Home    Distant Location (provider location):  On-site  Platform used for Video Visit: [facility]      HEMATOLOGY CLINIC VISIT    NOTE:  Due to the ongoing COVID-19 pandemic, this visit was conducted by video, with the patient's approval.    Vince Krueger is a 47 year old male with a history of right humeral solitary plasmacytoma, scheduled today for routine follow-up.    Background history:   He was diagnosed in May 2017 after presenting with several months of right arm pain. He was found to have a solitary plasmacytoma of the right humerus. He underwent surgery followed by radiation therapy. He had no evidence of myeloma at time of presentation and had completed all treatment by August 2017.  Monoclonal protein spike was 1.6 at time of diagnosis and decreased to 0 following completion of treatment.  Free kappa light chains were also elevated at time of diagnosis and returned to normal following completion of therapy.    Interval history:  Since I last saw him a year ago, he has remained well overall.  In October 2022 he had a redo hiatal hernia repair, fundoplication takedown, and redo by Dr. JUVE Thomason in [place].  He is quite happy with the results of the surgery.  He is otherwise feeling well and has no new questions or concerns.      Exam:   He appears healthy.   Detailed exam was not performed today (video visit).    Labs:   Most recent labs are from 12/08/2022.  Liver and renal function are normal.  CBC is normal.  Serum protein electrophoresis continues to demonstrate no evidence of monoclonal protein.  Quantitative immunoglobulin levels are normal.  Kappa lambda ratio is normal.      Assessment and Plan:  History of solitary plasmacytoma of the right upper extremity  -- diagnosed May 2017  -- s/p surgery followed by radiation (completed Aug 2017)    Vince is now more than 5 years out from the completion of treatment (surgery followed by radiation) for solitary plasmacytoma involving the right humerus.  At the time of presentation, he had no evidence of multiple myeloma.  His monoclonal protein spike decreased from 1.6 at the time of presentation to 0 following treatment and has remained there ever since.  [place] free light chains were also elevated at time of diagnosis and have remained in the normal range since completion of treatment.      Our plan is to continue monitoring labs every 6 months, and see him back in 12 months.  He will call with any new questions or concerns in the meantime.    Total time 20 minutes, including review of medical records and labs, video visit, and documentation.      Leonel Mac MD  Professor of Medicine  Division of Hematology, Oncology, and Transplantation  Director, [facility]

## 2022-12-29 NOTE — PROGRESS NOTES
HEMATOLOGY CLINIC VISIT    NOTE:  Due to the ongoing COVID-19 pandemic, this visit was conducted by video, with the patient's approval.    Vince Krueger is a 47 year old male with a history of right humeral solitary plasmacytoma, scheduled today for routine follow-up.    Background history:   He was diagnosed in May 2017 after presenting with several months of right arm pain. He was found to have a solitary plasmacytoma of the right humerus. He underwent surgery followed by radiation therapy. He had no evidence of myeloma at time of presentation and had completed all treatment by August 2017.  Monoclonal protein spike was 1.6 at time of diagnosis and decreased to 0 following completion of treatment.  Free kappa light chains were also elevated at time of diagnosis and returned to normal following completion of therapy.    Interval history:  Since I last saw him a year ago, he has remained well overall.  In October 2022 he had a redo hiatal hernia repair, fundoplication takedown, and redo by Dr. JUVE Thomason in [place].  He is quite happy with the results of the surgery.  He is otherwise feeling well and has no new questions or concerns.      Exam:   He appears healthy.  Detailed exam was not performed today (video visit).    Labs:   Most recent labs are from 12/08/2022.  Liver and renal function are normal.  CBC is normal.  Serum protein electrophoresis continues to demonstrate no evidence of monoclonal protein.  Quantitative immunoglobulin levels are normal.  Kappa lambda ratio is normal.      Assessment and Plan:  History of solitary plasmacytoma of the right upper extremity  -- diagnosed May 2017  -- s/p surgery followed by radiation (completed Aug 2017)    Vince is now more than 5 years out from the completion of treatment (surgery followed by radiation) for solitary plasmacytoma involving the right humerus.  At the time of presentation, he had no evidence of multiple myeloma.  His monoclonal protein spike  decreased from 1.6 at the time of presentation to 0 following treatment and has remained there ever since.  [place] free light chains were also elevated at time of diagnosis and have remained in the normal range since completion of treatment.      Our plan is to continue monitoring labs every 6 months, and see him back in 12 months.  He will call with any new questions or concerns in the meantime.    Total time 20 minutes, including review of medical records and labs, video visit, and documentation.      Leonel Mac MD  Professor of Medicine  Division of Hematology, Oncology, and Transplantation  Director, [facility]

## 2022-12-29 NOTE — PROGRESS NOTES
Vince is a 47 year old who is being evaluated via a billable video visit.      How would you like to obtain your AVS? MyChart  If the video visit is dropped, the invitation should be resent by: Send to e-mail at: [email or web address]  Will anyone else be joining your video visit? Yes, wife will join.        Video-Visit Details    Type of service:  Video Visit     Originating Location (pt. Location): Home    Distant Location (provider location):  On-site  Platform used for Video Visit: Gabriel

## 2023-06-03 ENCOUNTER — HEALTH MAINTENANCE LETTER (OUTPATIENT)
Age: 48
End: 2023-06-03

## 2023-06-20 ENCOUNTER — LAB (OUTPATIENT)
Dept: LAB | Facility: CLINIC | Age: 48
End: 2023-06-20
Payer: COMMERCIAL

## 2023-06-20 DIAGNOSIS — C90.31 SOLITARY PLASMACYTOMA IN REMISSION (H): ICD-10-CM

## 2023-06-20 LAB
ALBUMIN SERPL BCG-MCNC: 4.4 G/DL (ref 3.5–5.2)
ALP SERPL-CCNC: 69 U/L (ref 40–129)
ALT SERPL W P-5'-P-CCNC: 15 U/L (ref 0–70)
ANION GAP SERPL CALCULATED.3IONS-SCNC: 10 MMOL/L (ref 7–15)
AST SERPL W P-5'-P-CCNC: 25 U/L (ref 0–45)
BASOPHILS # BLD AUTO: 0 10E3/UL (ref 0–0.2)
BASOPHILS NFR BLD AUTO: 1 %
BILIRUB SERPL-MCNC: 0.5 MG/DL
BUN SERPL-MCNC: 12.1 MG/DL (ref 6–20)
CALCIUM SERPL-MCNC: 9.4 MG/DL (ref 8.6–10)
CHLORIDE SERPL-SCNC: 106 MMOL/L (ref 98–107)
CREAT SERPL-MCNC: 1.04 MG/DL (ref 0.67–1.17)
DEPRECATED HCO3 PLAS-SCNC: 25 MMOL/L (ref 22–29)
EOSINOPHIL # BLD AUTO: 0 10E3/UL (ref 0–0.7)
EOSINOPHIL NFR BLD AUTO: 1 %
ERYTHROCYTE [DISTWIDTH] IN BLOOD BY AUTOMATED COUNT: 13.4 % (ref 10–15)
GFR SERPL CREATININE-BSD FRML MDRD: 88 ML/MIN/1.73M2
GLUCOSE SERPL-MCNC: 103 MG/DL (ref 70–99)
HCT VFR BLD AUTO: 47.7 % (ref 40–53)
HGB BLD-MCNC: 15.5 G/DL (ref 13.3–17.7)
IMM GRANULOCYTES # BLD: 0 10E3/UL
IMM GRANULOCYTES NFR BLD: 0 %
LYMPHOCYTES # BLD AUTO: 1.5 10E3/UL (ref 0.8–5.3)
LYMPHOCYTES NFR BLD AUTO: 27 %
MCH RBC QN AUTO: 29.1 PG (ref 26.5–33)
MCHC RBC AUTO-ENTMCNC: 32.5 G/DL (ref 31.5–36.5)
MCV RBC AUTO: 90 FL (ref 78–100)
MONOCYTES # BLD AUTO: 0.5 10E3/UL (ref 0–1.3)
MONOCYTES NFR BLD AUTO: 8 %
NEUTROPHILS # BLD AUTO: 3.6 10E3/UL (ref 1.6–8.3)
NEUTROPHILS NFR BLD AUTO: 63 %
NRBC # BLD AUTO: 0 10E3/UL
NRBC BLD AUTO-RTO: 0 /100
PLATELET # BLD AUTO: 233 10E3/UL (ref 150–450)
POTASSIUM SERPL-SCNC: 4.4 MMOL/L (ref 3.4–5.3)
PROT SERPL-MCNC: 7.1 G/DL (ref 6.4–8.3)
RBC # BLD AUTO: 5.32 10E6/UL (ref 4.4–5.9)
SODIUM SERPL-SCNC: 141 MMOL/L (ref 136–145)
TOTAL PROTEIN SERUM FOR ELP: 7 G/DL (ref 6.4–8.3)
WBC # BLD AUTO: 5.6 10E3/UL (ref 4–11)

## 2023-06-20 PROCEDURE — 36415 COLL VENOUS BLD VENIPUNCTURE: CPT

## 2023-06-20 PROCEDURE — 84165 PROTEIN E-PHORESIS SERUM: CPT

## 2023-06-20 PROCEDURE — 84155 ASSAY OF PROTEIN SERUM: CPT | Mod: 59

## 2023-06-20 PROCEDURE — 85025 COMPLETE CBC W/AUTO DIFF WBC: CPT

## 2023-06-20 PROCEDURE — 83521 IG LIGHT CHAINS FREE EACH: CPT

## 2023-06-20 PROCEDURE — 80053 COMPREHEN METABOLIC PANEL: CPT

## 2023-06-21 LAB
ALBUMIN SERPL ELPH-MCNC: 4.4 G/DL (ref 3.7–5.1)
ALPHA1 GLOB SERPL ELPH-MCNC: 0.3 G/DL (ref 0.2–0.4)
ALPHA2 GLOB SERPL ELPH-MCNC: 0.6 G/DL (ref 0.5–0.9)
B-GLOBULIN SERPL ELPH-MCNC: 0.8 G/DL (ref 0.6–1)
GAMMA GLOB SERPL ELPH-MCNC: 1.1 G/DL (ref 0.7–1.6)
KAPPA LC FREE SER-MCNC: 1.79 MG/DL (ref 0.33–1.94)
KAPPA LC FREE/LAMBDA FREE SER NEPH: 1.9 {RATIO} (ref 0.26–1.65)
LAMBDA LC FREE SERPL-MCNC: 0.94 MG/DL (ref 0.57–2.63)
M PROTEIN SERPL ELPH-MCNC: 0 G/DL
PROT PATTERN SERPL ELPH-IMP: NORMAL

## 2023-12-26 ENCOUNTER — LAB (OUTPATIENT)
Dept: LAB | Facility: CLINIC | Age: 48
End: 2023-12-26
Payer: COMMERCIAL

## 2023-12-26 DIAGNOSIS — C90.31 SOLITARY PLASMACYTOMA IN REMISSION (H): ICD-10-CM

## 2023-12-26 LAB
ALBUMIN SERPL BCG-MCNC: 4.6 G/DL (ref 3.5–5.2)
ALP SERPL-CCNC: 61 U/L (ref 40–150)
ALT SERPL W P-5'-P-CCNC: 15 U/L (ref 0–70)
ANION GAP SERPL CALCULATED.3IONS-SCNC: 9 MMOL/L (ref 7–15)
AST SERPL W P-5'-P-CCNC: 22 U/L (ref 0–45)
BASOPHILS # BLD AUTO: 0 10E3/UL (ref 0–0.2)
BASOPHILS NFR BLD AUTO: 1 %
BILIRUB SERPL-MCNC: 0.4 MG/DL
BUN SERPL-MCNC: 13.6 MG/DL (ref 6–20)
CALCIUM SERPL-MCNC: 9.1 MG/DL (ref 8.6–10)
CHLORIDE SERPL-SCNC: 101 MMOL/L (ref 98–107)
CREAT SERPL-MCNC: 0.98 MG/DL (ref 0.67–1.17)
DEPRECATED HCO3 PLAS-SCNC: 30 MMOL/L (ref 22–29)
EGFRCR SERPLBLD CKD-EPI 2021: >90 ML/MIN/1.73M2
EOSINOPHIL # BLD AUTO: 0.1 10E3/UL (ref 0–0.7)
EOSINOPHIL NFR BLD AUTO: 1 %
ERYTHROCYTE [DISTWIDTH] IN BLOOD BY AUTOMATED COUNT: 13.2 % (ref 10–15)
GLUCOSE SERPL-MCNC: 115 MG/DL (ref 70–99)
HCT VFR BLD AUTO: 49.9 % (ref 40–53)
HGB BLD-MCNC: 16.2 G/DL (ref 13.3–17.7)
IMM GRANULOCYTES # BLD: 0 10E3/UL
IMM GRANULOCYTES NFR BLD: 0 %
LYMPHOCYTES # BLD AUTO: 1.4 10E3/UL (ref 0.8–5.3)
LYMPHOCYTES NFR BLD AUTO: 32 %
MCH RBC QN AUTO: 29.4 PG (ref 26.5–33)
MCHC RBC AUTO-ENTMCNC: 32.5 G/DL (ref 31.5–36.5)
MCV RBC AUTO: 91 FL (ref 78–100)
MONOCYTES # BLD AUTO: 0.4 10E3/UL (ref 0–1.3)
MONOCYTES NFR BLD AUTO: 9 %
NEUTROPHILS # BLD AUTO: 2.5 10E3/UL (ref 1.6–8.3)
NEUTROPHILS NFR BLD AUTO: 57 %
NRBC # BLD AUTO: 0 10E3/UL
NRBC BLD AUTO-RTO: 0 /100
PLATELET # BLD AUTO: 227 10E3/UL (ref 150–450)
POTASSIUM SERPL-SCNC: 4.6 MMOL/L (ref 3.4–5.3)
PROT SERPL-MCNC: 7.5 G/DL (ref 6.4–8.3)
RBC # BLD AUTO: 5.51 10E6/UL (ref 4.4–5.9)
SODIUM SERPL-SCNC: 140 MMOL/L (ref 135–145)
TOTAL PROTEIN SERUM FOR ELP: 7.1 G/DL (ref 6.4–8.3)
WBC # BLD AUTO: 4.4 10E3/UL (ref 4–11)

## 2023-12-26 PROCEDURE — 83521 IG LIGHT CHAINS FREE EACH: CPT

## 2023-12-26 PROCEDURE — 84155 ASSAY OF PROTEIN SERUM: CPT | Mod: 59

## 2023-12-26 PROCEDURE — 85025 COMPLETE CBC W/AUTO DIFF WBC: CPT

## 2023-12-26 PROCEDURE — 84165 PROTEIN E-PHORESIS SERUM: CPT | Performed by: PATHOLOGY

## 2023-12-26 PROCEDURE — 36415 COLL VENOUS BLD VENIPUNCTURE: CPT

## 2023-12-26 PROCEDURE — 80053 COMPREHEN METABOLIC PANEL: CPT

## 2023-12-27 LAB
ALBUMIN SERPL ELPH-MCNC: 4.5 G/DL (ref 3.7–5.1)
ALPHA1 GLOB SERPL ELPH-MCNC: 0.2 G/DL (ref 0.2–0.4)
ALPHA2 GLOB SERPL ELPH-MCNC: 0.6 G/DL (ref 0.5–0.9)
B-GLOBULIN SERPL ELPH-MCNC: 0.8 G/DL (ref 0.6–1)
GAMMA GLOB SERPL ELPH-MCNC: 1 G/DL (ref 0.7–1.6)
KAPPA LC FREE SER-MCNC: 1.77 MG/DL (ref 0.33–1.94)
KAPPA LC FREE/LAMBDA FREE SER NEPH: 1.72 {RATIO} (ref 0.26–1.65)
LAMBDA LC FREE SERPL-MCNC: 1.03 MG/DL (ref 0.57–2.63)
M PROTEIN SERPL ELPH-MCNC: 0 G/DL
PROT PATTERN SERPL ELPH-IMP: NORMAL

## 2023-12-28 ENCOUNTER — VIRTUAL VISIT (OUTPATIENT)
Dept: ONCOLOGY | Facility: CLINIC | Age: 48
End: 2023-12-28
Attending: INTERNAL MEDICINE
Payer: COMMERCIAL

## 2023-12-28 VITALS — WEIGHT: 160 LBS | HEIGHT: 72 IN | BODY MASS INDEX: 21.67 KG/M2

## 2023-12-28 DIAGNOSIS — C90.31 SOLITARY PLASMACYTOMA IN REMISSION (H): Primary | ICD-10-CM

## 2023-12-28 PROCEDURE — 99214 OFFICE O/P EST MOD 30 MIN: CPT | Mod: VID | Performed by: INTERNAL MEDICINE

## 2023-12-28 ASSESSMENT — PAIN SCALES - GENERAL: PAINLEVEL: NO PAIN (0)

## 2023-12-28 NOTE — PROGRESS NOTES
Virtual Visit Details    Type of service:  Video Visit     Originating Location (pt. Location): Home    Distant Location (provider location):  On-site  Platform used for Video Visit: Gabriel

## 2023-12-28 NOTE — LETTER
12/28/2023         RE: Vince Krueger  [address] [facility] [address]        Dear Colleague,    Thank you for referring your patient, Vince Krueger, to the [facility]. Please see a copy of my visit note below.      HEMATOLOGY CLINIC VISIT    NOTE:  This visit was conducted by video, with the patient's approval.  Patient location: Home  Provider location: Onsite    Vince Krueger is a 48-year-old male with a history of right humeral solitary plasmacytoma, scheduled today for routine follow-up.    Background history:   He was diagnosed in May 2017 after presenting with several months of right arm pain. He was found to have a solitary plasmacytoma of the right humerus. He underwent surgery followed by radiation therapy. He had no evidence of myeloma at time of presentation and had completed all treatment by August 2017.  Monoclonal protein spike was 1.6 at time of diagnosis and decreased to 0 following completion of treatment.  Free kappa light chains were also elevated at time of diagnosis and returned to normal following completion of therapy.    Interval history:  Since his last visit a year ago, he has remained well.  He recently had a biopsy of a lesion on his finger, and the pathology report indicates this was a squamous cell carcinoma.  He is waiting for further follow-up from the dermatologist.       Exam:   He appears healthy.  Detailed exam was not performed today (video visit).    Labs (12/26/2023):   Liver and renal function are normal.  CBC is normal.      Serum protein electrophoresis continues to demonstrate no evidence of monoclonal protein.      Total kappa and lambda free light chains are within normal range, but the kappa lambda ratio is slightly abnormal.  He had similar findings in June 2023 and also in February 2020 with normal ratios in the interim.      Assessment and Plan:  History of solitary plasmacytoma of the right upper extremity  -- diagnosed  May 2017  -- s/p surgery followed by radiation (completed Aug 2017)    Vince is now more than 6 years out from the completion of treatment (surgery followed by radiation) for solitary plasmacytoma involving the right humerus.  At the time of presentation, he had no evidence of multiple myeloma.  His monoclonal protein spike decreased from 1.6 at the time of presentation to 0 following treatment and has remained there ever since.  [place] free light chains were also elevated at time of diagnosis and have remained in the normal range since completion of treatment.      Our plan is to continue monitoring labs every 6 months, and see him back in 12 months.  He will call with any new questions or concerns in the meantime.    Total time on date of encounter 30 minutes, including review of medical records and labs, video visit, and documentation.        Leonel Mac MD  Professor of Medicine  Division of Hematology, Oncology, and Transplantation  Director, [facility]        Virtual Visit Details    Type of service:  Video Visit     Originating Location (pt. Location): Home    Distant Location (provider location):  On-site  Platform used for Video Visit: [employer]

## 2023-12-28 NOTE — PROGRESS NOTES
HEMATOLOGY CLINIC VISIT    NOTE:  This visit was conducted by video, with the patient's approval.  Patient location: Home  Provider location: Onsite    Vince Krueger is a 48-year-old male with a history of right humeral solitary plasmacytoma, scheduled today for routine follow-up.    Background history:   He was diagnosed in May 2017 after presenting with several months of right arm pain. He was found to have a solitary plasmacytoma of the right humerus. He underwent surgery followed by radiation therapy. He had no evidence of myeloma at time of presentation and had completed all treatment by August 2017.  Monoclonal protein spike was 1.6 at time of diagnosis and decreased to 0 following completion of treatment.  Free kappa light chains were also elevated at time of diagnosis and returned to normal following completion of therapy.    Interval history:  Since his last visit a year ago, he has remained well.  He recently had a biopsy of a lesion on his finger, and the pathology report indicates this was a squamous cell carcinoma.  He is waiting for further follow-up from the dermatologist.       Exam:   He appears healthy.  Detailed exam was not performed today (video visit).    Labs (12/26/2023):   Liver and renal function are normal.  CBC is normal.      Serum protein electrophoresis continues to demonstrate no evidence of monoclonal protein.      Total kappa and lambda free light chains are within normal range, but the kappa lambda ratio is slightly abnormal.  He had similar findings in June 2023 and also in February 2020 with normal ratios in the interim.      Assessment and Plan:  History of solitary plasmacytoma of the right upper extremity  -- diagnosed May 2017  -- s/p surgery followed by radiation (completed Aug 2017)    Vince is now more than 6 years out from the completion of treatment (surgery followed by radiation) for solitary plasmacytoma involving the right humerus.  At the time of presentation, he  had no evidence of multiple myeloma.  His monoclonal protein spike decreased from 1.6 at the time of presentation to 0 following treatment and has remained there ever since.  [place] free light chains were also elevated at time of diagnosis and have remained in the normal range since completion of treatment.      Our plan is to continue monitoring labs every 6 months, and see him back in 12 months.  He will call with any new questions or concerns in the meantime.    Total time on date of encounter 30 minutes, including review of medical records and labs, video visit, and documentation.        Leonel Mac MD  Professor of Medicine  Division of Hematology, Oncology, and Transplantation  Director, [facility]

## 2023-12-28 NOTE — NURSING NOTE
Is the patient currently in the state of [place]? YES    Visit mode:VIDEO    If the visit is dropped, the patient can be reconnected by: VIDEO VISIT: Send to e-mail at: [email or web address]    Will anyone else be joining the visit? NO  (If patient encounters technical issues they should call [phone])    How would you like to obtain your AVS? MyChart    Are changes needed to the allergy or medication list? No    Reason for visit: RECHECK    Wm GOMEZ

## 2024-02-07 ENCOUNTER — TRANSCRIBE ORDERS (OUTPATIENT)
Dept: ONCOLOGY | Facility: CLINIC | Age: 49
End: 2024-02-07
Payer: COMMERCIAL

## 2024-02-07 DIAGNOSIS — R07.81 RIB PAIN: Primary | ICD-10-CM

## 2024-02-08 ENCOUNTER — PATIENT OUTREACH (OUTPATIENT)
Dept: ONCOLOGY | Facility: CLINIC | Age: 49
End: 2024-02-08
Payer: COMMERCIAL

## 2024-02-08 ENCOUNTER — TRANSCRIBE ORDERS (OUTPATIENT)
Dept: OTHER | Age: 49
End: 2024-02-08

## 2024-02-08 DIAGNOSIS — R07.81 RIB PAIN: Primary | ICD-10-CM

## 2024-02-08 NOTE — PROGRESS NOTES
New Patient Oncology Nurse Navigator Note     Referring provider: Dr. Elizabet Santoro   Phone: [phone]   Fax: [phone]   Referring Clinic/Organization: [facility]  Referred to: Thoracic Surgery  Requested provider (if applicable): First available - did not specify   Referral Received: 02/08/24       Evaluation for :   Diagnosis   R07.81 (ICD-10-CM) - Rib pain     Clinical History (per Nurse review of records provided):      02/24/2014 Upper GI Endoscopy (scanned media) noted small hiatal hernia    03/19/2014 Procedure - Laparoscopic Nissen Fundoplication with robotic assistance (bookmarked)    10/05/2022 Procedure - NM LAPAROSCOPY FUNDOPLASTY   NM EGD DX   NM EGD W DIR PLC G TUBE   FUNDOPLICATION, NISSEN, LAPAROSCOPIC   EGD (ESOPHAGOGASTRODUODENOSCOPY)   EGD, WITH PEG TUBE INSERTION (bookmarked)    01/19/2024 XR RIbs Left and PA Chest (Care everywhere) showed:   Impression    The visualized heart and lungs are negative. No rib fractures.      01/29/2024 Most recent OV notes from referring provider (Care Everywhere) showed:   History of present illness:    3 surgeries-nissen fundoplication was around 2014, then revision nissen fundoplication around 2020 and that failed and another nissen fundoplication revision around 2022. Did posterior thoracotomy on the left side for 2nd surgery and they  the ribs and had pain afterward and met with physical therapy and saw chiro also.     Clinical Assessment / Barriers to Care (Per Nurse):      Tobacco Use Types Packs/Day Years Used Date   Smoking Tobacco: Former Cigarettes 0.3 29.1 1995 - 1993   Former Tobacco chew - 15 years ago    Records Location: [facility] Everywhere     Records Needed: Outside images from [facility]  Additional testing needed prior to consult: TBD  Referral updates and Plan:     Per Molly Peck, CNS, she recommends a CT Chest prior to consult. Writer called Vince to discuss the referral. There was no answer. Unable to leave a voicemail. Will  have NPS reach out to pt as well.    ADILENE BurrellN, RN, OCN  [facility] Oncology Nurse Navigator  [phone] / [phone]

## 2024-02-09 ENCOUNTER — PRE VISIT (OUTPATIENT)
Dept: ONCOLOGY | Facility: CLINIC | Age: 49
End: 2024-02-09
Payer: COMMERCIAL

## 2024-02-09 NOTE — TELEPHONE ENCOUNTER
RECORDS STATUS - ALL OTHER DIAGNOSIS      RECORDS RECEIVED FROM: [facility]/ [facility]/ [facility]      Appt Date: TBD NN WQ     Rib Pain   CXR images from [facility] from the last 5 years.      Action    Action Taken 2/9/2024 9:15AM KEJAMES     I called [facility] IMG Dept Ph: [phone]- they will push Chest X-ray and esophagram images     I called [facility] Ph: [phone]- gastric emptying images will be pushed to  PACS.     I called [facility] - need all CXR images from the past five years. I was transferred around a few times. Ph: [phone]- the transferred didn't work. I called back- the imaging dept phone went to . (Brittanie is the representative). I left a detailed message requesting a call back. I provided the [facility] phone number [phone] and fax [phone] Attn: Merle     I resolved scans in PACS      NOTES STATUS DETAILS   OFFICE NOTE from referring provider Elizabet Burkett DO      OFFICE NOTE from medical oncologist     DISCHARGE SUMMARY from hospital     DISCHARGE REPORT from the ER     OPERATIVE REPORT     MEDICATION LIST     CLINICAL TRIAL TREATMENTS TO DATE     LABS     PATHOLOGY REPORTS     ANYTHING RELATED TO DIAGNOSIS     GENONOMIC TESTING     TYPE:     IMAGING (NEED IMAGES & REPORT)     CT SCANS     MRI     MAMMO     ULTRASOUND     PET

## 2024-02-26 NOTE — TELEPHONE ENCOUNTER
RECORDS STATUS - ALL OTHER DIAGNOSIS      RECORDS RECEIVED FROM: [facility], [facility] (imaging previously resolved)   DATE RECEIVED: 2/26

## 2024-03-07 ENCOUNTER — ANCILLARY PROCEDURE (OUTPATIENT)
Dept: CT IMAGING | Facility: CLINIC | Age: 49
End: 2024-03-07
Attending: CLINICAL NURSE SPECIALIST
Payer: COMMERCIAL

## 2024-03-07 ENCOUNTER — PRE VISIT (OUTPATIENT)
Dept: SURGERY | Facility: CLINIC | Age: 49
End: 2024-03-07

## 2024-03-07 DIAGNOSIS — R07.81 RIB PAIN: ICD-10-CM

## 2024-03-07 PROCEDURE — 71250 CT THORAX DX C-: CPT | Mod: GC | Performed by: RADIOLOGY

## 2024-03-21 ENCOUNTER — ONCOLOGY VISIT (OUTPATIENT)
Dept: SURGERY | Facility: CLINIC | Age: 49
End: 2024-03-21
Attending: STUDENT IN AN ORGANIZED HEALTH CARE EDUCATION/TRAINING PROGRAM
Payer: COMMERCIAL

## 2024-03-21 VITALS
OXYGEN SATURATION: 97 % | SYSTOLIC BLOOD PRESSURE: 124 MMHG | BODY MASS INDEX: 22.08 KG/M2 | RESPIRATION RATE: 16 BRPM | DIASTOLIC BLOOD PRESSURE: 80 MMHG | WEIGHT: 163 LBS | TEMPERATURE: 98.1 F | HEIGHT: 72 IN | HEART RATE: 80 BPM

## 2024-03-21 DIAGNOSIS — J98.6 DIAPHRAGM PARALYSIS: Primary | ICD-10-CM

## 2024-03-21 DIAGNOSIS — Z98.890 HISTORY OF FUNDOPLICATION: Primary | ICD-10-CM

## 2024-03-21 PROCEDURE — 99213 OFFICE O/P EST LOW 20 MIN: CPT | Performed by: STUDENT IN AN ORGANIZED HEALTH CARE EDUCATION/TRAINING PROGRAM

## 2024-03-21 PROCEDURE — 99204 OFFICE O/P NEW MOD 45 MIN: CPT | Performed by: STUDENT IN AN ORGANIZED HEALTH CARE EDUCATION/TRAINING PROGRAM

## 2024-03-21 RX ORDER — RIBOFLAVIN (VITAMIN B2) 100 MG
TABLET ORAL
COMMUNITY

## 2024-03-21 RX ORDER — ESCITALOPRAM OXALATE 5 MG/1
5 TABLET ORAL DAILY
COMMUNITY

## 2024-03-21 ASSESSMENT — PAIN SCALES - GENERAL: PAINLEVEL: MILD PAIN (3)

## 2024-03-21 NOTE — LETTER
3/21/2024         RE: Vince Krueger  [address] [facility] [address]        Dear Colleague,    Thank you for referring your patient, Vince Krueger, to the [facility]. Please see a copy of my visit note below.    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     HPI  Vince Krueger is a 48 year old male with a histpry of multiple surgeries : nissen fundoplication was around 2014, then revision nissen fundoplication around 2020 and that failed and another nissen fundoplication revision around 2022- posterolateral thoracotomy on the left side for 2nd surgery .  SInce that surgery he has had pain and numbness in the left chest and also complains of dyspnea on exertion and inability to take a deep breath.  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        PSH  Reviewed, as below    Past Surgical History:   Procedure Laterality Date    BIOPSY BONE UPPER EXTREMITY Right 5/19/2017    Procedure: BIOPSY BONE UPPER EXTREMITY;  Biopsy, Curettage and Internal Fixation Right Humerus, and Supplemental Fixation with Zometa-Impregnated Cement;  Surgeon: John Hudson MD;  Location: [facility] OR    HERNIA REPAIR  2014    Hiatal hernia    NECK SURGERY  2011    Benign mass    NISSEN FUNDOPLICATION  2014    OPEN REDUCTION INTERNAL FIXATION HUMERUS DISTAL Right 5/19/2017    Procedure: OPEN REDUCTION INTERNAL FIXATION HUMERUS DISTAL;;  Surgeon: John Hudson MD;  Location: [facility] OR        ETOH denies   TOBdenies     Physical examination  BMI 22      Code Status: Full Code    Health  Care Proxy:    IMPRESSION No diagnosis found.   This person is a 48 year old male with an elevated left hemidiaphrgam     PLAN  I spent 45 min on the date of the encounter in chart review, patient visit, review of tests, documentation and/or discussion with other providers about the issues documented above. I reviewed the plan as follows:  We discussed that the thoracotomy pain was not specifically related to a rib displacement as he had been told. However, he could be seen in a pain clinic to address the post thoracotomy pain.  He does have an elevated left hemidiaphrgam that seems to be symptomatic. We discussed possible plication    Sniff test   Sitting supine PFTs  Review with results to see if he wants to proceed with surgery: he is hesitant about another major surgery given his surgical history    I appreciate the opportunity to participate in the care of your patient and will keep you updated.      Sincerely,    Deja York MD

## 2024-03-21 NOTE — PROGRESS NOTES
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     HPI  Vince Krueger is a 48 year old male with a histpry of multiple surgeries : nissen fundoplication was around 2014, then revision nissen fundoplication around 2020 and that failed and another nissen fundoplication revision around 2022- posterolateral thoracotomy on the left side for 2nd surgery .  SInce that surgery he has had pain and numbness in the left chest and also complains of dyspnea on exertion and inability to take a deep breath.  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        PSH  Reviewed, as below    Past Surgical History:   Procedure Laterality Date    BIOPSY BONE UPPER EXTREMITY Right 5/19/2017    Procedure: BIOPSY BONE UPPER EXTREMITY;  Biopsy, Curettage and Internal Fixation Right Humerus, and Supplemental Fixation with Zometa-Impregnated Cement;  Surgeon: John Hudson MD;  Location: [facility]    HERNIA REPAIR  2014    Hiatal hernia    NECK SURGERY  2011    Benign mass    NISSEN FUNDOPLICATION  2014    OPEN REDUCTION INTERNAL FIXATION HUMERUS DISTAL Right 5/19/2017    Procedure: OPEN REDUCTION INTERNAL FIXATION HUMERUS DISTAL;;  Surgeon: John Hudson MD;  Location: [facility]        ETOH denies   TOBdenies     Physical examination  BMI 22      Code Status: Full Code    Health Care Proxy:    IMPRESSION No diagnosis found.   This person is a 48 year old male with an elevated left hemidiaphrgam     PLAN  I spent 45 min on the date of the encounter in chart review, patient visit, review of tests, documentation and/or discussion with  other providers about the issues documented above. I reviewed the plan as follows:  We discussed that the thoracotomy pain was not specifically related to a rib displacement as he had been told. However, he could be seen in a pain clinic to address the post thoracotomy pain.  He does have an elevated left hemidiaphrgam that seems to be symptomatic. We discussed possible plication    Sniff test   Sitting supine PFTs  Review with results to see if he wants to proceed with surgery: he is hesitant about another major surgery given his surgical history    I appreciate the opportunity to participate in the care of your patient and will keep you updated.      Sincerely,    Deja York MD

## 2024-03-21 NOTE — NURSING NOTE
"Oncology Rooming Note    March 21, 2024 9:58 AM   Vince Krueger is a 48 year old male who presents for:    Chief Complaint   Patient presents with    Oncology Clinic Visit     Rib pain     Initial Vitals: /80   Pulse 80   Temp 98.1  F (36.7  C) (Oral)   Resp 16   Ht 1.83 m (6' 0.05\")   Wt 73.9 kg (163 lb)   SpO2 97%   BMI 22.08 kg/m   Estimated body mass index is 22.08 kg/m  as calculated from the following:    Height as of this encounter: 1.83 m (6' 0.05\").    Weight as of this encounter: 73.9 kg (163 lb). Body surface area is 1.94 meters squared.  Mild Pain (3) Comment: Data Unavailable   No LMP for male patient.  Allergies reviewed: Yes  Medications reviewed: Yes    Medications: Medication refills not needed today.  Pharmacy name entered into EPIC: [id number] - [address]    Frailty Screening:   Is the patient here for a new oncology consult visit in cancer care? 2. No      Clinical concerns: none       Ashli Chanel CMA            "

## 2024-04-03 ENCOUNTER — OFFICE VISIT (OUTPATIENT)
Dept: PULMONOLOGY | Facility: CLINIC | Age: 49
End: 2024-04-03
Attending: CLINICAL NURSE SPECIALIST
Payer: COMMERCIAL

## 2024-04-03 ENCOUNTER — ANCILLARY PROCEDURE (OUTPATIENT)
Dept: GENERAL RADIOLOGY | Facility: CLINIC | Age: 49
End: 2024-04-03
Attending: CLINICAL NURSE SPECIALIST
Payer: COMMERCIAL

## 2024-04-03 DIAGNOSIS — J98.6 DIAPHRAGM PARALYSIS: ICD-10-CM

## 2024-04-03 PROCEDURE — 76000 FLUOROSCOPY <1 HR PHYS/QHP: CPT | Mod: GC | Performed by: RADIOLOGY

## 2024-04-03 PROCEDURE — 94729 DIFFUSING CAPACITY: CPT | Performed by: INTERNAL MEDICINE

## 2024-04-03 PROCEDURE — 94060 EVALUATION OF WHEEZING: CPT | Performed by: INTERNAL MEDICINE

## 2024-04-03 PROCEDURE — 94726 PLETHYSMOGRAPHY LUNG VOLUMES: CPT | Performed by: INTERNAL MEDICINE

## 2024-04-03 NOTE — PROGRESS NOTES
Vince Krueger comes into clinic today at the request of Molly SPANN CNS Ordering Provider for PFT      Richard Ortiz , RRT

## 2024-04-04 ENCOUNTER — PATIENT OUTREACH (OUTPATIENT)
Dept: SURGERY | Facility: CLINIC | Age: 49
End: 2024-04-04

## 2024-04-04 DIAGNOSIS — J98.6 DIAPHRAGM PARALYSIS: Primary | ICD-10-CM

## 2024-04-04 NOTE — PROGRESS NOTES
Discussed results from sniff test and sitting/supine PFT's which confirmed left hemidiaphragm paralysis. Pt is still considering diaphragm plication surgery. We discussed that Dr. York will be updated on the results when she returns next week. Pt in agreement with plan for [facility] to call and check in 1-2 weeks regarding his decision. No further questions or concerns.    Heather Macias, RN BSN  Thoracic Surgery RN Care Coordinator  [phone]

## 2024-04-05 LAB
DLCOUNC-%PRED-PRE: 113 %
DLCOUNC-PRE: 35.19 ML/MIN/MMHG
DLCOUNC-PRED: 30.95 ML/MIN/MMHG
ERV-%PRED-PRE: 78 %
ERV-PRE: 1.4 L
ERV-PRED: 1.78 L
EXPTIME-PRE: 5.31 SEC
FEF2575-%PRED-POST: 72 %
FEF2575-%PRED-PRE: 109 %
FEF2575-POST: 2.54 L/SEC
FEF2575-PRE: 3.84 L/SEC
FEF2575-PRED: 3.52 L/SEC
FEFMAX-%PRED-PRE: 66 %
FEFMAX-PRE: 6.79 L/SEC
FEFMAX-PRED: 10.25 L/SEC
FEV1-%PRED-PRE: 85 %
FEV1-PRE: 3.31 L
FEV1FEV6-PRE: 86 %
FEV1FEV6-PRED: 80 %
FEV1FVC-PRE: 86 %
FEV1FVC-PRED: 79 %
FEV1SVC-PRE: 82 %
FEV1SVC-PRED: 78 %
FIFMAX-PRE: 6.57 L/SEC
FRCPLETH-%PRED-PRE: 99 %
FRCPLETH-PRE: 3.63 L
FRCPLETH-PRED: 3.65 L
FVC-%PRED-PRE: 78 %
FVC-PRE: 3.82 L
FVC-PRED: 4.9 L
IC-%PRED-PRE: 73 %
IC-PRE: 2.62 L
IC-PRED: 3.55 L
MEP-PRE: 106 CMH2O
MIP-PRE: -80 CMH2O
MVV-%PRED-PRE: 72 %
MVV-PRE: 116 L/MIN
MVV-PRED: 159 L/MIN
RVPLETH-%PRED-PRE: 97 %
RVPLETH-PRE: 2.23 L
RVPLETH-PRED: 2.28 L
TLCPLETH-%PRED-PRE: 82 %
TLCPLETH-PRE: 6.25 L
TLCPLETH-PRED: 7.53 L
VA-%PRED-PRE: 75 %
VA-PRE: 5.32 L
VC-%PRED-PRE: 80 %
VC-PRE: 4.01 L
VC-PRED: 4.96 L

## 2024-04-17 ENCOUNTER — MYC MEDICAL ADVICE (OUTPATIENT)
Dept: SURGERY | Facility: CLINIC | Age: 49
End: 2024-04-17
Payer: COMMERCIAL

## 2024-05-09 ENCOUNTER — PREP FOR PROCEDURE (OUTPATIENT)
Dept: SURGERY | Facility: CLINIC | Age: 49
End: 2024-05-09
Payer: COMMERCIAL

## 2024-05-09 DIAGNOSIS — J98.6 PARALYZED HEMIDIAPHRAGM: Primary | ICD-10-CM

## 2024-05-09 RX ORDER — CLINDAMYCIN PHOSPHATE 900 MG/50ML
900 INJECTION, SOLUTION INTRAVENOUS
Status: CANCELLED | OUTPATIENT
Start: 2024-05-09

## 2024-05-09 RX ORDER — CLINDAMYCIN PHOSPHATE 900 MG/50ML
900 INJECTION, SOLUTION INTRAVENOUS SEE ADMIN INSTRUCTIONS
Status: CANCELLED | OUTPATIENT
Start: 2024-05-09

## 2024-05-09 RX ORDER — ENOXAPARIN SODIUM 100 MG/ML
40 INJECTION SUBCUTANEOUS
Status: CANCELLED | OUTPATIENT
Start: 2024-05-09

## 2024-05-10 ENCOUNTER — HOSPITAL ENCOUNTER (OUTPATIENT)
Facility: CLINIC | Age: 49
End: 2024-05-10
Attending: STUDENT IN AN ORGANIZED HEALTH CARE EDUCATION/TRAINING PROGRAM | Admitting: STUDENT IN AN ORGANIZED HEALTH CARE EDUCATION/TRAINING PROGRAM
Payer: COMMERCIAL

## 2024-05-10 ENCOUNTER — TELEPHONE (OUTPATIENT)
Dept: SURGERY | Facility: CLINIC | Age: 49
End: 2024-05-10
Payer: COMMERCIAL

## 2024-05-10 NOTE — TELEPHONE ENCOUNTER
Called patient to discuss surgery scheduling with Dr. York. Spoke with patient and scheduled surgery with Dr. York for 6/28/24 at [facility].    H&P scheduled with PAC for 6/13/24. Patient is aware they will get their arrival time for surgery at PAC appointment.    Post-op with Molly Peck scheduled for 7/22/24 in [place]. Patient will need XR before appointment but no orders placed yet. Will message clinic for orders.    Writer will mail surgery packet via [employer].    The patient has no further questions regarding surgery at this time.     Anita Marin on 5/10/2024 at 3:35 PM

## 2024-05-13 DIAGNOSIS — J98.6 PARALYZED HEMIDIAPHRAGM: Primary | ICD-10-CM

## 2024-05-13 NOTE — TELEPHONE ENCOUNTER
FUTURE VISIT INFORMATION      SURGERY INFORMATION:  Date: 24  [facility]  Surgeon:  Deja York MD   Anesthesia Type:  general  Procedure: PLICATION, DIAPHRAGM, ROBOT-ASSISTED, laparoscopic, possible thoracoscopic   Consult: ov 3/21/24    RECORDS REQUESTED FROM:       Primary Care Provider: [facility]    Most recent EKG+ Tracin22- [facility]

## 2024-05-16 NOTE — TELEPHONE ENCOUNTER
Patient called in and asked to reschedule surgery to a later date. He does not have a date in mind and will call us when ready to move forward.    Patient would like another appointment with Dr. York and is okay with a virtual appointment. Will route to clinic to assist with appointment.    Anita Marin on 5/16/2024 at 4:11 PM

## 2024-06-11 ENCOUNTER — VIRTUAL VISIT (OUTPATIENT)
Dept: SURGERY | Facility: CLINIC | Age: 49
End: 2024-06-11
Attending: STUDENT IN AN ORGANIZED HEALTH CARE EDUCATION/TRAINING PROGRAM
Payer: COMMERCIAL

## 2024-06-11 DIAGNOSIS — J98.6 DIAPHRAGM PARALYSIS: Primary | ICD-10-CM

## 2024-06-11 PROCEDURE — 99215 OFFICE O/P EST HI 40 MIN: CPT | Mod: 95 | Performed by: STUDENT IN AN ORGANIZED HEALTH CARE EDUCATION/TRAINING PROGRAM

## 2024-06-11 NOTE — LETTER
6/11/2024      Vince Krueger  [address] [facility] [id number]      Dear Colleague,    Thank you for referring your patient, Vince Krueger, to the [facility]. Please see a copy of my visit note below.    Virtual Visit Details    Type of service:  Video Visit     Originating Location (pt. Location): Home    Distant Location (provider location):  On-site  Platform used for Video Visit: [facility]    THORACIC SURGERY - Follow up OFFICE VISIT             I saw Vince Krueger  in consultation for the evaluation and treatment of a paralyzed left hemidiaphragm       HPI  Vince Krueger is a 48 year old male with a histpry of multiple surgeries : nissen fundoplication was around 2014, then revision nissen fundoplication around 2020 and that failed and another nissen fundoplication revision around 2022- posterolateral thoracotomy on the left side for 2nd surgery .    He was seen previously for an elevated left hemidiaphragm and is here today with more questions and conserns    Previsit Tests   CT chest:  Elevated left hemidiaphragm    Sniff test: ? 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            PSH  Reviewed, as below     Past Surgical History         Past Surgical History:   Procedure Laterality Date    BIOPSY BONE UPPER EXTREMITY Right 5/19/2017     Procedure: BIOPSY BONE UPPER EXTREMITY;  Biopsy, Curettage and Internal Fixation Right Humerus, and Supplemental Fixation with Zometa-Impregnated Cement;  Surgeon: John Hudson MD;  [facility]    HERNIA REPAIR   2014     Hiatal hernia    NECK SURGERY   2011     Benign mass    NISSEN  FUNDOPLICATION   2014    OPEN REDUCTION INTERNAL FIXATION HUMERUS DISTAL Right 5/19/2017     Procedure: OPEN REDUCTION INTERNAL FIXATION HUMERUS DISTAL;;  Surgeon: John Hudson MD;  [facility]            ETOH denies   TOBdenies      Physical examination  BMI 22        Code Status: Full Code     Health Care Proxy:     IMPRESSION No diagnosis found.   This person is a 48 year old male with an elevated left hemidiaphrgam      PLAN  I spent 45 min on the date of the encounter in chart review, patient visit, review of tests, documentation and/or discussion with other providers about the issues documented above. I reviewed the plan as follows:  We discussed again that the only indication to do the plication would be dyspnea  He mentioned his GI symtpoms and other symtpoms of left chest pain  and parasthesias which I assured him are not from the diaphrgam  He is also concerned that a plication may affect the hiatal hernia . I explained to him that there is no data suggesting this but anecdotally, it has been a consideration.  I encouraged him to make the decision based on his dyspnea symptoms and how it is affecting his quality of life and if it wasn't affecting it much, then to just wait and watch        Deja York MD

## 2024-06-11 NOTE — PROGRESS NOTES
Virtual Visit Details    Type of service:  Video Visit     Originating Location (pt. Location): Home    Distant Location (provider location):  On-site  Platform used for Video Visit: [facility]    THORACIC SURGERY - Follow up OFFICE VISIT             I saw Vince Krueger  in consultation for the evaluation and treatment of a paralyzed left hemidiaphragm       HPI  Vince Krueger is a 48 year old male with a histpry of multiple surgeries : nissen fundoplication was around 2014, then revision nissen fundoplication around 2020 and that failed and another nissen fundoplication revision around 2022- posterolateral thoracotomy on the left side for 2nd surgery .    He was seen previously for an elevated left hemidiaphragm and is here today with more questions and conserns    Previsit Tests   CT chest:  Elevated left hemidiaphragm    Sniff test: ? 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            PSH  Reviewed, as below     Past Surgical History         Past Surgical History:   Procedure Laterality Date    BIOPSY BONE UPPER EXTREMITY Right 5/19/2017     Procedure: BIOPSY BONE UPPER EXTREMITY;  Biopsy, Curettage and Internal Fixation Right Humerus, and Supplemental Fixation with Zometa-Impregnated Cement;  Surgeon: John Hudson MD;  [facility]    HERNIA REPAIR   2014     Hiatal hernia    NECK SURGERY   2011     Benign mass    NISSEN FUNDOPLICATION   2014    OPEN REDUCTION INTERNAL FIXATION HUMERUS DISTAL Right 5/19/2017     Procedure: OPEN REDUCTION INTERNAL FIXATION HUMERUS DISTAL;;  Surgeon: John Hudson MD;  [facility]            ETOH denies   TOBdenies       Physical examination  BMI 22        Code Status: Full Code     Health Care Proxy:     IMPRESSION No diagnosis found.   This person is a 48 year old male with an elevated left hemidiaphrgam      PLAN  I spent 45 min on the date of the encounter in chart review, patient visit, review of tests, documentation and/or discussion with other providers about the issues documented above. I reviewed the plan as follows:  We discussed again that the only indication to do the plication would be dyspnea  He mentioned his GI symtpoms and other symtpoms of left chest pain  and parasthesias which I assured him are not from the diaphrgam  He is also concerned that a plication may affect the hiatal hernia . I explained to him that there is no data suggesting this but anecdotally, it has been a consideration.  I encouraged him to make the decision based on his dyspnea symptoms and how it is affecting his quality of life and if it wasn't affecting it much, then to just wait and watch

## 2024-06-11 NOTE — NURSING NOTE
Is the patient currently in the state of [place]? YES    Visit mode:VIDEO    If the visit is dropped, the patient can be reconnected by: VIDEO VISIT: Send to e-mail at: [email or web address]    Will anyone else be joining the visit? NO  (If patient encounters technical issues they should call [phone])    How would you like to obtain your AVS? MyChart    Are changes needed to the allergy or medication list? Pt stated no changes to allergies and Pt stated no med changes    Are refills needed on medications prescribed by this physician? NO    Reason for visit: RECHECK    Wm GOMEZ

## 2024-06-13 ENCOUNTER — PRE VISIT (OUTPATIENT)
Dept: SURGERY | Facility: CLINIC | Age: 49
End: 2024-06-13

## 2024-06-19 ENCOUNTER — LAB (OUTPATIENT)
Dept: LAB | Facility: CLINIC | Age: 49
End: 2024-06-19
Payer: COMMERCIAL

## 2024-06-19 DIAGNOSIS — C90.31 SOLITARY PLASMACYTOMA IN REMISSION (H): ICD-10-CM

## 2024-06-19 LAB
ALBUMIN SERPL BCG-MCNC: 4.5 G/DL (ref 3.5–5.2)
ALP SERPL-CCNC: 71 U/L (ref 40–150)
ALT SERPL W P-5'-P-CCNC: 8 U/L (ref 0–70)
ANION GAP SERPL CALCULATED.3IONS-SCNC: 9 MMOL/L (ref 7–15)
AST SERPL W P-5'-P-CCNC: 22 U/L (ref 0–45)
BASOPHILS # BLD AUTO: 0 10E3/UL (ref 0–0.2)
BASOPHILS NFR BLD AUTO: 0 %
BILIRUB SERPL-MCNC: 0.4 MG/DL
BUN SERPL-MCNC: 13.2 MG/DL (ref 6–20)
CALCIUM SERPL-MCNC: 9.5 MG/DL (ref 8.6–10)
CHLORIDE SERPL-SCNC: 104 MMOL/L (ref 98–107)
CREAT SERPL-MCNC: 0.99 MG/DL (ref 0.67–1.17)
DEPRECATED HCO3 PLAS-SCNC: 28 MMOL/L (ref 22–29)
EGFRCR SERPLBLD CKD-EPI 2021: >90 ML/MIN/1.73M2
EOSINOPHIL # BLD AUTO: 0 10E3/UL (ref 0–0.7)
EOSINOPHIL NFR BLD AUTO: 1 %
ERYTHROCYTE [DISTWIDTH] IN BLOOD BY AUTOMATED COUNT: 13.2 % (ref 10–15)
GLUCOSE SERPL-MCNC: 149 MG/DL (ref 70–99)
HCT VFR BLD AUTO: 47 % (ref 40–53)
HGB BLD-MCNC: 15.3 G/DL (ref 13.3–17.7)
IMM GRANULOCYTES # BLD: 0 10E3/UL
IMM GRANULOCYTES NFR BLD: 0 %
LYMPHOCYTES # BLD AUTO: 1.3 10E3/UL (ref 0.8–5.3)
LYMPHOCYTES NFR BLD AUTO: 28 %
MCH RBC QN AUTO: 29.4 PG (ref 26.5–33)
MCHC RBC AUTO-ENTMCNC: 32.6 G/DL (ref 31.5–36.5)
MCV RBC AUTO: 90 FL (ref 78–100)
MONOCYTES # BLD AUTO: 0.4 10E3/UL (ref 0–1.3)
MONOCYTES NFR BLD AUTO: 8 %
NEUTROPHILS # BLD AUTO: 3 10E3/UL (ref 1.6–8.3)
NEUTROPHILS NFR BLD AUTO: 63 %
NRBC # BLD AUTO: 0 10E3/UL
NRBC BLD AUTO-RTO: 0 /100
PLATELET # BLD AUTO: 235 10E3/UL (ref 150–450)
POTASSIUM SERPL-SCNC: 4.6 MMOL/L (ref 3.4–5.3)
PROT SERPL-MCNC: 7.5 G/DL (ref 6.4–8.3)
RBC # BLD AUTO: 5.2 10E6/UL (ref 4.4–5.9)
SODIUM SERPL-SCNC: 141 MMOL/L (ref 135–145)
TOTAL PROTEIN SERUM FOR ELP: 7 G/DL (ref 6.4–8.3)
WBC # BLD AUTO: 4.7 10E3/UL (ref 4–11)

## 2024-06-19 PROCEDURE — 84155 ASSAY OF PROTEIN SERUM: CPT | Mod: XU

## 2024-06-19 PROCEDURE — 84165 PROTEIN E-PHORESIS SERUM: CPT | Performed by: PATHOLOGY

## 2024-06-19 PROCEDURE — 36415 COLL VENOUS BLD VENIPUNCTURE: CPT

## 2024-06-19 PROCEDURE — 80053 COMPREHEN METABOLIC PANEL: CPT

## 2024-06-19 PROCEDURE — 85025 COMPLETE CBC W/AUTO DIFF WBC: CPT

## 2024-06-19 PROCEDURE — 83521 IG LIGHT CHAINS FREE EACH: CPT

## 2024-06-20 LAB
ALBUMIN SERPL ELPH-MCNC: 4.4 G/DL (ref 3.7–5.1)
ALPHA1 GLOB SERPL ELPH-MCNC: 0.2 G/DL (ref 0.2–0.4)
ALPHA2 GLOB SERPL ELPH-MCNC: 0.6 G/DL (ref 0.5–0.9)
B-GLOBULIN SERPL ELPH-MCNC: 0.8 G/DL (ref 0.6–1)
GAMMA GLOB SERPL ELPH-MCNC: 1 G/DL (ref 0.7–1.6)
KAPPA LC FREE SER-MCNC: 1.61 MG/DL (ref 0.33–1.94)
KAPPA LC FREE/LAMBDA FREE SER NEPH: 1.59 {RATIO} (ref 0.26–1.65)
LAMBDA LC FREE SERPL-MCNC: 1.01 MG/DL (ref 0.57–2.63)
M PROTEIN SERPL ELPH-MCNC: 0 G/DL
PATH REPORT.COMMENTS IMP SPEC: NORMAL
PROT PATTERN SERPL ELPH-IMP: NORMAL

## 2024-11-26 ENCOUNTER — LAB (OUTPATIENT)
Dept: LAB | Facility: CLINIC | Age: 49
End: 2024-11-26
Payer: COMMERCIAL

## 2024-11-26 DIAGNOSIS — C90.31 SOLITARY PLASMACYTOMA IN REMISSION (H): ICD-10-CM

## 2024-11-26 LAB
ALBUMIN SERPL BCG-MCNC: 4.8 G/DL (ref 3.5–5.2)
ALP SERPL-CCNC: 72 U/L (ref 40–150)
ALT SERPL W P-5'-P-CCNC: 15 U/L (ref 0–70)
ANION GAP SERPL CALCULATED.3IONS-SCNC: 12 MMOL/L (ref 7–15)
AST SERPL W P-5'-P-CCNC: 21 U/L (ref 0–45)
BASOPHILS # BLD AUTO: 0 10E3/UL (ref 0–0.2)
BASOPHILS NFR BLD AUTO: 1 %
BILIRUB SERPL-MCNC: 0.4 MG/DL
BUN SERPL-MCNC: 13.7 MG/DL (ref 6–20)
CALCIUM SERPL-MCNC: 9.8 MG/DL (ref 8.8–10.4)
CHLORIDE SERPL-SCNC: 100 MMOL/L (ref 98–107)
CREAT SERPL-MCNC: 0.97 MG/DL (ref 0.67–1.17)
EGFRCR SERPLBLD CKD-EPI 2021: >90 ML/MIN/1.73M2
EOSINOPHIL # BLD AUTO: 0 10E3/UL (ref 0–0.7)
EOSINOPHIL NFR BLD AUTO: 1 %
ERYTHROCYTE [DISTWIDTH] IN BLOOD BY AUTOMATED COUNT: 13.3 % (ref 10–15)
GLUCOSE SERPL-MCNC: 104 MG/DL (ref 70–99)
HCO3 SERPL-SCNC: 28 MMOL/L (ref 22–29)
HCT VFR BLD AUTO: 48 % (ref 40–53)
HGB BLD-MCNC: 15.4 G/DL (ref 13.3–17.7)
IMM GRANULOCYTES # BLD: 0 10E3/UL
IMM GRANULOCYTES NFR BLD: 0 %
LYMPHOCYTES # BLD AUTO: 1.6 10E3/UL (ref 0.8–5.3)
LYMPHOCYTES NFR BLD AUTO: 31 %
MCH RBC QN AUTO: 29.1 PG (ref 26.5–33)
MCHC RBC AUTO-ENTMCNC: 32.1 G/DL (ref 31.5–36.5)
MCV RBC AUTO: 91 FL (ref 78–100)
MONOCYTES # BLD AUTO: 0.5 10E3/UL (ref 0–1.3)
MONOCYTES NFR BLD AUTO: 10 %
NEUTROPHILS # BLD AUTO: 3 10E3/UL (ref 1.6–8.3)
NEUTROPHILS NFR BLD AUTO: 58 %
NRBC # BLD AUTO: 0 10E3/UL
NRBC BLD AUTO-RTO: 0 /100
PLATELET # BLD AUTO: 248 10E3/UL (ref 150–450)
POTASSIUM SERPL-SCNC: 3.9 MMOL/L (ref 3.4–5.3)
PROT SERPL-MCNC: 7.6 G/DL (ref 6.4–8.3)
RBC # BLD AUTO: 5.3 10E6/UL (ref 4.4–5.9)
SODIUM SERPL-SCNC: 140 MMOL/L (ref 135–145)
TOTAL PROTEIN SERUM FOR ELP: 7.2 G/DL (ref 6.4–8.3)
WBC # BLD AUTO: 5.2 10E3/UL (ref 4–11)

## 2024-11-27 LAB
ALBUMIN SERPL ELPH-MCNC: 4.6 G/DL (ref 3.7–5.1)
ALPHA1 GLOB SERPL ELPH-MCNC: 0.2 G/DL (ref 0.2–0.4)
ALPHA2 GLOB SERPL ELPH-MCNC: 0.6 G/DL (ref 0.5–0.9)
B-GLOBULIN SERPL ELPH-MCNC: 0.8 G/DL (ref 0.6–1)
GAMMA GLOB SERPL ELPH-MCNC: 1 G/DL (ref 0.7–1.6)
KAPPA LC FREE SER-MCNC: 1.63 MG/DL (ref 0.33–1.94)
KAPPA LC FREE/LAMBDA FREE SER NEPH: 1.85 {RATIO} (ref 0.26–1.65)
LAMBDA LC FREE SERPL-MCNC: 0.88 MG/DL (ref 0.57–2.63)
LOCATION OF TASK: NORMAL
M PROTEIN SERPL ELPH-MCNC: 0 G/DL
PROT PATTERN SERPL ELPH-IMP: NORMAL

## 2024-11-30 ENCOUNTER — HEALTH MAINTENANCE LETTER (OUTPATIENT)
Age: 49
End: 2024-11-30

## 2025-04-22 ENCOUNTER — PATIENT OUTREACH (OUTPATIENT)
Dept: ONCOLOGY | Facility: CLINIC | Age: 50
End: 2025-04-22
Payer: COMMERCIAL

## 2025-04-22 DIAGNOSIS — C90.30 PLASMACYTOMA OF BONE (H): Primary | ICD-10-CM

## 2025-04-22 NOTE — TELEPHONE ENCOUNTER
[facility]: Hematology                                                                                        Chart review following [employer] message from patient.  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  If results are stable, no need for office visit at this time.  Will continue with labs every 6 months and office visits at least every 3 years     Lab orders entered, message sent to patient     Nel aHmm LPN  Hematology Care Coordination  [phone]

## 2025-06-05 ENCOUNTER — LAB (OUTPATIENT)
Dept: LAB | Facility: CLINIC | Age: 50
End: 2025-06-05
Payer: COMMERCIAL

## 2025-06-05 DIAGNOSIS — C90.30 PLASMACYTOMA OF BONE (H): ICD-10-CM

## 2025-06-05 LAB
ALBUMIN SERPL BCG-MCNC: 4.7 G/DL (ref 3.5–5.2)
ALP SERPL-CCNC: 74 U/L (ref 40–150)
ALT SERPL W P-5'-P-CCNC: 16 U/L (ref 0–70)
ANION GAP SERPL CALCULATED.3IONS-SCNC: 11 MMOL/L (ref 7–15)
AST SERPL W P-5'-P-CCNC: 25 U/L (ref 0–45)
BASOPHILS # BLD AUTO: 0 10E3/UL (ref 0–0.2)
BASOPHILS NFR BLD AUTO: 1 %
BILIRUB SERPL-MCNC: 0.6 MG/DL
BUN SERPL-MCNC: 12.8 MG/DL (ref 6–20)
CALCIUM SERPL-MCNC: 10.1 MG/DL (ref 8.8–10.4)
CHLORIDE SERPL-SCNC: 102 MMOL/L (ref 98–107)
CREAT SERPL-MCNC: 1.06 MG/DL (ref 0.67–1.17)
EGFRCR SERPLBLD CKD-EPI 2021: 85 ML/MIN/1.73M2
EOSINOPHIL # BLD AUTO: 0.1 10E3/UL (ref 0–0.7)
EOSINOPHIL NFR BLD AUTO: 1 %
ERYTHROCYTE [DISTWIDTH] IN BLOOD BY AUTOMATED COUNT: 13.5 % (ref 10–15)
GLUCOSE SERPL-MCNC: 111 MG/DL (ref 70–99)
HCO3 SERPL-SCNC: 26 MMOL/L (ref 22–29)
HCT VFR BLD AUTO: 47.3 % (ref 40–53)
HGB BLD-MCNC: 15.6 G/DL (ref 13.3–17.7)
IMM GRANULOCYTES # BLD: 0 10E3/UL
IMM GRANULOCYTES NFR BLD: 0 %
LYMPHOCYTES # BLD AUTO: 1.4 10E3/UL (ref 0.8–5.3)
LYMPHOCYTES NFR BLD AUTO: 29 %
MCH RBC QN AUTO: 29.3 PG (ref 26.5–33)
MCHC RBC AUTO-ENTMCNC: 33 G/DL (ref 31.5–36.5)
MCV RBC AUTO: 89 FL (ref 78–100)
MONOCYTES # BLD AUTO: 0.4 10E3/UL (ref 0–1.3)
MONOCYTES NFR BLD AUTO: 9 %
NEUTROPHILS # BLD AUTO: 3 10E3/UL (ref 1.6–8.3)
NEUTROPHILS NFR BLD AUTO: 61 %
NRBC # BLD AUTO: 0 10E3/UL
NRBC BLD AUTO-RTO: 0 /100
PLATELET # BLD AUTO: 253 10E3/UL (ref 150–450)
POTASSIUM SERPL-SCNC: 4.5 MMOL/L (ref 3.4–5.3)
PROT SERPL-MCNC: 7.7 G/DL (ref 6.4–8.3)
RBC # BLD AUTO: 5.33 10E6/UL (ref 4.4–5.9)
SODIUM SERPL-SCNC: 139 MMOL/L (ref 135–145)
TOTAL PROTEIN SERUM FOR ELP: 7.5 G/DL (ref 6.4–8.3)
WBC # BLD AUTO: 4.9 10E3/UL (ref 4–11)

## (undated) DEVICE — LINEN BACK PACK 5440

## (undated) DEVICE — MITT SURGICAL PREP HAIR REMOVER LATEX FREE SPM100

## (undated) DEVICE — SU PDS II 3-0 SH 27" Z316H

## (undated) DEVICE — GLOVE PROTEXIS BLUE W/NEU-THERA 8.5  2D73EB85

## (undated) DEVICE — SYR 03ML SLIP TIP W/O NDL LATEX FREE 309656

## (undated) DEVICE — SU PDS II 3-0 PS-1 18" Z683G

## (undated) DEVICE — SU VICRYL 2-0 CT-1 27" UND J259H

## (undated) DEVICE — GOWN IMPERVIOUS SPECIALTY XLG/XLONG 32474

## (undated) DEVICE — DRAPE C-ARM W/STRAPS 42X72" 07-CA104

## (undated) DEVICE — SU SILK 3-0 TIE 12X30" A304H

## (undated) DEVICE — PREP DURAPREP 26ML APL 8630

## (undated) DEVICE — IMPLANTABLE DEVICE
Type: IMPLANTABLE DEVICE | Site: HUMERUS | Status: NON-FUNCTIONAL
Removed: 2017-05-19

## (undated) DEVICE — LINEN TOWEL PACK X5 5464

## (undated) DEVICE — BONE CEMENT NOZZLE THIN FLEX 206-515-000

## (undated) DEVICE — SPECIMEN CONTAINER 5OZ STERILE 2600SA

## (undated) DEVICE — DRAPE MAYO STAND 23X54 8337

## (undated) DEVICE — STIMULATOR NERVE VARI-STIM III 8562010

## (undated) DEVICE — SUCTION TIP YANKAUER W/O VENT K86

## (undated) DEVICE — SU PDS II 2-0 SH 27" Z317H

## (undated) DEVICE — PACK SET-UP STD 9102

## (undated) DEVICE — CLIP HORIZON SM RED WIDE SLOT 001201

## (undated) DEVICE — SLING ARM XLG 79-99158

## (undated) DEVICE — SYR BULB IRRIG 50ML LATEX FREE 0035280

## (undated) DEVICE — DRSG STERI STRIP 1/2X4" R1547

## (undated) DEVICE — LINEN GOWN X4 5410

## (undated) DEVICE — GLOVE PROTEXIS BLUE W/NEU-THERA 7.5  2D73EB75

## (undated) DEVICE — SUCTION MANIFOLD DORNOCH ULTRA CART UL-CL500

## (undated) DEVICE — PREP SKIN SCRUB TRAY 4461A

## (undated) DEVICE — SU SILK 2-0 TIE 12X30" A305H

## (undated) DEVICE — SPONGE LAP 18X18" X8435

## (undated) DEVICE — ESU GROUND PAD UNIVERSAL W/O CORD

## (undated) DEVICE — SU VICRYL 2-0 SH 27" UND J417H

## (undated) DEVICE — CATH TRAY FOLEY SURESTEP 16FR WDRAIN BAG STLK LATEX A300316A

## (undated) DEVICE — LINEN DRAPE 54X72" 5467

## (undated) DEVICE — CLIP HORIZON MED BLUE 002200

## (undated) DEVICE — ESU PENCIL W/HOLSTER

## (undated) DEVICE — LINEN GOWN OVERSIZE 5408

## (undated) DEVICE — SOL NACL 0.9% IRRIG 1000ML BOTTLE 2F7124

## (undated) DEVICE — WIPES FOLEY CARE SURESTEP PROVON DFC100

## (undated) DEVICE — DRILL BIT SYN 4.3X180MM  310.431

## (undated) DEVICE — Device

## (undated) DEVICE — SU VICRYL 0 CT-2 27" J334H

## (undated) DEVICE — CAST PADDING 4" STERILE 9044S

## (undated) DEVICE — BONE CEMENT MIXEVAC HI VAC W/CARTRIDGE 0306-563-000

## (undated) DEVICE — GLOVE PROTEXIS W/NEU-THERA 8.5  2D73TE85

## (undated) DEVICE — GLOVE PROTEXIS W/NEU-THERA 7.0  2D73TE70

## (undated) DEVICE — BONE WAX 2.5GM W31G

## (undated) DEVICE — DRILL BIT 3.2X145MM  310.31

## (undated) RX ORDER — GLYCOPYRROLATE 0.2 MG/ML
INJECTION, SOLUTION INTRAMUSCULAR; INTRAVENOUS
Status: DISPENSED
Start: 2017-05-19

## (undated) RX ORDER — HYDROMORPHONE HYDROCHLORIDE 1 MG/ML
INJECTION, SOLUTION INTRAMUSCULAR; INTRAVENOUS; SUBCUTANEOUS
Status: DISPENSED
Start: 2017-05-19

## (undated) RX ORDER — DEXAMETHASONE SODIUM PHOSPHATE 4 MG/ML
INJECTION, SOLUTION INTRA-ARTICULAR; INTRALESIONAL; INTRAMUSCULAR; INTRAVENOUS; SOFT TISSUE
Status: DISPENSED
Start: 2017-05-19

## (undated) RX ORDER — KETAMINE HYDROCHLORIDE 10 MG/ML
INJECTION, SOLUTION INTRAMUSCULAR; INTRAVENOUS
Status: DISPENSED
Start: 2017-05-19

## (undated) RX ORDER — LIDOCAINE HYDROCHLORIDE 20 MG/ML
INJECTION, SOLUTION EPIDURAL; INFILTRATION; INTRACAUDAL; PERINEURAL
Status: DISPENSED
Start: 2017-05-19

## (undated) RX ORDER — ZOLEDRONIC ACID 4 MG/5ML
INJECTION INTRAVENOUS
Status: DISPENSED
Start: 2017-05-19

## (undated) RX ORDER — NEOSTIGMINE METHYLSULFATE 5 MG/5 ML
SYRINGE (ML) INTRAVENOUS
Status: DISPENSED
Start: 2017-05-19

## (undated) RX ORDER — ONDANSETRON 2 MG/ML
INJECTION INTRAMUSCULAR; INTRAVENOUS
Status: DISPENSED
Start: 2017-05-19

## (undated) RX ORDER — PROPOFOL 10 MG/ML
INJECTION, EMULSION INTRAVENOUS
Status: DISPENSED
Start: 2017-05-19

## (undated) RX ORDER — FENTANYL CITRATE 50 UG/ML
INJECTION, SOLUTION INTRAMUSCULAR; INTRAVENOUS
Status: DISPENSED
Start: 2017-05-19

## (undated) RX ORDER — OXYCODONE HYDROCHLORIDE 5 MG/1
TABLET ORAL
Status: DISPENSED
Start: 2017-05-19

## (undated) RX ORDER — GABAPENTIN 300 MG/1
CAPSULE ORAL
Status: DISPENSED
Start: 2017-05-19

## (undated) RX ORDER — ACETAMINOPHEN 325 MG/1
TABLET ORAL
Status: DISPENSED
Start: 2017-05-19